# Patient Record
Sex: MALE | Race: WHITE | NOT HISPANIC OR LATINO | Employment: OTHER | URBAN - NONMETROPOLITAN AREA
[De-identification: names, ages, dates, MRNs, and addresses within clinical notes are randomized per-mention and may not be internally consistent; named-entity substitution may affect disease eponyms.]

---

## 2018-02-17 ENCOUNTER — TRANSCRIBE ORDERS (OUTPATIENT)
Dept: ADMINISTRATIVE | Facility: HOSPITAL | Age: 53
End: 2018-02-17

## 2018-02-17 ENCOUNTER — HOSPITAL ENCOUNTER (OUTPATIENT)
Dept: GENERAL RADIOLOGY | Facility: HOSPITAL | Age: 53
Discharge: HOME | End: 2018-02-17
Admitting: NURSE PRACTITIONER

## 2018-02-17 DIAGNOSIS — M54.5 LOW BACK PAIN, UNSPECIFIED BACK PAIN LATERALITY, UNSPECIFIED CHRONICITY, WITH SCIATICA PRESENCE UNSPECIFIED: Primary | ICD-10-CM

## 2018-02-17 PROCEDURE — 72110 X-RAY EXAM L-2 SPINE 4/>VWS: CPT

## 2018-03-01 ENCOUNTER — OFFICE VISIT (OUTPATIENT)
Dept: PRIMARY CARE CLINIC | Age: 53
End: 2018-03-01
Payer: COMMERCIAL

## 2018-03-01 VITALS
OXYGEN SATURATION: 97 % | BODY MASS INDEX: 24.64 KG/M2 | DIASTOLIC BLOOD PRESSURE: 70 MMHG | TEMPERATURE: 97.6 F | HEART RATE: 100 BPM | WEIGHT: 157 LBS | SYSTOLIC BLOOD PRESSURE: 100 MMHG | HEIGHT: 67 IN | RESPIRATION RATE: 16 BRPM

## 2018-03-01 DIAGNOSIS — G89.29 CHRONIC BILATERAL LOW BACK PAIN WITH BILATERAL SCIATICA: ICD-10-CM

## 2018-03-01 DIAGNOSIS — R79.89 LOW TESTOSTERONE LEVEL IN FEMALE: Primary | ICD-10-CM

## 2018-03-01 DIAGNOSIS — Z78.9 TRANSGENDER: ICD-10-CM

## 2018-03-01 DIAGNOSIS — M54.41 CHRONIC BILATERAL LOW BACK PAIN WITH BILATERAL SCIATICA: ICD-10-CM

## 2018-03-01 DIAGNOSIS — M54.42 CHRONIC BILATERAL LOW BACK PAIN WITH BILATERAL SCIATICA: ICD-10-CM

## 2018-03-01 PROCEDURE — 99204 OFFICE O/P NEW MOD 45 MIN: CPT | Performed by: FAMILY MEDICINE

## 2018-03-01 RX ORDER — KETOROLAC TROMETHAMINE 10 MG/1
10 TABLET, FILM COATED ORAL EVERY 6 HOURS PRN
COMMUNITY
Start: 2018-02-17 | End: 2018-10-25

## 2018-03-01 RX ORDER — KETOROLAC TROMETHAMINE 10 MG/1
10 TABLET, FILM COATED ORAL EVERY 6 HOURS PRN
Qty: 20 TABLET | Refills: 0 | Status: SHIPPED | OUTPATIENT
Start: 2018-03-01 | End: 2018-10-25

## 2018-03-01 RX ORDER — PREDNISONE 10 MG/1
TABLET ORAL
Qty: 48 EACH | Refills: 0 | Status: SHIPPED | OUTPATIENT
Start: 2018-03-01 | End: 2018-10-25

## 2018-03-01 RX ORDER — TIZANIDINE HYDROCHLORIDE 4 MG/1
4 CAPSULE, GELATIN COATED ORAL 3 TIMES DAILY
Qty: 60 CAPSULE | Refills: 0 | Status: SHIPPED | OUTPATIENT
Start: 2018-03-01 | End: 2018-10-25

## 2018-03-02 ASSESSMENT — ENCOUNTER SYMPTOMS
RHINORRHEA: 0
BACK PAIN: 1
NAUSEA: 0
COLOR CHANGE: 0
DIARRHEA: 0
ABDOMINAL PAIN: 0
COUGH: 0
CONSTIPATION: 0
VOMITING: 0

## 2018-03-02 NOTE — PROGRESS NOTES
Comments)     Sun posioning    Lyrica [Pregabalin] Other (See Comments)     Sleeping to much      Robaxin [Methocarbamol] Other (See Comments)     nightmares     Past Surgical History:   Procedure Laterality Date    KNEE ARTHROSCOPY      MASTECTOMY, BILATERAL      SHOULDER ARTHROSCOPY      THROAT SURGERY      TONSILLECTOMY AND ADENOIDECTOMY       History reviewed. No pertinent family history. Social History     Social History    Marital status:      Spouse name: N/A    Number of children: N/A    Years of education: N/A     Occupational History    Not on file. Social History Main Topics    Smoking status: Current Every Day Smoker     Packs/day: 1.00    Smokeless tobacco: Never Used    Alcohol use No    Drug use: No    Sexual activity: No      Comment: Pt is a transgender     Other Topics Concern    Not on file     Social History Narrative    No narrative on file       Review of Systems   Constitutional: Positive for fatigue. Negative for activity change and appetite change. HENT: Negative for congestion and rhinorrhea. Eyes: Negative for visual disturbance. Respiratory: Negative for cough. Cardiovascular: Negative for chest pain and palpitations. Gastrointestinal: Negative for abdominal pain, constipation, diarrhea, nausea and vomiting. Genitourinary: Negative for decreased urine volume and difficulty urinating. Musculoskeletal: Positive for arthralgias and back pain. Skin: Positive for rash (lesions on arms). Negative for color change. Allergic/Immunologic: Negative for immunocompromised state. Neurological: Negative for seizures and headaches. Hematological: Does not bruise/bleed easily. Psychiatric/Behavioral: Negative for agitation and sleep disturbance. OBJECTIVE:    Physical Exam   Constitutional: He is oriented to person, place, and time. He appears well-developed and well-nourished. No distress. HENT:   Head: Normocephalic and atraumatic.

## 2018-07-25 ENCOUNTER — TELEPHONE (OUTPATIENT)
Dept: PRIMARY CARE CLINIC | Age: 53
End: 2018-07-25

## 2018-07-25 NOTE — LETTER
[address]    Maria Guadalupe Arias, APRN    7/25/2018          [address]        Dear Mr. Hatfield:    In addition to helping you feel better when you are sick, we at [facility] ([facility]) are also interested in illness and injury prevention. To assist you in maintaining your good health, our records indicate that you are due for the following:      Health Maintenance Due   Topic    Lipid screen     Colon cancer screen colonoscopy           Please call to make an appointment with your primary care provider at your earliest convenience. If you have already received these services, please let us know so that your medical record can be updated. We look  forward to hearing from you soon.     Sincerely,         [address] Team

## 2018-10-25 ENCOUNTER — HOSPITAL ENCOUNTER (EMERGENCY)
Age: 53
Discharge: HOME | End: 2018-10-26
Payer: COMMERCIAL

## 2018-10-25 DIAGNOSIS — J40 BRONCHITIS: Primary | ICD-10-CM

## 2018-10-25 DIAGNOSIS — J44.1 COPD EXACERBATION (HCC): ICD-10-CM

## 2018-10-25 PROCEDURE — 93005 ELECTROCARDIOGRAM TRACING: CPT

## 2018-10-25 PROCEDURE — 99285 EMERGENCY DEPT VISIT HI MDM: CPT

## 2018-10-25 RX ORDER — IPRATROPIUM BROMIDE AND ALBUTEROL SULFATE 2.5; .5 MG/3ML; MG/3ML
1 SOLUTION RESPIRATORY (INHALATION) ONCE
Status: COMPLETED | OUTPATIENT
Start: 2018-10-25 | End: 2018-10-26

## 2018-10-25 ASSESSMENT — PAIN DESCRIPTION - LOCATION: LOCATION: CHEST

## 2018-10-25 ASSESSMENT — PAIN SCALES - GENERAL: PAINLEVEL_OUTOF10: 4

## 2018-10-25 ASSESSMENT — PAIN DESCRIPTION - DESCRIPTORS: DESCRIPTORS: PRESSURE

## 2018-10-26 ENCOUNTER — APPOINTMENT (OUTPATIENT)
Dept: GENERAL RADIOLOGY | Age: 53
End: 2018-10-26
Payer: COMMERCIAL

## 2018-10-26 VITALS
BODY MASS INDEX: 25.11 KG/M2 | OXYGEN SATURATION: 94 % | DIASTOLIC BLOOD PRESSURE: 69 MMHG | SYSTOLIC BLOOD PRESSURE: 106 MMHG | TEMPERATURE: 98 F | WEIGHT: 160 LBS | HEIGHT: 67 IN | HEART RATE: 94 BPM | RESPIRATION RATE: 18 BRPM

## 2018-10-26 LAB
ALBUMIN SERPL-MCNC: 4 G/DL (ref 3.5–5.2)
ALP BLD-CCNC: 91 U/L (ref 40–130)
ALT SERPL-CCNC: 15 U/L (ref 5–41)
ANION GAP SERPL CALCULATED.3IONS-SCNC: 11 MMOL/L (ref 7–19)
AST SERPL-CCNC: 14 U/L (ref 5–40)
BILIRUB SERPL-MCNC: <0.2 MG/DL (ref 0.2–1.2)
BUN BLDV-MCNC: 15 MG/DL (ref 6–20)
CALCIUM SERPL-MCNC: 9.1 MG/DL (ref 8.6–10)
CHLORIDE BLD-SCNC: 106 MMOL/L (ref 98–111)
CO2: 23 MMOL/L (ref 22–29)
CREAT SERPL-MCNC: 0.9 MG/DL (ref 0.5–1.2)
EKG P AXIS: 81 DEGREES
EKG P-R INTERVAL: 142 MS
EKG Q-T INTERVAL: 312 MS
EKG QRS DURATION: 84 MS
EKG QTC CALCULATION (BAZETT): 387 MS
EKG T AXIS: 69 DEGREES
GFR NON-AFRICAN AMERICAN: >60
GLUCOSE BLD-MCNC: 107 MG/DL (ref 74–109)
HCT VFR BLD CALC: 41.6 % (ref 42–52)
HEMOGLOBIN: 14 G/DL (ref 14–18)
MCH RBC QN AUTO: 30.9 PG (ref 27–31)
MCHC RBC AUTO-ENTMCNC: 33.7 G/DL (ref 33–37)
MCV RBC AUTO: 91.8 FL (ref 80–94)
PDW BLD-RTO: 11.9 % (ref 11.5–14.5)
PLATELET # BLD: 142 K/UL (ref 130–400)
PMV BLD AUTO: 10.6 FL (ref 9.4–12.4)
POTASSIUM SERPL-SCNC: 4.2 MMOL/L (ref 3.5–5)
RBC # BLD: 4.53 M/UL (ref 4.7–6.1)
SODIUM BLD-SCNC: 140 MMOL/L (ref 136–145)
TOTAL PROTEIN: 6.3 G/DL (ref 6.6–8.7)
WBC # BLD: 4.7 K/UL (ref 4.8–10.8)

## 2018-10-26 PROCEDURE — 71045 X-RAY EXAM CHEST 1 VIEW: CPT

## 2018-10-26 PROCEDURE — 96374 THER/PROPH/DIAG INJ IV PUSH: CPT

## 2018-10-26 PROCEDURE — 94664 DEMO&/EVAL PT USE INHALER: CPT

## 2018-10-26 PROCEDURE — 6360000002 HC RX W HCPCS: Performed by: NURSE PRACTITIONER

## 2018-10-26 PROCEDURE — 36415 COLL VENOUS BLD VENIPUNCTURE: CPT

## 2018-10-26 PROCEDURE — 85027 COMPLETE CBC AUTOMATED: CPT

## 2018-10-26 PROCEDURE — 80053 COMPREHEN METABOLIC PANEL: CPT

## 2018-10-26 PROCEDURE — 94640 AIRWAY INHALATION TREATMENT: CPT

## 2018-10-26 PROCEDURE — 99284 EMERGENCY DEPT VISIT MOD MDM: CPT | Performed by: NURSE PRACTITIONER

## 2018-10-26 PROCEDURE — 6370000000 HC RX 637 (ALT 250 FOR IP): Performed by: NURSE PRACTITIONER

## 2018-10-26 RX ORDER — METHYLPREDNISOLONE SODIUM SUCCINATE 125 MG/2ML
125 INJECTION, POWDER, LYOPHILIZED, FOR SOLUTION INTRAMUSCULAR; INTRAVENOUS ONCE
Status: COMPLETED | OUTPATIENT
Start: 2018-10-26 | End: 2018-10-26

## 2018-10-26 RX ORDER — PREDNISONE 20 MG/1
20 TABLET ORAL 2 TIMES DAILY
Qty: 10 TABLET | Refills: 0 | Status: SHIPPED | OUTPATIENT
Start: 2018-10-26 | End: 2018-10-31

## 2018-10-26 RX ORDER — ALBUTEROL SULFATE 90 UG/1
2 AEROSOL, METERED RESPIRATORY (INHALATION) EVERY 6 HOURS PRN
Qty: 1 INHALER | Refills: 3 | Status: SHIPPED | OUTPATIENT
Start: 2018-10-26

## 2018-10-26 RX ORDER — AZITHROMYCIN 250 MG/1
TABLET, FILM COATED ORAL
Qty: 1 PACKET | Refills: 0 | Status: SHIPPED | OUTPATIENT
Start: 2018-10-26 | End: 2018-10-30

## 2018-10-26 RX ADMIN — IPRATROPIUM BROMIDE AND ALBUTEROL SULFATE 1 AMPULE: .5; 3 SOLUTION RESPIRATORY (INHALATION) at 00:02

## 2018-10-26 RX ADMIN — METHYLPREDNISOLONE SODIUM SUCCINATE 125 MG: 125 INJECTION, POWDER, FOR SOLUTION INTRAMUSCULAR; INTRAVENOUS at 00:56

## 2018-10-26 ASSESSMENT — ENCOUNTER SYMPTOMS
COUGH: 1
SPUTUM PRODUCTION: 1
WHEEZING: 1
SHORTNESS OF BREATH: 1

## 2018-10-26 NOTE — ED PROVIDER NOTES
[carisoprodol]; Cortisporin [bacitra-neomycin-polymyxin-hc]; Flexeril [cyclobenzaprine]; Gabapentin; Tetracyclines & related; Lyrica [pregabalin]; and Robaxin [methocarbamol]    FAMILY HISTORY     History reviewed. No pertinent family history. SOCIAL HISTORY       Social History     Social History    Marital status:      Spouse name: N/A    Number of children: N/A    Years of education: N/A     Social History Main Topics    Smoking status: Current Every Day Smoker     Packs/day: 1.00    Smokeless tobacco: Never Used    Alcohol use No    Drug use: No    Sexual activity: No      Comment: Pt is a transgender     Other Topics Concern    None     Social History Narrative    None       SCREENINGS      @FLOW([id number])@      PHYSICAL EXAM    (up to 7 for level 4, 8 or more for level 5)     ED Triage Vitals [10/25/18 2349]   BP Temp Temp Source Pulse Resp SpO2 Height Weight   (!) 148/81 97.9 °F (36.6 °C) Oral 119 22 92 % 5' 7\" (1.702 m) 160 lb (72.6 kg)       Physical Exam   Constitutional: He appears well-developed and well-nourished. HENT:   Head: Normocephalic and atraumatic. Eyes: Right eye exhibits no discharge. Left eye exhibits no discharge. No scleral icterus. Neck: Normal range of motion. Neck supple. Cardiovascular: S1 normal, S2 normal and normal heart sounds. Tachycardia present. Pulmonary/Chest: Effort normal. No respiratory distress. He has wheezes. Neurological: He is alert. Psychiatric: He has a normal mood and affect. His behavior is normal.   Nursing note and vitals reviewed.       DIAGNOSTIC RESULTS     EKG: All EKG's are interpreted by the Emergency Department Physician who either signs or Co-signsthis chart in the absence of a cardiologist.        RADIOLOGY:   Non-plain filmimages such as CT, Ultrasound and MRI are read by the radiologist. Plain radiographic images are visualized and preliminarily interpreted by the emergency physician with the below

## 2020-07-20 ENCOUNTER — APPOINTMENT (OUTPATIENT)
Dept: CT IMAGING | Age: 55
End: 2020-07-20

## 2020-07-20 ENCOUNTER — APPOINTMENT (OUTPATIENT)
Dept: GENERAL RADIOLOGY | Age: 55
End: 2020-07-20

## 2020-07-20 ENCOUNTER — HOSPITAL ENCOUNTER (EMERGENCY)
Age: 55
Discharge: HOME | End: 2020-07-20
Attending: EMERGENCY MEDICINE

## 2020-07-20 VITALS
BODY MASS INDEX: 25.9 KG/M2 | OXYGEN SATURATION: 96 % | HEIGHT: 67 IN | WEIGHT: 165 LBS | TEMPERATURE: 97.8 F | DIASTOLIC BLOOD PRESSURE: 80 MMHG | SYSTOLIC BLOOD PRESSURE: 120 MMHG | HEART RATE: 79 BPM | RESPIRATION RATE: 18 BRPM

## 2020-07-20 PROCEDURE — 70450 CT HEAD/BRAIN W/O DYE: CPT

## 2020-07-20 PROCEDURE — 72125 CT NECK SPINE W/O DYE: CPT

## 2020-07-20 PROCEDURE — 72131 CT LUMBAR SPINE W/O DYE: CPT

## 2020-07-20 PROCEDURE — 73030 X-RAY EXAM OF SHOULDER: CPT

## 2020-07-20 PROCEDURE — 73080 X-RAY EXAM OF ELBOW: CPT

## 2020-07-20 PROCEDURE — 73110 X-RAY EXAM OF WRIST: CPT

## 2020-07-20 PROCEDURE — 73523 X-RAY EXAM HIPS BI 5/> VIEWS: CPT

## 2020-07-20 PROCEDURE — 99284 EMERGENCY DEPT VISIT MOD MDM: CPT

## 2020-07-20 PROCEDURE — 72128 CT CHEST SPINE W/O DYE: CPT

## 2020-07-20 ASSESSMENT — PAIN DESCRIPTION - LOCATION: LOCATION: BACK;WRIST;NECK

## 2020-07-20 ASSESSMENT — PAIN DESCRIPTION - PAIN TYPE: TYPE: ACUTE PAIN

## 2020-07-21 NOTE — ED PROVIDER NOTES
[facility] EMERGENCY DEPT  eMERGENCY dEPARTMENT eNCOUnter      Pt Name: Marilu Ellington  MRN: [id number]  [place] 1965  Date of evaluation: 7/20/2020  Provider: Fili Escoto MD    CHIEF COMPLAINT       Chief Complaint   Patient presents with    Fall     slip and fall @ work; c/o pain in neck, hips, lower back, bilat wrists; denies LOC         HISTORY OF PRESENT ILLNESS   (Location/Symptom, Timing/Onset,Context/Setting, Quality, Duration, Modifying Factors, Severity)  Note limiting factors. Marilu Ellington is a 63 y.o. male who presents to the emergency department for evaluation of injury sustained from a fall. Patient reports that he was coming out of work he slipped on the floor that was wet. States he fell backwards landing on his right shoulder and right wrist area. Also complains of pain in his low back area. He describes the pain is sharp in nature he is able to ambulate prior to ED arrival  He stated he did not strike his head or sustain loss of consciousness. The symptoms are described as moderate in severity. HPI    NursingNotes were reviewed. REVIEW OF SYSTEMS    (2-9 systems for level 4, 10 or more for level 5)     Review of Systems   Constitutional: Negative for chills and fever. Respiratory: Negative for shortness of breath. Cardiovascular: Negative for chest pain. Gastrointestinal: Negative for abdominal pain and vomiting. Musculoskeletal: Positive for back pain. Negative for gait problem. Neurological: Negative for dizziness and numbness. All other systems reviewed and are negative.            PAST MEDICALHISTORY     Past Medical History:   Diagnosis Date    Fibromyalgia     Hiatal hernia     Low testosterone     Tourette's          SURGICAL HISTORY       Past Surgical History:   Procedure Laterality Date    KNEE ARTHROSCOPY      MASTECTOMY, BILATERAL      SHOULDER ARTHROSCOPY      THROAT SURGERY      TONSILLECTOMY AND ADENOIDECTOMY           CURRENT MEDICATIONS Discharge Medication List as of 7/20/2020 10:46 PM      CONTINUE these medications which have NOT CHANGED    Details   albuterol sulfate HFA (VENTOLIN HFA) 108 (90 Base) MCG/ACT inhaler Inhale 2 puffs into the lungs every 6 hours as needed for Wheezing, Disp-1 Inhaler, R-3Print             ALLERGIES     Soma [carisoprodol]; Cortisporin [bacitra-neomycin-polymyxin-hc]; Flexeril [cyclobenzaprine]; Gabapentin; Tetracyclines & related; Lyrica [pregabalin]; and Robaxin [methocarbamol]    FAMILY HISTORY     No family history on file.        SOCIAL HISTORY       Social History     Socioeconomic History    Marital status:      Spouse name: Not on file    Number of children: Not on file    Years of education: Not on file    Highest education level: Not on file   Occupational History    Not on file   Social Needs    Financial resource strain: Not on file    Food insecurity     Worry: Not on file     Inability: Not on file    Transportation needs     Medical: Not on file     Non-medical: Not on file   Tobacco Use    Smoking status: Current Every Day Smoker     Packs/day: 1.00    Smokeless tobacco: Never Used   Substance and Sexual Activity    Alcohol use: No    Drug use: No    Sexual activity: Never     Comment: Pt is a transgender   Lifestyle    Physical activity     Days per week: Not on file     Minutes per session: Not on file    Stress: Not on file   Relationships    Social connections     Talks on phone: Not on file     Gets together: Not on file     Attends Jain service: Not on file     Active member of club or organization: Not on file     Attends meetings of clubs or organizations: Not on file     Relationship status: Not on file    Intimate partner violence     Fear of current or ex partner: Not on file     Emotionally abused: Not on file     Physically abused: Not on file     Forced sexual activity: Not on file   Other Topics Concern    Not on file   Social History Narrative    Not on file       SCREENINGS    Saurav Coma Scale  Eye Opening: Spontaneous  Best Verbal Response: Oriented  Best Motor Response: Obeys commands  Saurav Coma Scale Score: 15        PHYSICAL EXAM    (up to 7 for level 4, 8 or more for level 5)     ED Triage Vitals [07/20/20 1959]   BP Temp Temp Source Pulse Resp SpO2 Height Weight   120/80 97.8 °F (36.6 °C) Oral 79 18 96 % 5' 7\" (1.702 m) 165 lb (74.8 kg)       Physical Exam  Vitals signs and nursing note reviewed. HENT:      Head: Atraumatic. Mouth/Throat:      Mouth: Mucous membranes are not dry. Pharynx: No posterior oropharyngeal erythema. Eyes:      General: No scleral icterus. Pupils: Pupils are equal, round, and reactive to light. Neck:      Trachea: No tracheal deviation. Cardiovascular:      Rate and Rhythm: Normal rate and regular rhythm. Heart sounds: Normal heart sounds. No murmur. Pulmonary:      Effort: Pulmonary effort is normal. No respiratory distress. Breath sounds: Normal breath sounds. No stridor. Abdominal:      General: There is no distension. Palpations: Abdomen is soft. Tenderness: There is no abdominal tenderness. There is no guarding. Musculoskeletal:      Right shoulder: He exhibits tenderness. He exhibits normal range of motion. Right wrist: He exhibits tenderness. Right hip: He exhibits normal range of motion and no tenderness. Left hip: He exhibits normal range of motion and no tenderness. Lumbar back: He exhibits tenderness. Skin:     Coloration: Skin is not pale. Findings: No rash. Neurological:      Mental Status: He is alert and oriented to person, place, and time. Psychiatric:         Behavior: Behavior is cooperative.          DIAGNOSTIC RESULTS       RADIOLOGY:  Non-plain film images such as CT, Ultrasound and MRI are read by the radiologist. Plain radiographic images are visualized and preliminarily interpreted bythe emergency physician with the below (1.702 m)       MDM    Reassessment    Patient's radiographs are unremarkable. He is able to ambulate any difficulty. PROCEDURES:  Unless otherwise noted below, none     Procedures    FINAL IMPRESSION      1. Closed head injury, initial encounter    2. Fall, initial encounter    3. Contusion of multiple sites of right shoulder and upper arm, initial encounter          DISPOSITION/PLAN   DISPOSITION Decision To Discharge 07/20/2020 10:23:57 PM      PATIENT REFERRED TO:  [address]   [place] [id number]  [phone]  Schedule an appointment as soon as possible for a visit         DISCHARGE MEDICATIONS:  Discharge Medication List as of 7/20/2020 10:46 PM             (Please note that portions of this note were completed with a voice recognition program.  Efforts were made to edit thedictations but occasionally words are mis-transcribed.)    Tosha Trujillo MD (electronically signed)  Attending Emergency Physician         Tosha Trujillo MD  08/03/20 [id number]

## 2020-07-21 NOTE — ED NOTES
Bed: 22  Expected date:   Expected time:   Means of arrival:   Comments:  [facility] back pain     Wilmar Chao RN  07/20/20 [id number]

## 2020-08-03 ASSESSMENT — ENCOUNTER SYMPTOMS
SHORTNESS OF BREATH: 0
ABDOMINAL PAIN: 0
VOMITING: 0
BACK PAIN: 1

## 2022-02-08 ENCOUNTER — OFFICE VISIT (OUTPATIENT)
Dept: GASTROENTEROLOGY | Facility: CLINIC | Age: 57
End: 2022-02-08

## 2022-02-08 VITALS
BODY MASS INDEX: 32.65 KG/M2 | TEMPERATURE: 97.3 F | OXYGEN SATURATION: 97 % | DIASTOLIC BLOOD PRESSURE: 82 MMHG | WEIGHT: 208 LBS | HEART RATE: 114 BPM | SYSTOLIC BLOOD PRESSURE: 132 MMHG | HEIGHT: 67 IN

## 2022-02-08 DIAGNOSIS — R12 HEARTBURN: Primary | ICD-10-CM

## 2022-02-08 DIAGNOSIS — Z12.11 COLON CANCER SCREENING: ICD-10-CM

## 2022-02-08 DIAGNOSIS — K21.9 GASTROESOPHAGEAL REFLUX DISEASE, UNSPECIFIED WHETHER ESOPHAGITIS PRESENT: ICD-10-CM

## 2022-02-08 DIAGNOSIS — R13.19 OTHER DYSPHAGIA: ICD-10-CM

## 2022-02-08 DIAGNOSIS — Z01.818 PREOPERATIVE TESTING: Primary | ICD-10-CM

## 2022-02-08 PROCEDURE — 99204 OFFICE O/P NEW MOD 45 MIN: CPT | Performed by: NURSE PRACTITIONER

## 2022-02-08 RX ORDER — TESTOSTERONE CYPIONATE 200 MG/ML
1 INJECTION, SOLUTION INTRAMUSCULAR WEEKLY
COMMUNITY
Start: 2022-01-21

## 2022-02-08 RX ORDER — PEG-3350, SODIUM SULFATE, SODIUM CHLORIDE, POTASSIUM CHLORIDE, SODIUM ASCORBATE AND ASCORBIC ACID 7.5-2.691G
1000 KIT ORAL EVERY 12 HOURS
Qty: 1 EACH | Refills: 0 | Status: SHIPPED | OUTPATIENT
Start: 2022-02-08

## 2022-02-08 RX ORDER — PANTOPRAZOLE SODIUM 40 MG/1
1 TABLET, DELAYED RELEASE ORAL 2 TIMES DAILY
COMMUNITY
Start: 2022-01-14

## 2022-02-08 RX ORDER — MECLIZINE HYDROCHLORIDE 25 MG/1
1 TABLET ORAL AS NEEDED
COMMUNITY
Start: 2021-12-17

## 2022-02-08 RX ORDER — CETIRIZINE HYDROCHLORIDE 10 MG/1
10 TABLET ORAL AS NEEDED
COMMUNITY
Start: 2021-12-03

## 2022-02-08 RX ORDER — FAMOTIDINE 40 MG/1
1 TABLET, FILM COATED ORAL 2 TIMES DAILY
COMMUNITY
Start: 2022-02-03

## 2022-02-08 RX ORDER — ALBUTEROL SULFATE 2.5 MG/3ML
SOLUTION RESPIRATORY (INHALATION) AS NEEDED
COMMUNITY
Start: 2021-12-03

## 2022-02-08 RX ORDER — NAPROXEN 500 MG/1
1 TABLET ORAL AS NEEDED
COMMUNITY
Start: 2021-12-03

## 2022-02-08 NOTE — PROGRESS NOTES
"Chief Complaint:   Chief Complaint   Patient presents with   • Heartburn     Pt c/o severe reflux lately at night-states he will wake up in the middle of the night \"coughing up acid\"   • Difficulty Swallowing     Pt also states at times he has trouble swallowing-occasionally when he swallows it feels like \"there is a little air bubble\" that keeps anything from going down; Pt's last endo and colon in 2013 in [place]-was told he had a HH         Patient ID: Sean Garcia is a 56 y.o. male     History of Present Illness: This is a very pleasant 56-year-old male who is here today with complaints of heartburn and difficulty swallowing.    The patient states that she has been having \"severe reflux lately at night.\"  Patient states she will wake up in the morning \"coughing up acid.\"  She states she has also begun to have difficulty swallowing.  She states this is intermittent.  She states when it occurs \"it feels there is a bubble like air in my esophagus and I cannot get anything to go down.\"  The patient states that his symptoms have been going on for 3 to 4 months and was using over-the-counter Nexium until he followed up with PCP who started him on Protonix 40 mg daily.  He states he was also told to start Pepcid but has not picked any up yet from the pharmacy.  Patient states she had an EGD performed in [place] and was told she had a hiatal hernia.  In addition the patient is due for a colonoscopy.  The patient states a colonoscopy was performed approximately 2012 found to be normal.  There is no known family history of colon cancer or colon polyps.The patient denies any nausea, vomiting, epigastric pain,  or hematemesis.  The patient denies any fever or chills.  Denies any melena or hematochezia.  Denies any unintentional weight loss or loss of appetite.        Past Medical History:   Diagnosis Date   • ADD (attention deficit disorder) without hyperactivity    • Anxiety    • Depression    • Facet syndrome    • " GERD (gastroesophageal reflux disease)    • Tourette's syndrome        Past Surgical History:   Procedure Laterality Date   • KNEE ARTHROSCOPY Left    • MASTECTOMY RADICAL Bilateral    • NASAL SINUS SURGERY      Burned excess tissue from nasal passages   • SHOULDER SURGERY Right    • TONSILLECTOMY AND ADENOIDECTOMY           Current Outpatient Medications:   •  Advair Diskus 250-50 MCG/DOSE DISKUS, Inhale 1 puff 2 (Two) Times a Day., Disp: , Rfl:   •  albuterol (PROVENTIL) (2.5 MG/3ML) 0.083% nebulizer solution, Take  by nebulization As Needed., Disp: , Rfl:   •  cetirizine (zyrTEC) 10 MG tablet, Take 10 mg by mouth As Needed., Disp: , Rfl:   •  famotidine (PEPCID) 40 MG tablet, Take 1 tablet by mouth Every Night., Disp: , Rfl:   •  meclizine (ANTIVERT) 25 MG tablet, Take 1 tablet by mouth As Needed., Disp: , Rfl:   •  naproxen (NAPROSYN) 500 MG tablet, Take 1 tablet by mouth As Needed., Disp: , Rfl:   •  pantoprazole (PROTONIX) 40 MG EC tablet, Take 1 tablet by mouth Daily., Disp: , Rfl:   •  ProAir  (90 Base) MCG/ACT inhaler, Inhale 2 puffs As Needed., Disp: , Rfl:   •  Testosterone Cypionate (DEPOTESTOTERONE CYPIONATE) 200 MG/ML injection, Inject 1 mL into the appropriate muscle as directed by prescriber 1 (One) Time Per Week., Disp: , Rfl:   •  PEG-KCl-NaCl-NaSulf-Na Asc-C (MoviPrep) 100 g reconstituted solution powder, Take 1,000 mL by mouth Every 12 (Twelve) Hours., Disp: 1 each, Rfl: 0    Allergies   Allergen Reactions   • Cyclobenzaprine Hives   • Codeine Other (See Comments)     Unknown    • Cortisporin [Bacitra-Neomycin-Polymyxin-Hc] Other (See Comments)     Unknown    • Gabapentin Other (See Comments)     Unknown    • Lithium Other (See Comments)     Unknown    • Lyrica [Pregabalin] Other (See Comments)     Unknown    • Paxil [Paroxetine] Other (See Comments)     Unknown    • Soma [Carisoprodol] Other (See Comments)     Unknown    • Tetracyclines & Related Other (See Comments)     Unknown    •  "Methocarbamol Other (See Comments)     nightmares       Social History     Socioeconomic History   • Marital status: Single   Tobacco Use   • Smoking status: Former Smoker   • Smokeless tobacco: Never Used   Vaping Use   • Vaping Use: Former   Substance and Sexual Activity   • Alcohol use: Not Currently   • Drug use: Defer   • Sexual activity: Defer       Family History   Problem Relation Age of Onset   • Colon cancer Neg Hx    • Colon polyps Neg Hx    • Esophageal cancer Neg Hx    • Liver cancer Neg Hx    • Liver disease Neg Hx    • Rectal cancer Neg Hx    • Stomach cancer Neg Hx        Vitals:    02/08/22 1004   BP: 132/82   BP Location: Left arm   Patient Position: Sitting   Cuff Size: Adult   Pulse: 114   Temp: 97.3 °F (36.3 °C)   TempSrc: Infrared   SpO2: 97%   Weight: 94.3 kg (208 lb)   Height: 170.2 cm (67\")       Review of Systems:    General:    Present -feeling well   Skin:    Not Present-Rash   HEENT:     Not Present-Acute visual changes or Acute hearing changes   Neck :    Not Present- swollen glands   Genitourinary:      Not Present- burning, frequency, urgency hematuria, dysuria,   Cardiovascular:   Not Present-chest pain, palpitations, or pressure   Respiratory:   Not Present- shortness of breath or cough   Gastrointestinal:  Musculoskeletal:  Neurological:  Psychiatric:   Present as mentioned in the HP    Not Present. Recent gait disturbances.    Not Present-Seizures and weakness in extremities.    Not Present- Anxiety or Depression.       Physical Exam:    General Appearance:    Alert, cooperative, in no acute distress   Psych:    Mood appropriate    Eyes:          conjunctivae and sclerae normal, no   icterus, no pallor   ENMT:    Ears appear intact with no abnormalities noted oral mucosa moist   Neck:   No adenopathy, supple, trachea midline, no thyromegaly, no   carotid bruit, no JVD    Cardiovascular:    Regular rhythm and normal rate, normal S1 and S2, no            murmur, no gallop, no rub, " no click   Gastrointestinal:     Inspection normal.  Normal bowel sounds, no masses, no organomegaly, soft round non-tender, non-distended, no guarding, no rebound or tenderness. No hepatosplenomegaly.   Skin:   No bleeding, bruising or rash   Neurologic:   nonfocal       Assessment and Plan:  Assessment/Plan   Diagnoses and all orders for this visit:    1. Heartburn (Primary)  -     Case Request; Standing  -     Case Request    2. Gastroesophageal reflux disease, unspecified whether esophagitis present  -     Case Request; Standing  -     Case Request    3. Other dysphagia  -     Case Request; Standing  -     Case Request    4. Colon cancer screening  -     Case Request; Standing  -     Case Request    Other orders  -     Follow Anesthesia Guidelines / Protocol; Future  -     Obtain Informed Consent; Future  -     PEG-KCl-NaCl-NaSulf-Na Asc-C (MoviPrep) 100 g reconstituted solution powder; Take 1,000 mL by mouth Every 12 (Twelve) Hours.  Dispense: 1 each; Refill: 0      Schedule patient for both EGD and colonoscopy.  Continue on Protonix may take twice daily at this time until patient is able to have scope performed.  Patient states he does not have enough money to purchase over-the-counter Pepcid.  The patient also tells us he is unsure as to whether or not he can get someone to drive him to the procedure.     There are no Patient Instructions on file for this visit.    Next follow-up appointment      The risks, benefits, and alternatives of endoscopy were reviewed with the patient today.  Risks including perforation, with or without dilation, possibly requiring surgery.  Additional risks include risk of bleeding.  There is also the risk of a drug reaction or problems with anesthesia.  This will be discussed with the further by the anesthesia team on the day of the procedure. The benefits include the diagnosis and management of disease of the upper digestive tract.  Alternatives to endoscopy include upper GI  series, laboratory testing, radiographic evaluation, or no intervention.  The patient verbalizes understanding and agrees.      EMR Dragon/Transcription disclaimer:  Much of this encounter note is an electronic transcription/translation of spoken language to printed text. The electronic translation of spoken language may permit erroneous, or at times, nonsensical words or phrases to be inadvertently transcribed; although I have reviewed the note for such errors, some may still exist.

## 2022-02-10 ENCOUNTER — TRANSCRIBE ORDERS (OUTPATIENT)
Dept: ADMINISTRATIVE | Facility: HOSPITAL | Age: 57
End: 2022-02-10

## 2022-02-10 ENCOUNTER — HOSPITAL ENCOUNTER (OUTPATIENT)
Dept: GENERAL RADIOLOGY | Facility: HOSPITAL | Age: 57
Discharge: HOME | End: 2022-02-10
Admitting: PAIN MEDICINE

## 2022-02-10 DIAGNOSIS — M46.1 SACROILIITIS, NOT ELSEWHERE CLASSIFIED: Primary | ICD-10-CM

## 2022-02-10 DIAGNOSIS — M47.816 LUMBAR SPONDYLOSIS: ICD-10-CM

## 2022-02-10 PROCEDURE — 72170 X-RAY EXAM OF PELVIS: CPT

## 2022-03-04 ENCOUNTER — APPOINTMENT (OUTPATIENT)
Dept: LAB | Facility: HOSPITAL | Age: 57
End: 2022-03-04

## 2022-04-01 ENCOUNTER — TELEPHONE (OUTPATIENT)
Dept: GASTROENTEROLOGY | Facility: CLINIC | Age: 57
End: 2022-04-01

## 2022-04-01 NOTE — TELEPHONE ENCOUNTER
Pt called me this morning cancelling his endo/colon for Mon. He tells me his car is broken down and he can't afford to fix it at this time. He also can't find anyone else to drive him. He wants to just call back when he is ready to r/s as he has cancelled several appts for the same reason. I am going to place him in recall for 6 months as a backup but he will call Eva to r/s as soon as he knows he has a ride. I will also let scheduling know he cancelled.

## 2022-04-18 ENCOUNTER — TELEPHONE (OUTPATIENT)
Dept: NEUROSURGERY | Age: 57
End: 2022-04-18

## 2022-04-18 NOTE — TELEPHONE ENCOUNTER
This is in our pending closures wq. Noted that we could not leave a message for this patient when originally working the referral. We found an additional number and reached out to the patient to see if he still needed referral. He stated that he had called the [facility] Neurosurgery office many times and left Vmail but no one ever called him back. He would like someone to reach out to him to move forward with his consultation. Has occular migraines off and on. Have not been a problem recently.

## 2022-07-21 ENCOUNTER — TRANSCRIBE ORDERS (OUTPATIENT)
Dept: PHYSICAL THERAPY | Facility: CLINIC | Age: 57
End: 2022-07-21

## 2022-07-21 DIAGNOSIS — R26.81 UNSTEADINESS ON FEET: Primary | ICD-10-CM

## 2022-07-26 ENCOUNTER — TELEPHONE (OUTPATIENT)
Dept: NEUROSURGERY | Age: 57
End: 2022-07-26

## 2022-08-01 ENCOUNTER — OFFICE VISIT (OUTPATIENT)
Dept: NEUROLOGY | Age: 57
End: 2022-08-01
Payer: MEDICAID

## 2022-08-01 VITALS
HEART RATE: 128 BPM | WEIGHT: 190 LBS | HEIGHT: 67 IN | BODY MASS INDEX: 29.82 KG/M2 | DIASTOLIC BLOOD PRESSURE: 88 MMHG | SYSTOLIC BLOOD PRESSURE: 138 MMHG | RESPIRATION RATE: 20 BRPM

## 2022-08-01 DIAGNOSIS — H53.9 VISION CHANGES: Primary | ICD-10-CM

## 2022-08-01 DIAGNOSIS — Z82.49 FAMILY HISTORY OF ANEURYSM: ICD-10-CM

## 2022-08-01 DIAGNOSIS — H53.9 VISION CHANGES: ICD-10-CM

## 2022-08-01 DIAGNOSIS — R40.4 TRANSIENT ALTERATION OF AWARENESS: ICD-10-CM

## 2022-08-01 DIAGNOSIS — R42 DIZZINESS: ICD-10-CM

## 2022-08-01 LAB
C-REACTIVE PROTEIN: 0.41 MG/DL (ref 0–0.5)
SEDIMENTATION RATE, ERYTHROCYTE: 2 MM/HR (ref 0–15)

## 2022-08-01 PROCEDURE — 99204 OFFICE O/P NEW MOD 45 MIN: CPT | Performed by: NURSE PRACTITIONER

## 2022-08-01 RX ORDER — MECLIZINE HYDROCHLORIDE 25 MG/1
1 TABLET ORAL PRN
COMMUNITY
Start: 2021-12-17

## 2022-08-01 RX ORDER — FAMOTIDINE 40 MG/1
1 TABLET, FILM COATED ORAL NIGHTLY
COMMUNITY
Start: 2022-02-03

## 2022-08-01 RX ORDER — PANTOPRAZOLE SODIUM 40 MG/1
1 TABLET, DELAYED RELEASE ORAL DAILY
COMMUNITY
Start: 2022-01-14

## 2022-08-01 RX ORDER — CETIRIZINE HYDROCHLORIDE 10 MG/1
10 TABLET ORAL PRN
COMMUNITY
Start: 2021-12-03

## 2022-08-01 RX ORDER — TESTOSTERONE CYPIONATE 200 MG/ML
1 INJECTION INTRAMUSCULAR WEEKLY
COMMUNITY
Start: 2022-01-21

## 2022-08-01 RX ORDER — FLUTICASONE PROPIONATE AND SALMETEROL 250; 50 UG/1; UG/1
1 POWDER RESPIRATORY (INHALATION) 2 TIMES DAILY
COMMUNITY
Start: 2021-12-03

## 2022-08-01 RX ORDER — TAMOXIFEN CITRATE 10 MG/1
TABLET ORAL
COMMUNITY
Start: 2022-07-20

## 2022-08-01 NOTE — PROGRESS NOTES
[facility]:    Patient: Jose Syed   :  1965  Age:  61 y.o. MRN:  [id number]  Today:  22    Provider: ELIA Peres    Chief Complaint:  Chief Complaint   Patient presents with    Follow-up     Occular migraine         HISTORY OF PRESENT ILLNESS:   Jose Syed is a 61y.o. year old male with a history of anxiety, bronchitis, GERD, DDD, lumbar spondylosis, mood disorder, tourette's, ataxia, and transexualism who was referred for  possible optic migraines. Patient relates that when optic migraine events occur he has flashing lights in his peripheral fields and his visual fields become narrowed. States it feels like he is looking through a tube. Light starts white then into rainbow colors, only in periphery. There is no associated pain. He states when these occur they happen in clusters over the course of a month and then he can go a few months without having any. In March he had about 3-4 of these events. Events last anywhere from five minutes to five hours. He has vertigo associated with these. Associated nausea. There is maybe some photophobia. Denies phonophobia, states he is normally sound sensitive to certain frequencies. He has found no triggers for these. Nothing seems to abort them. He states he typically lays down and tries to sleep when they occur and this does seem to help. Also has intermittent unilateral tinnitus that rotates ears. Is seeing ENT for this in October. Not correlated with optic events. In 2020 patient had mild TBI from fall. Has had short term memory problems since. Has history of aneurysm in mother. He also states years ago he had event where he had severe occipital pain and fell into floor unconscious. He states a doctor told him he had a small aneurysm rupture. Never had imaging of head or further evaluation of this. He also relates he \"loses chunks of time\" and sometimes spaces out when people talk to him.  He does not ever recall doing this. No known history of seizures. PAST MEDICAL HISTORY:    Medical History:      Diagnosis Date    Fibromyalgia     Hiatal hernia     Low testosterone     Tourette's        Surgical History:      Procedure Laterality Date    KNEE ARTHROSCOPY      MASTECTOMY, BILATERAL      SHOULDER ARTHROSCOPY      THROAT SURGERY      TONSILLECTOMY AND ADENOIDECTOMY         Current Medications:  Current Outpatient Medications   Medication Sig Dispense Refill    fluticasone-salmeterol (ADVAIR) 250-50 MCG/ACT AEPB diskus inhaler Inhale 1 puff into the lungs 2 times daily      cetirizine (ZYRTEC) 10 MG tablet Take 10 mg by mouth as needed      tamoxifen (NOLVADEX) 10 MG tablet       testosterone cypionate (DEPOTESTOTERONE CYPIONATE) 200 MG/ML injection Inject 1 mL into the muscle once a week. pantoprazole (PROTONIX) 40 MG tablet Take 1 tablet by mouth in the morning. famotidine (PEPCID) 40 MG tablet Take 1 tablet by mouth nightly      meclizine (ANTIVERT) 25 MG tablet Take 1 tablet by mouth as needed      albuterol sulfate HFA (VENTOLIN HFA) 108 (90 Base) MCG/ACT inhaler Inhale 2 puffs into the lungs every 6 hours as needed for Wheezing 1 Inhaler 3     No current facility-administered medications for this visit.        Allergies:  Soma [carisoprodol], Cortisporin [bacitra-neomycin-polymyxin-hc], Flexeril [cyclobenzaprine], Gabapentin, Tetracyclines & related, Lyrica [pregabalin], and Robaxin [methocarbamol]    SOCIAL HISTORY:   Social History     Socioeconomic History    Marital status:      Spouse name: Not on file    Number of children: Not on file    Years of education: Not on file    Highest education level: Not on file   Occupational History    Not on file   Tobacco Use    Smoking status: Every Day     Packs/day: 1.00     Types: Cigarettes    Smokeless tobacco: Never   Vaping Use    Vaping Use: Never used   Substance and Sexual Activity    Alcohol use: No    Drug use: No    Sexual activity: Never muscles  Musculoskeletal - No significant wasting of muscles noted, no bony deformities  Extremities - No clubbing, cyanosis or edema  Skin - Warm, dry, and intact. No rash, erythema, or pallor  Psychiatric - Mood, affect, and behavior appear normal          NEUROLOGIC EXAMINATION:    Mental status   [x]Awake, alert, oriented   [x]Affect attention and concentration appear appropriate  [x]Recent and remote memory appears unremarkable  []Speech normal without dysarthria or aphasia, comprehension and repetition intact. COMMENTS: vocal tic's   Cranial Nerves [x]No VF deficit to confrontation  [x]PERRLA, EOMI, no nystagmus, conjugate eye movements, no ptosis  [x]Face symmetric  [x]Facial sensation intact  [x]Tongue midline no atrophy or fasciculations present  [x]Palate midline, hearing to finger rub normal bilaterally  [x]Shoulder shrug and SCM testing normal bilaterally  COMMENTS:   Motor   [x]5/5 strength x 4 extremities  [x]Normal bulk and tone  [x]No tremor present  [x]No rigidity or bradykinesia noted  COMMENTS:   Sensory  [x]Sensation intact to light touch, vibration, and proprioception BLE  [x]Sensation intact to light touch, vibration, and proprioception BUE  COMMENTS:   Coordination [x]FTN normal bilaterally   [x]KEVIN normal bilaterally.    COMMENTS:   Reflexes  [x]Symmetric and non-pathological  [x]Toes down going bilaterally  [x]No clonus present  COMMENTS:   Gait                  []Normal steady gait    [x]Ataxic    []Spastic     []Magnetic     []Shuffling  COMMENTS:       ADDITIONAL REVIEW:  No results found for: KNPUOCIC10  Lab Results   Component Value Date    WBC 4.7 (L) 10/26/2018    HGB 14.0 10/26/2018    HCT 41.6 (L) 10/26/2018    MCV 91.8 10/26/2018     10/26/2018     Lab Results   Component Value Date     10/26/2018    K 4.2 10/26/2018     10/26/2018    CO2 23 10/26/2018    BUN 15 10/26/2018    CREATININE 0.9 10/26/2018    GLUCOSE 107 10/26/2018    CALCIUM 9.1 10/26/2018    PROT 6.3 (L) 10/26/2018    LABALBU 4.0 10/26/2018    BILITOT <0.2 10/26/2018    ALKPHOS 91 10/26/2018    AST 14 10/26/2018    ALT 15 10/26/2018    LABGLOM >60 10/26/2018       IMPRESSION:  Matilda Blakely is a 61 y.o. male here today for evaluation of optic migraines. Patient relates that when optic migraine events occur he has flashing lights in his peripheral fields and his visual fields become narrowed as if looking through a tube. There is no associated pain. He states when these occur they happen in clusters over the course of a month and then he can go a few months without having any. No clear triggers. Exam today non-focal. While talking about pharmacological management patient confesses he does not take his medicines compliantly. He relates he forgets when he takes them, sometimes misses doses, sometimes takes an extra pill by accident. Will hold off on medications for now since patient has not had these optic migraine events since March. He also relates some episodes where he loses time in the day. States this has happened intermittently for years. Also he spaces out when people are talking to him. No GTC, incontinence, seizure history. Seizures felt less likely but will order EEG to further evaluate. Also history of aneurysm in mother and possible aneurysm in patient. MRI and MRA ordered to further evaluate symptoms. Inflammatory labs ordered today as well. ICD-10-CM    1. Vision changes  H53.9 MRI BRAIN W WO CONTRAST     MRA HEAD WO CONTRAST     C-Reactive Protein     Sedimentation Rate      2. Dizziness  R42 MRI BRAIN W WO CONTRAST     MRA HEAD WO CONTRAST      3. Family history of aneurysm  Z82.49 MRA HEAD WO CONTRAST      4. Transient alteration of awareness  R40.4 EEG awake and asleep            PLAN:  MRI brain W WO  MRV Brain  Labs today as listed  EEG  Will hold off on medicine today. Can consider if these re-occur in a cluster. Epilepsy precautions and seizure first aid discussed.   No heights, swimming, tub baths, open flames, or heavy machinery. Driving per [employer] 7. Patient advised of the pathophysiology, etiology, and diagnosis of migraines, along with treatment options, and treatment side effects. 8. Return in about 3 months (around 11/1/2022) for Follow up, sooner with worsening symptoms.      Tad Del Rio, APRN - CNP

## 2022-08-01 NOTE — PROGRESS NOTES
REVIEW OF SYSTEMS    Constitutional: []Fever []Sweats []Chills [] Recent Injury   [x] Denies all unless marked  HENT:[]Headache  [x] Head Injury  [] Sore Throat  [] Ear Pain  [] Dizziness [] Hearing Loss   [] Denies all unless marked  Musculoskeletal: [] Arthralgia  [] Myalgias [] Muscle cramps  [] Muscle twitches   [x] Denies all unless marked   Spine:  [] Neck pain  [] Back pain  [] Sciaticia  [x] Denies all unless marked  Neurological:[] Visual Disturbance [] Double Vision [] Slurred Speech [] Trouble swallowing  [] Vertigo [] Tingling [] Numbness [] Weakness [] Loss of Balance   [] Loss of Consciousness [] Memory Loss [] Seizures  [x] Denies all unless marked  Psychiatric/Behavioral:[] Depression [] Anxiety  [x] Denies all unless marked  Sleep: []  Insomnia [] Sleep Disturbance [] Snoring [] Restless Legs [] Daytime Sleepiness [] Sleep Apnea  [x] Denies all unless marked

## 2022-08-15 ENCOUNTER — TREATMENT (OUTPATIENT)
Dept: PHYSICAL THERAPY | Facility: CLINIC | Age: 57
End: 2022-08-15

## 2022-08-15 ENCOUNTER — TELEPHONE (OUTPATIENT)
Dept: GASTROENTEROLOGY | Facility: CLINIC | Age: 57
End: 2022-08-15

## 2022-08-15 DIAGNOSIS — R26.89 IMPAIRMENT OF BALANCE: Primary | ICD-10-CM

## 2022-08-15 PROCEDURE — 97163 PT EVAL HIGH COMPLEX 45 MIN: CPT | Performed by: PHYSICAL THERAPIST

## 2022-08-15 NOTE — TELEPHONE ENCOUNTER
Received referral from Dr. Leonard's office. Pt needs to r/s endo/colon. Pt is still having trouble finding someone to bring him. He said he was going to use [employer], but when I informed him someone would still need to be with him to sign off on his care, he said he wasn't sure if he could find someone. He said he would ask his neighbor to ride [employer] with him. He will call us back once he speaks with her.

## 2022-08-15 NOTE — PROGRESS NOTES
"                                                        Physical Therapy Initial Evaluation and Plan of Care and Discharge    Patient: Sean Garcia               : 1965  Visit Date: 8/15/2022  Referring practitioner: Severo Leonard DO  Date of Initial Visit: 8/15/2022  Patient seen for 1 sessions    Visit Diagnoses:    ICD-10-CM ICD-9-CM   1. Impairment of balance  R26.89 781.2     Past Medical History:   Diagnosis Date   • ADD (attention deficit disorder) without hyperactivity    • Anxiety    • Depression    • Facet syndrome    • GERD (gastroesophageal reflux disease)    • Tourette's syndrome      Past Surgical History:   Procedure Laterality Date   • KNEE ARTHROSCOPY Left    • MASTECTOMY RADICAL Bilateral    • NASAL SINUS SURGERY      Burned excess tissue from nasal passages   • SHOULDER SURGERY Right    • TONSILLECTOMY AND ADENOIDECTOMY           SUBJECTIVE     Subjective Evaluation    History of Present Illness    Subjective comment: Pt reports that he fell and got hurt in 2020 when he slipped and landed on a curb. Since that time he has had difficulty with standing, walking and his overall mobility. He reports that he has eight herniated disc. He falls \"randomly for no aparent reason.\" He estimates that he has had 4-5 significant falls within the past year and several stumbles where he catches himself against the wall.   Patient Occupation: Disability Pain  Current pain ratin  At best pain ratin  At worst pain rating: 10  Location: Back  Quality: sharp, pressure and knife-like (sledgehammer)  Relieving factors: change in position  Aggravating factors: ambulation, standing and squatting    Social Support  Lives in: one-story house ([address])  Lives with: alone    Hand dominance: right    Patient Goals  Patient goals for therapy: decreased pain, improved balance and increased motion             OBJECTIVE     Objective          Neurological Testing     Sensation     Lumbar   Left "   Intact: light touch    Right   Intact: light touch  Diminished: light touch    Reflexes   Left   Patellar (L4): normal (2+)  Achilles (S1): normal (2+)    Right   Patellar (L4): normal (2+)  Achilles (S1): normal (2+)    Strength/Myotome Testing     Left Hip   Planes of Motion   Flexion: 4-    Right Hip   Planes of Motion   Flexion: 5    Left Knee   Flexion: 5  Extension: 4-    Right Knee   Flexion: 5  Extension: 5    Left Ankle/Foot   Dorsiflexion: 5  Plantar flexion: 5    Right Ankle/Foot   Dorsiflexion: 5  Plantar flexion: 5      Balance  RS EO sway   RS EC increased sway unable to hold > 2-3 sec  Tandem stance unable to perform   Ambulation with head turns and nods unable to attempt    Therapy Education/Self Care [id number]   Details: Educated pt on anatomy, PT POC and HEP.   Date last updated:    Given Home Exercise Program  mobility training   Progress: New   Education provided to:  Patient   Level of understanding Verbalized and Demonstrated   Timed Minutes        Total Timed Treatment:     0   mins  Total Time of Visit:            45   mins    ASSESSMENT/PLAN       Assessment & Plan     Assessment  Impairments: abnormal gait, impaired balance, pain with function and safety issue  Functional Limitations: lifting, walking, uncomfortable because of pain, standing and stooping  Assessment details: Phoenix presents to the clinic today with variety of complaints as a result of a fall in 2020. He reports that since that time he has had increased pain in the neck and back along with overall impaired mobility. He was referred today for his balance. After discussion and evaluation it was deemed that he was not appropriate for therapy to address his balance at this time. He tends to fall backward and during ambulation presents with an ataxic pattern. He would not been safe to complete balance training with. He does have an upcoming appointment with a Neurologist and orthopedic MD which at this time should take priority. I  do believe that we could assist in his pain and mobility in the future after follow up with MD and additional diagnostic imaging. Patient agreed with this plan. Thank you for this referral.    Plan  Therapy options: will not be seen for skilled therapy services  Plan details: We will not see patient at this time to address his balance as there are other pertinent issues that need to be addressed first. We would be glad to work with him in the future if needed.        SIGNATURE: David Nelson PT [place], [place] License #: [id number]  Electronically Signed on 8/15/2022    Initial Certification  Certification Period: 8/15/2022 through 11/12/2022  I certify that the therapy services are furnished while this patient is under my care.  The services outlined above are required by this patient, and will be reviewed every 90 days.     PHYSICIAN: Severo Leonard DO (NPI: [id number])    Signature____________________________________________DATE: _________     Please sign and return via fax to [phone].   Thank you so much for letting us work with Sean. I appreciate your letting us work with your patients. If you have any questions or concerns, please don't hesitate to contact me.          [address] [id number]  [phone]

## 2022-09-07 DIAGNOSIS — R42 DIZZINESS: Primary | ICD-10-CM

## 2022-09-19 ENCOUNTER — APPOINTMENT (OUTPATIENT)
Dept: MRI IMAGING | Age: 57
End: 2022-09-19
Payer: MEDICAID

## 2022-09-19 ENCOUNTER — HOSPITAL ENCOUNTER (OUTPATIENT)
Dept: MRI IMAGING | Age: 57
Discharge: HOME | End: 2022-09-19
Payer: MEDICAID

## 2022-09-19 ENCOUNTER — HOSPITAL ENCOUNTER (OUTPATIENT)
Dept: NEUROLOGY | Age: 57
Discharge: HOME | End: 2022-09-19
Payer: MEDICAID

## 2022-09-19 DIAGNOSIS — R40.4 TRANSIENT ALTERATION OF AWARENESS: ICD-10-CM

## 2022-09-19 DIAGNOSIS — R42 DIZZINESS: ICD-10-CM

## 2022-09-19 PROCEDURE — 70551 MRI BRAIN STEM W/O DYE: CPT

## 2022-09-19 PROCEDURE — 95819 EEG AWAKE AND ASLEEP: CPT | Performed by: PSYCHIATRY & NEUROLOGY

## 2022-09-19 PROCEDURE — 95819 EEG AWAKE AND ASLEEP: CPT

## 2022-09-20 NOTE — PROCEDURES
ADULT OUTPATIENT ELECTROENCEPHALOGRAM REPORT    Patient:   Javier Ovalle  MR#:    [id number]  YOB: 1965  Date of Evaluation:  9/19/2022  Referring Physician:   ELIA Rust      CLINICAL INFORMATION:     This patient is a 61 y.o. male with a history of intermittent confusion. MEDICATIONS:     See MAR. RECORDING CONDITIONS:     This EEG was performed utilizing standard International 10-20 System of electrode placement, with additional channels monitored for eye movement. One channel electrocardiogram was monitored. Data was obtained, stored, and interpreted according to ACNS guidelines (J Clin Neurophysiol 2006;23(2):) utilizing referential montage recording, with reformatting to longitudinal, transverse bipolar, and referential montages as necessary for interpretation, along with the digital/automated EEG analysis. Patient tolerated entire procedure well. Photic stimulation and hyperventilation were utilized as activation procedures unless otherwise specified below. E.E.G. DESCRIPTION:     The resting predominant posterior background frequency is a 9-10 Hz 30-40 uV rhythm. No overt focal, lateralizing, or paroxysmal abnormalities were noted through the recording. Drowsiness was demonstrated by slow rolling eye movements followed by a loss of the background waking activities. Onset of stage I sleep was demonstrated by gradual disappearance of background waking rhythms with gradual symmetric mixed frequency 4-7 Hz slowing. Stage II sleep was characterized by vertex transients, sleep-spindles, and K-complexes, at times with shifting asymmetry demonstrated. Hyperventilation was not performed. Photic stimulation was performed and had little change on the recording. Muscle, motion, and eye movement artifacts were noted. EEG INTERPRETATION:    Normal EEG for age in the awake, drowsy, and sleep states.        CLINICAL CORRELATION:     The absence of epileptiform

## 2022-10-31 ENCOUNTER — TELEPHONE (OUTPATIENT)
Dept: NEUROLOGY | Age: 57
End: 2022-10-31

## 2022-10-31 NOTE — TELEPHONE ENCOUNTER
Ann Berg called to reschedule a  office visit with Michelle . Please be advised that the best time to call him to accommodate their needs is Anytime. Thank you.

## 2022-10-31 NOTE — TELEPHONE ENCOUNTER
Called and spoke with patient about him needing to reschedule his appointment due to lack of transportation. Patient is aware of when I have his appointment rescheduled too.

## 2022-11-04 ENCOUNTER — OFFICE VISIT (OUTPATIENT)
Dept: NEUROLOGY | Age: 57
End: 2022-11-04
Payer: MEDICAID

## 2022-11-04 VITALS
OXYGEN SATURATION: 95 % | BODY MASS INDEX: 29.82 KG/M2 | DIASTOLIC BLOOD PRESSURE: 88 MMHG | SYSTOLIC BLOOD PRESSURE: 130 MMHG | WEIGHT: 190 LBS | HEIGHT: 67 IN | HEART RATE: 135 BPM

## 2022-11-04 DIAGNOSIS — H53.9 VISION CHANGES: ICD-10-CM

## 2022-11-04 DIAGNOSIS — R40.4 TRANSIENT ALTERATION OF AWARENESS: ICD-10-CM

## 2022-11-04 DIAGNOSIS — G43.109 MIGRAINE WITH VISUAL AURA: Primary | ICD-10-CM

## 2022-11-04 PROCEDURE — 99213 OFFICE O/P EST LOW 20 MIN: CPT | Performed by: NURSE PRACTITIONER

## 2022-11-04 NOTE — PROGRESS NOTES
REVIEW OF SYSTEMS    Constitutional: []Fever []Sweats []Chills [] Recent Injury   [x] Denies all unless marked  HENT:[x]Headache  [] Head Injury  [] Sore Throat  [] Ear Pain  [] Dizziness [] Hearing Loss   [x] Denies all unless marked  Musculoskeletal: [] Arthralgia  [] Myalgias [] Muscle cramps  [] Muscle twitches   [x] Denies all unless marked   Spine:  [] Neck pain  [] Back pain  [] Sciatica  [x] Denies all unless marked  Neurological:[x] Visual Disturbance [] Double Vision [] Slurred Speech [] Trouble swallowing  [] Vertigo [] Tingling [] Numbness [] Weakness [] Loss of Balance   [] Loss of Consciousness [] Memory Loss [] Seizures  [x] Denies all unless marked  Psychiatric/Behavioral:[] Depression [] Anxiety  [x] Denies all unless marked  Sleep: []  Insomnia [] Sleep Disturbance [] Snoring [] Restless Legs [] Daytime Sleepiness [] Sleep Apnea  [x] Denies all unless marked

## 2022-11-04 NOTE — PROGRESS NOTES
[facility]:    Patient: Deon Nathan   :  1965  Age:  61 y.o. MRN:  [id number]  Today:  22    Provider: ELIA Cloud    Chief Complaint:  Chief Complaint   Patient presents with    Vision Change         HISTORY OF PRESENT ILLNESS:   Deon Nathan is a 61y.o. year old male follow up for migraines. He reports doing well overall. No clear changes to characteristics of headaches. Tenna Birks still occur in clusters. Has been about a week since he has had one. Patient relates that when optic migraine events occur he has flashing lights in his peripheral fields and his visual fields become narrowed. States it feels like he is looking through a tube. Light starts white then into rainbow colors, only in periphery. There is sometimes associated pain. He states when these occur they happen in clusters over the course of a month and then he can go a few months without having any. In March he had about 3-4 of these events. Events last anywhere from five minutes to hours. He has vertigo associated with these. Associated nausea. There is maybe some photophobia. Denies phonophobia, states he is normally sound sensitive to certain frequencies. He has found no triggers for these. Nothing seems to abort them. He states he typically lays down and tries to sleep when they occur and this does seem to help. Also has intermittent unilateral tinnitus that rotates ears. Is seeing ENT for this. Not correlated with optic events. In 2020 patient had mild TBI from fall. Has had short term memory problems since. Has history of aneurysm in mother. He also states years ago he had event where he had severe occipital pain and fell into floor unconscious. He states a doctor told him he had a small aneurysm rupture. He also relates he \"loses chunks of time\" and sometimes spaces out when people talk to him. He does not ever recall doing this. No known history of seizures. MRI completed was WNL. EEG WNL. Insurance would not approve MRA. PAST MEDICAL HISTORY:    Medical History:      Diagnosis Date    Fibromyalgia     Hiatal hernia     Low testosterone     Tourette's        Surgical History:      Procedure Laterality Date    KNEE ARTHROSCOPY      MASTECTOMY, BILATERAL      SHOULDER ARTHROSCOPY      THROAT SURGERY      TONSILLECTOMY AND ADENOIDECTOMY         Current Medications:  Current Outpatient Medications   Medication Sig Dispense Refill    fluticasone-salmeterol (ADVAIR) 250-50 MCG/ACT AEPB diskus inhaler Inhale 1 puff into the lungs 2 times daily      cetirizine (ZYRTEC) 10 MG tablet Take 10 mg by mouth as needed      tamoxifen (NOLVADEX) 10 MG tablet       testosterone cypionate (DEPOTESTOTERONE CYPIONATE) 200 MG/ML injection Inject 1 mL into the muscle once a week. pantoprazole (PROTONIX) 40 MG tablet Take 1 tablet by mouth in the morning. famotidine (PEPCID) 40 MG tablet Take 1 tablet by mouth nightly      meclizine (ANTIVERT) 25 MG tablet Take 1 tablet by mouth as needed      albuterol sulfate HFA (VENTOLIN HFA) 108 (90 Base) MCG/ACT inhaler Inhale 2 puffs into the lungs every 6 hours as needed for Wheezing 1 Inhaler 3     No current facility-administered medications for this visit.        Allergies:  Soma [carisoprodol], Cortisporin [bacitra-neomycin-polymyxin-hc], Flexeril [cyclobenzaprine], Gabapentin, Tetracyclines & related, Lyrica [pregabalin], and Robaxin [methocarbamol]    SOCIAL HISTORY:   Social History     Socioeconomic History    Marital status:      Spouse name: Not on file    Number of children: Not on file    Years of education: Not on file    Highest education level: Not on file   Occupational History    Not on file   Tobacco Use    Smoking status: Every Day     Packs/day: 1.00     Types: Cigarettes    Smokeless tobacco: Never   Vaping Use    Vaping Use: Never used   Substance and Sexual Activity    Alcohol use: No    Drug use: No    Sexual activity: Never     Comment: Pt is a transgender   Other Topics Concern    Not on file   Social History Narrative    Not on file     Social Determinants of Health     Financial Resource Strain: Not on file   Food Insecurity: Not on file   Transportation Needs: Not on file   Physical Activity: Not on file   Stress: Not on file   Social Connections: Not on file   Intimate Partner Violence: Not on file   Housing Stability: Not on file       FAMILY HISTORY:   History reviewed. No pertinent family history. REVIEW OF SYSTEMS:  Constitutional: []Fever []Sweats []Chills [] Recent Injury   [x] Denies all unless marked  HENT:[x]Headache  [] Head Injury  [] Sore Throat  [] Ear Pain  [] Dizziness [] Hearing Loss   [x] Denies all unless marked  Musculoskeletal: [] Arthralgia  [] Myalgias [] Muscle cramps  [] Muscle twitches   [x] Denies all unless marked   Spine:  [] Neck pain  [] Back pain  [] Sciatica  [x] Denies all unless marked  Neurological:[x] Visual Disturbance [] Double Vision [] Slurred Speech [] Trouble swallowing  [] Vertigo [] Tingling [] Numbness [] Weakness [] Loss of Balance   [] Loss of Consciousness [] Memory Loss [] Seizures  [x] Denies all unless marked  Psychiatric/Behavioral:[] Depression [] Anxiety  [x] Denies all unless marked  Sleep: []  Insomnia [] Sleep Disturbance [] Snoring [] Restless Legs [] Daytime Sleepiness [] Sleep Apnea  [x] Denies all unless marked    The MA has completed the ROS with the patient. I have reviewed it in its' entirety with the patient and agree with the documentation.       PHYSICAL EXAMINATION:  Vitals: /88   Pulse (!) 135   Ht 5' 7\" (1.702 m)   Wt 190 lb (86.2 kg)   SpO2 95%   BMI 29.76 kg/m²   Constitutional - No acute distress    HEENT- Conjunctiva normal.  No scars, masses, or lesions over external nose or ears, no neck masses noted, no jugular vein distension, no bruit  Cardiac- Tachycardic  Pulmonary- Clear to auscultation, good expansion, normal effort without use of accessory muscles  Musculoskeletal - No significant wasting of muscles noted, no bony deformities  Extremities - No clubbing, cyanosis or edema  Skin - Warm, dry, and intact. No rash, erythema, or pallor  Psychiatric - Mood, affect, and behavior appear normal          NEUROLOGIC EXAMINATION:    Mental status   [x]Awake, alert, oriented   [x]Affect attention and concentration appear appropriate  [x]Recent and remote memory appears unremarkable  []Speech normal without dysarthria or aphasia, comprehension and repetition intact. COMMENTS: vocal tic's   Cranial Nerves [x]No VF deficit to confrontation  [x]PERRLA, EOMI, no nystagmus, conjugate eye movements, no ptosis  [x]Face symmetric  [x]Facial sensation intact  [x]Tongue midline no atrophy or fasciculations present  [x]Palate midline, hearing to finger rub normal bilaterally  [x]Shoulder shrug and SCM testing normal bilaterally  COMMENTS:   Motor   [x]5/5 strength x 4 extremities  [x]Normal bulk and tone  [x]No tremor present  [x]No rigidity or bradykinesia noted  COMMENTS:   Sensory  [x]Sensation intact to light touch, vibration, and proprioception BLE  [x]Sensation intact to light touch, vibration, and proprioception BUE  COMMENTS:   Coordination [x]FTN normal bilaterally   [x]KEVIN normal bilaterally.    COMMENTS:   Reflexes  [x]Symmetric and non-pathological  [x]Toes down going bilaterally  [x]No clonus present  COMMENTS:   Gait                  []Normal steady gait    [x]Ataxic    []Spastic     []Magnetic     []Shuffling  COMMENTS:       ADDITIONAL REVIEW:  No results found for: [id number]  Lab Results   Component Value Date    WBC 4.7 (L) 10/26/2018    HGB 14.0 10/26/2018    HCT 41.6 (L) 10/26/2018    MCV 91.8 10/26/2018     10/26/2018     Lab Results   Component Value Date     10/26/2018    K 4.2 10/26/2018     10/26/2018    CO2 23 10/26/2018    BUN 15 10/26/2018    CREATININE 0.9 10/26/2018    GLUCOSE 107 10/26/2018    CALCIUM 9.1 10/26/2018    PROT 6.3 (L) 10/26/2018    LABALBU 4.0 10/26/2018    BILITOT <0.2 10/26/2018    ALKPHOS 91 10/26/2018    AST 14 10/26/2018    ALT 15 10/26/2018    LABGLOM >60 10/26/2018     MRI Brain 9/7/22  arrative   MRI of the brain without contrast:   INDICATION: 61year-old patient with dizziness   COMPARISON: CT of the head from 7/20/2020   TECHNIQUE: Sagittal T1; axial diffusion, ADC, T2, FLAIR images through the brain   were obtained without intravenous contrast administered. FINDINGS: No obvious restricted diffusion is seen. The cervicomedullary junction   is unremarkable. The visualized orbits also appear unremarkable. No extra-axial   fluid collection is seen. No midline shift or mass effect is seen. The   visualized vascular flow voids appear normal.The cerebellopontine angles appear   normal.No abnormal signal is seen to suggest acute hemorrhage. The gray-white   matter differentiation is normal.       Impression   No acute intracranial process is seen. EEG 9/20/22  WNL    IMPRESSION:  Cecilio Cummings is a 61 y.o. male here today for follow up of optic migraines. No changes to headaches, reports doing better overall. Headaches still happen in clusters where he may have several over a week then none for 3 weeks. He also had reported spacing out and losing chunks of time. No GTC, incontinence, seizure history. No worsening to this, no changes. States it has been going on since covid infection. MRI brain was WNL, EEG WNL. MRA was not approved through insurance. There is family history of aneurysm though this is felt less likely. Inflammatory labs were WNL. Exam today non-focal. While talking about pharmacological management patient confesses he does not take his medicines compliantly. He relates he forgets when he takes them, sometimes misses doses, sometimes takes an extra pill by accident. He does not want to start any new daily medicines. Triptans are contraindicated due to visual auras.  Will given him samples of Ubrelvy to try. ICD-10-CM    1. Migraine with visual aura  G43.109       2. Transient alteration of awareness  R40.4       3. Vision changes  H53.9           PLAN:  Ubrelvy samples given today. Epilepsy precautions and seizure first aid discussed. No heights, swimming, tub baths, open flames, or heavy machinery. Driving per [employer] 3. Patient advised of the pathophysiology, etiology, and diagnosis of migraines, along with treatment options, and treatment side effects. 4. Return in about 3 months (around 2/4/2023) for Follow up, sooner with worsening symptoms.      Margie Bauer, APRN - CNP
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
## 2022-11-14 ENCOUNTER — PROCEDURE VISIT (OUTPATIENT)
Dept: OTOLARYNGOLOGY | Facility: CLINIC | Age: 57
End: 2022-11-14

## 2022-11-14 ENCOUNTER — OFFICE VISIT (OUTPATIENT)
Dept: OTOLARYNGOLOGY | Facility: CLINIC | Age: 57
End: 2022-11-14

## 2022-11-14 VITALS
TEMPERATURE: 97.5 F | RESPIRATION RATE: 16 BRPM | WEIGHT: 180 LBS | SYSTOLIC BLOOD PRESSURE: 143 MMHG | DIASTOLIC BLOOD PRESSURE: 108 MMHG | HEIGHT: 67 IN | HEART RATE: 101 BPM | BODY MASS INDEX: 28.25 KG/M2

## 2022-11-14 DIAGNOSIS — H93.13 TINNITUS OF BOTH EARS: ICD-10-CM

## 2022-11-14 DIAGNOSIS — H90.3 SENSORINEURAL HEARING LOSS (SNHL) OF BOTH EARS: Primary | ICD-10-CM

## 2022-11-14 DIAGNOSIS — R42 VERTIGO: ICD-10-CM

## 2022-11-14 DIAGNOSIS — R42 DIZZINESS: ICD-10-CM

## 2022-11-14 PROCEDURE — 92557 COMPREHENSIVE HEARING TEST: CPT

## 2022-11-14 PROCEDURE — 99213 OFFICE O/P EST LOW 20 MIN: CPT | Performed by: NURSE PRACTITIONER

## 2022-11-14 PROCEDURE — 92567 TYMPANOMETRY: CPT

## 2022-11-14 RX ORDER — AZELASTINE 1 MG/ML
2 SPRAY, METERED NASAL 2 TIMES DAILY
Qty: 30 ML | Refills: 11 | Status: SHIPPED | OUTPATIENT
Start: 2022-11-14

## 2022-11-14 RX ORDER — ERGOCALCIFEROL 1.25 MG/1
CAPSULE ORAL
COMMUNITY
Start: 2022-08-22

## 2022-11-14 NOTE — PATIENT INSTRUCTIONS
Low sodium diet  Fall precautions discussed  Vestibular rehab discussed with patient, he wishes to hold off on this  Use astelin as directed  Tinnitus masking techniques    HEARING LOSS       Hearing loss is the third most common health condition in the [place] affecting more than 1 of every 10 people.  This means that over 28 million Americans suffer from hearing loss.  The condition varies with age: 1 out of every 25 children, 1 out of every 14 aged 14-44 years old, 1 out of every 7 adults aged 45-65 year old, and 1 out of every 3 adults over the age of 65 years old.    The ear works by receiving sound vibrations, amplifying the sound, and then converting the mechanical vibration into an electrical signal that is sent to the hearing center of the brain.  The ear has three basic parts, each with very specific functions:  The External Ear   This is made up of the parts of the ear you can see (the auricle), and the ear canal.  These structures function to funnel the sound vibrations down into the ear so that they can be processed.  The Middle Ear  The external ear and the middle ear are  by the eardrum (tympanic membrane).  The middle ear is an air-filled space that houses the middle ear bones (ossicles).  Sound waves hit the eardrum and cause it to vibrate.  The vibrations are then transferred to the ear bones that amplify the sound and transfer it to the inner ear (cochlea).  The Inner Ear  The inner ear is a snail-shaped bone that contains a fluid-filled membrane inside another fluid-filled membrane.  It acts like a battery to transform the mechanical vibrations into and electrical signal.  It does this with very delicate hair cells that rest on top of the inner ear membranes.  The electrical signal is then shipped out of the inner ear to the brain where it is processed and understood as sound.    Disease in any one of these parts of the ear can cause hearing loss, but can do so in two different  ways.  When there is a problem with the process of bringing the sound to the inner ear (i.e. problems in the external or middle ear) it is called a conductive hearing loss.  Problems in the inner ear or nerves of hearing care called sensorineural hearing losses.  Often times, however, there is a combination of the types of hearing losses or a mixed hearing loss.  Conductive Hearing Loss  Impairment of the transfer of sound into the inner ear because of problems with the external ear, the eardrum or the middle ear can cause conductive hearing loss.  Conductive losses can often be lessened or cured with medication or surgery, depending on the etiology.  Causes of Conductive Hearing Losses:  Wax occlusion of the ear canal  Eardrum perforations  Stiffened or scarred eardrums  Fluid in the middle earspace (otitis media with effusion)  Middle ear infections (acute otitis media)  Scarring or fixation of the ear bones (tympanosclerosis, otosclerosis)  Dislocation of the ear bones  Cholesteatoma: this is a “skin cyst” that develops due to severely retracted eardrums or from skin growth into the middle ear from a chronic eardrum perforation.  Over time the cyst can grow and erode the bones of hearing.  Middle ear tumors or masses  Sensorineural Hearing Loss  Deficits in hearing due to problems in the inner ear or nerves of hearing are termed sensorineural losses.  These are usually due to damage to the microscopic hair cells in the cochlea, or due to loss of the nerve cells in the hearing nerve.  For the most part, this type of hearing loss cannot be repaired with medication or surgery, except in rare instances.  Often times, however, it can be helped with hearing aids and rarely with cochlear implantation.  This type of loss can also be associated with tinnitus (ringing of the ears) and vertigo (dizziness).  Causes of Sensorineural Hearing Losses:  Damage from noise exposure  Aging:  this is often a slow, progressive loss of  the high frequencies  Infection (labrynthitis)  Secondary loss from the effects of meningitis  Genetic/familial related hearing loss  Autoimmune hearing loss (a rheumatoid-like process)  Temporal bond fracture (severe fracture of the bone around or through the inner ear)  Medication induced damage (chemotherapy, high dose IV antibiotics, diuretics)  Inner ear and skull base tumors (acoustic neuroma, memingioma)        Specific Causes    Ear Infections and Effusion in Children  Any time the middle ear is filled with fluid, as in an active infection or in middle ear effusion, there can be a conductive hearing.  Ear infections are the most common cause of infant hearing loss and are a very treatable type of hearing loss.  Usually the middle ear is filled with air, which allows the bones of hearing to freely move.  When there is fluid in this space it slows the vibration and is literally like trying to hear underwater.  Most of the time this infection or fluid can be treated with medication.  If this does not happen, often myringotomy tube placement can allow for drainage of fluid and allow the middle ear space to remain ventilated.  Congenital Hearing Loss  Hearing loss is the most common birth defect, affecting 3 in every 1000 children born in the [place].  If untreated, this can lead to significant problems in speech, language, and learning.  Recently, most hospitals have early infant screening that can identify newborns with hearing problems.  With accurate diagnosis, these children can often be treated with hearing aids and other forms of treatment that can allow them to develop normal speech and learning.  Noise Induced Hearing Loss  According to the National Institutes of Health, approximately 10 million Americans have hearing losses that are a least partially attributable to loud noise exposure.  Exposures that can cause permanent damage vary, but short exposures of very loud quality (gunfire) can be as  bad as longer exposures at lower levels.  Most conversations are at about 65-70 decibels.  Levels over 85dB, with exposures of up to 8 hours a day, will produce permanent hearing loss after many years of exposure.  A stereo headset turned up full blast (about 110 dB) can cause a significant hearing loss in less than a half an hour.  These losses are due to damage of the hair cells of the inner ear from the excessive vibrational acoustic trauma of the loud noise.  Since this loss is usually nonreversible, hearing protection is essential.  Foam earplugs can provide 15-30dB of noise protection.  Age Related Hearing Loss (Presbyacusis)  Age related hearing loss is another very common form of hearing loss in the elderly.  It is usually a slowly progressive, high frequency sensorineural loss that in nonreversible.  Not all elderly people will have hearing loss as this is very much related to the genetic makeup of the individual.  This can cause the patient to withdraw from social situations, or cause them to seem to have a mood change because of the frustration of not being able to hear a conversation or having to ask people to repeat things.  Most of the time, this can be well treated by amplifying sounds with a hearing aid.    Hearing loss can have a large impact in the way we function on a day to day basis with our environment.  We are fortunate that most hearing loss can be treated by hearing aids or medical and surgical treatments.  If you think you may be suffering from hearing loss, an evaluation by our doctor can help identify the cause and the specific treatment that can help improve your hearing.

## 2022-11-14 NOTE — PROGRESS NOTES
YOB: 1965  Location: [facility]  Location Address: [address] [place] [address], [facility], [address] [place] [address]  Location Phone: [phone]    Chief Complaint   Patient presents with   • Tinnitus   • Hearing Loss   • Dizziness   • imbalance     Falls a lot and says that it is not when he is dizzy        History of Present Illness  Sean Garcia is a 57 y.o. male.  Sean Garcia is here for evaluation of ENT complaints. The patient has had problems with tinnitus, hearing loss, and  dizziness.  The symptoms are not localized to a particular location. The patient has had mild to moderate symptoms. The symptoms have been present for the last several years. There have been no identified factors that aggravate the symptoms. There have been no factors that have improved the symptoms.    He reports room spinning. He takes meclizine as needed. He has been evaluated by neurology and was cleared.    Procedure visit with Bridget Silver AUD (2022)       Past Medical History:   Diagnosis Date   • ADD (attention deficit disorder) without hyperactivity    • Anxiety    • Depression    • Facet syndrome    • GERD (gastroesophageal reflux disease)    • Tourette's syndrome        Past Surgical History:   Procedure Laterality Date   • KNEE ARTHROSCOPY Left    • MASTECTOMY RADICAL Bilateral    • NASAL SINUS SURGERY      Burned excess tissue from nasal passages   • SHOULDER SURGERY Right    • TONSILLECTOMY AND ADENOIDECTOMY         Outpatient Medications Marked as Taking for the 22 encounter (Office Visit) with Leonidas Potter APRN   Medication Sig Dispense Refill   • Advair Diskus 250-50 MCG/DOSE DISKUS Inhale 1 puff 2 (Two) Times a Day.     • cetirizine (zyrTEC) 10 MG tablet Take 10 mg by mouth As Needed.     • famotidine (PEPCID) 40 MG tablet Take 1 tablet by mouth 2 (Two) Times a Day.     • pantoprazole (PROTONIX) 40 MG EC tablet Take 1 tablet by mouth 2 (Two) Times a Day.     • ProAir  (90 Base)  MCG/ACT inhaler Inhale 2 puffs As Needed.     • Testosterone Cypionate (DEPOTESTOTERONE CYPIONATE) 200 MG/ML injection Inject 1 mL into the appropriate muscle as directed by prescriber 1 (One) Time Per Week.     • vitamin D (ERGOCALCIFEROL) 1.25 MG (27422 UT) capsule capsule          Cyclobenzaprine, Codeine, Cortisporin [bacitra-neomycin-polymyxin-hc], Gabapentin, Lithium, Lyrica [pregabalin], Paxil [paroxetine], Soma [carisoprodol], Tetracyclines & related, and Methocarbamol    Family History   Problem Relation Age of Onset   • Colon cancer Neg Hx    • Colon polyps Neg Hx    • Esophageal cancer Neg Hx    • Liver cancer Neg Hx    • Liver disease Neg Hx    • Rectal cancer Neg Hx    • Stomach cancer Neg Hx        Social History     Socioeconomic History   • Marital status: Single   Tobacco Use   • Smoking status: Former   • Smokeless tobacco: Never   Vaping Use   • Vaping Use: Former   Substance and Sexual Activity   • Alcohol use: Not Currently   • Drug use: Never   • Sexual activity: Defer       Review of Systems   Constitutional: Negative.    HENT: Positive for hearing loss and tinnitus. Negative for ear discharge and ear pain.    Eyes: Negative.    Respiratory: Negative.    Cardiovascular: Negative.    Genitourinary: Negative.    Musculoskeletal: Negative.    Skin: Negative.    Neurological: Positive for dizziness.   Hematological: Negative.    Psychiatric/Behavioral: Negative.        Vitals:    11/14/22 0844   BP: (!) 143/108   Pulse: 101   Resp: 16   Temp: 97.5 °F (36.4 °C)       Body mass index is 28.19 kg/m².    Objective     Physical Exam  Vitals reviewed.   Constitutional:       Appearance: Normal appearance. He is normal weight.   HENT:      Head: Normocephalic and atraumatic.      Right Ear: Tympanic membrane, ear canal and external ear normal. Decreased hearing noted.      Left Ear: Tympanic membrane, ear canal and external ear normal. Decreased hearing noted.      Nose: Nose normal.      Mouth/Throat:       Lips: Pink.      Mouth: Mucous membranes are moist.      Pharynx: Oropharynx is clear. Uvula midline.   Musculoskeletal:      Cervical back: Full passive range of motion without pain.   Neurological:      Mental Status: He is alert.   Psychiatric:         Behavior: Behavior is cooperative.         Assessment & Plan   Diagnoses and all orders for this visit:    1. Sensorineural hearing loss (SNHL) of both ears (Primary)  -     Comprehensive Hearing Test; Future  -     Comprehensive Hearing Test    2. Tinnitus of both ears  -     Comprehensive Hearing Test; Future  -     Comprehensive Hearing Test    3. Vertigo  -     Comprehensive Hearing Test; Future  -     Comprehensive Hearing Test    Other orders  -     azelastine (ASTELIN) 0.1 % nasal spray; 2 sprays into the nostril(s) as directed by provider 2 (Two) Times a Day. Use in each nostril as directed  Dispense: 30 mL; Refill: 11      * Surgery not found *  Orders Placed This Encounter   Procedures   • Comprehensive Hearing Test     Standing Status:   Future     Standing Expiration Date:   11/14/2023     Order Specific Question:   Laterality     Answer:   Bilateral   • Comprehensive Hearing Test     Order Specific Question:   Laterality     Answer:   Bilateral     Low sodium diet  Fall precautions discussed  Vestibular rehab discussed with patient, he wishes to hold off on this  Use astelin as directed  Tinnitus masking techniques    Return in about 1 year (around 11/14/2023) for Recheck.       Patient Instructions   Low sodium diet  Fall precautions discussed  Vestibular rehab discussed with patient, he wishes to hold off on this  Use astelin as directed  Tinnitus masking techniques    HEARING LOSS       Hearing loss is the third most common health condition in the [place] affecting more than 1 of every 10 people.  This means that over 28 million Americans suffer from hearing loss.  The condition varies with age: 1 out of every 25 children, 1 out of every  14 aged 14-44 years old, 1 out of every 7 adults aged 45-65 year old, and 1 out of every 3 adults over the age of 65 years old.    The ear works by receiving sound vibrations, amplifying the sound, and then converting the mechanical vibration into an electrical signal that is sent to the hearing center of the brain.  The ear has three basic parts, each with very specific functions:  The External Ear   This is made up of the parts of the ear you can see (the auricle), and the ear canal.  These structures function to funnel the sound vibrations down into the ear so that they can be processed.  The Middle Ear  The external ear and the middle ear are  by the eardrum (tympanic membrane).  The middle ear is an air-filled space that houses the middle ear bones (ossicles).  Sound waves hit the eardrum and cause it to vibrate.  The vibrations are then transferred to the ear bones that amplify the sound and transfer it to the inner ear (cochlea).  The Inner Ear  The inner ear is a snail-shaped bone that contains a fluid-filled membrane inside another fluid-filled membrane.  It acts like a battery to transform the mechanical vibrations into and electrical signal.  It does this with very delicate hair cells that rest on top of the inner ear membranes.  The electrical signal is then shipped out of the inner ear to the brain where it is processed and understood as sound.    Disease in any one of these parts of the ear can cause hearing loss, but can do so in two different ways.  When there is a problem with the process of bringing the sound to the inner ear (i.e. problems in the external or middle ear) it is called a conductive hearing loss.  Problems in the inner ear or nerves of hearing care called sensorineural hearing losses.  Often times, however, there is a combination of the types of hearing losses or a mixed hearing loss.  Conductive Hearing Loss  Impairment of the transfer of sound into the inner ear because of  problems with the external ear, the eardrum or the middle ear can cause conductive hearing loss.  Conductive losses can often be lessened or cured with medication or surgery, depending on the etiology.  Causes of Conductive Hearing Losses:  • Wax occlusion of the ear canal  • Eardrum perforations  • Stiffened or scarred eardrums  • Fluid in the middle earspace (otitis media with effusion)  • Middle ear infections (acute otitis media)  • Scarring or fixation of the ear bones (tympanosclerosis, otosclerosis)  • Dislocation of the ear bones  • Cholesteatoma: this is a “skin cyst” that develops due to severely retracted eardrums or from skin growth into the middle ear from a chronic eardrum perforation.  Over time the cyst can grow and erode the bones of hearing.  • Middle ear tumors or masses  Sensorineural Hearing Loss  Deficits in hearing due to problems in the inner ear or nerves of hearing are termed sensorineural losses.  These are usually due to damage to the microscopic hair cells in the cochlea, or due to loss of the nerve cells in the hearing nerve.  For the most part, this type of hearing loss cannot be repaired with medication or surgery, except in rare instances.  Often times, however, it can be helped with hearing aids and rarely with cochlear implantation.  This type of loss can also be associated with tinnitus (ringing of the ears) and vertigo (dizziness).  Causes of Sensorineural Hearing Losses:  • Damage from noise exposure  • Aging:  this is often a slow, progressive loss of the high frequencies  • Infection (labrynthitis)  • Secondary loss from the effects of meningitis  • Genetic/familial related hearing loss  • Autoimmune hearing loss (a rheumatoid-like process)  • Temporal bond fracture (severe fracture of the bone around or through the inner ear)  • Medication induced damage (chemotherapy, high dose IV antibiotics, diuretics)  • Inner ear and skull base tumors (acoustic neuroma,  memingioma)        Specific Causes    Ear Infections and Effusion in Children  Any time the middle ear is filled with fluid, as in an active infection or in middle ear effusion, there can be a conductive hearing.  Ear infections are the most common cause of infant hearing loss and are a very treatable type of hearing loss.  Usually the middle ear is filled with air, which allows the bones of hearing to freely move.  When there is fluid in this space it slows the vibration and is literally like trying to hear underwater.  Most of the time this infection or fluid can be treated with medication.  If this does not happen, often myringotomy tube placement can allow for drainage of fluid and allow the middle ear space to remain ventilated.  Congenital Hearing Loss  Hearing loss is the most common birth defect, affecting 3 in every 1000 children born in the [place].  If untreated, this can lead to significant problems in speech, language, and learning.  Recently, most hospitals have early infant screening that can identify newborns with hearing problems.  With accurate diagnosis, these children can often be treated with hearing aids and other forms of treatment that can allow them to develop normal speech and learning.  Noise Induced Hearing Loss  According to the National Institutes of Health, approximately 10 million Americans have hearing losses that are a least partially attributable to loud noise exposure.  Exposures that can cause permanent damage vary, but short exposures of very loud quality (gunfire) can be as bad as longer exposures at lower levels.  Most conversations are at about 65-70 decibels.  Levels over 85dB, with exposures of up to 8 hours a day, will produce permanent hearing loss after many years of exposure.  A stereo headset turned up full blast (about 110 dB) can cause a significant hearing loss in less than a half an hour.  These losses are due to damage of the hair cells of the inner ear from  the excessive vibrational acoustic trauma of the loud noise.  Since this loss is usually nonreversible, hearing protection is essential.  Foam earplugs can provide 15-30dB of noise protection.  Age Related Hearing Loss (Presbyacusis)  Age related hearing loss is another very common form of hearing loss in the elderly.  It is usually a slowly progressive, high frequency sensorineural loss that in nonreversible.  Not all elderly people will have hearing loss as this is very much related to the genetic makeup of the individual.  This can cause the patient to withdraw from social situations, or cause them to seem to have a mood change because of the frustration of not being able to hear a conversation or having to ask people to repeat things.  Most of the time, this can be well treated by amplifying sounds with a hearing aid.    Hearing loss can have a large impact in the way we function on a day to day basis with our environment.  We are fortunate that most hearing loss can be treated by hearing aids or medical and surgical treatments.  If you think you may be suffering from hearing loss, an evaluation by our doctor can help identify the cause and the specific treatment that can help improve your hearing.

## 2023-06-29 ENCOUNTER — HOSPITAL ENCOUNTER (OUTPATIENT)
Dept: MRI IMAGING | Age: 58
Discharge: HOME | End: 2023-06-29
Payer: MEDICAID

## 2023-06-29 ENCOUNTER — HOSPITAL ENCOUNTER (OUTPATIENT)
Dept: NEUROLOGY | Age: 58
Discharge: HOME | End: 2023-06-29
Payer: MEDICAID

## 2023-06-29 ENCOUNTER — TELEPHONE (OUTPATIENT)
Dept: NEUROSURGERY | Age: 58
End: 2023-06-29

## 2023-06-29 DIAGNOSIS — M54.2 CERVICAL PAIN: ICD-10-CM

## 2023-06-29 DIAGNOSIS — H54.61 VISION LOSS OF RIGHT EYE: ICD-10-CM

## 2023-06-29 DIAGNOSIS — M79.601 PAIN IN BOTH UPPER EXTREMITIES: ICD-10-CM

## 2023-06-29 DIAGNOSIS — R29.898 WEAKNESS OF HAND: ICD-10-CM

## 2023-06-29 DIAGNOSIS — R93.7 ABNORMAL X-RAY OF CERVICAL SPINE: ICD-10-CM

## 2023-06-29 DIAGNOSIS — M79.602 PAIN IN BOTH UPPER EXTREMITIES: ICD-10-CM

## 2023-06-29 PROCEDURE — 95911 NRV CNDJ TEST 9-10 STUDIES: CPT

## 2023-06-29 PROCEDURE — 70551 MRI BRAIN STEM W/O DYE: CPT

## 2023-06-29 PROCEDURE — 95909 NRV CNDJ TST 5-6 STUDIES: CPT

## 2023-06-29 PROCEDURE — 95911 NRV CNDJ TEST 9-10 STUDIES: CPT | Performed by: PSYCHIATRY & NEUROLOGY

## 2023-06-29 PROCEDURE — 95886 MUSC TEST DONE W/N TEST COMP: CPT | Performed by: PSYCHIATRY & NEUROLOGY

## 2023-06-29 PROCEDURE — 95886 MUSC TEST DONE W/N TEST COMP: CPT

## 2023-07-06 ENCOUNTER — TELEPHONE (OUTPATIENT)
Dept: PSYCHIATRY | Age: 58
End: 2023-07-06

## 2023-07-06 NOTE — TELEPHONE ENCOUNTER
Called pt to schedule an appt from a referral    Scheduled with Dg Rivera for 07/12/23 @ 1.     Electronically signed by Ericka Crane MA on 7/6/2023 at 4:04 PM

## 2023-07-11 ENCOUNTER — TELEPHONE (OUTPATIENT)
Dept: PSYCHIATRY | Age: 58
End: 2023-07-11

## 2023-07-11 NOTE — TELEPHONE ENCOUNTER
Called and confirmed appt with pt for 7/12 @ 1 PM    Electronically signed by Aura Olea on 7/11/2023 at 3:33 PM

## 2023-07-12 ENCOUNTER — OFFICE VISIT (OUTPATIENT)
Dept: PSYCHIATRY | Age: 58
End: 2023-07-12

## 2023-07-12 VITALS
TEMPERATURE: 97.7 F | WEIGHT: 165 LBS | SYSTOLIC BLOOD PRESSURE: 152 MMHG | HEIGHT: 67 IN | DIASTOLIC BLOOD PRESSURE: 84 MMHG | BODY MASS INDEX: 25.9 KG/M2 | HEART RATE: 97 BPM | OXYGEN SATURATION: 93 %

## 2023-07-12 DIAGNOSIS — G47.00 INSOMNIA, UNSPECIFIED TYPE: ICD-10-CM

## 2023-07-12 DIAGNOSIS — G47.19 EXCESSIVE DAYTIME SLEEPINESS: ICD-10-CM

## 2023-07-12 DIAGNOSIS — F60.9 PERSONALITY DISORDER, UNSPECIFIED (HCC): ICD-10-CM

## 2023-07-12 DIAGNOSIS — F41.9 ANXIETY DISORDER, UNSPECIFIED TYPE: ICD-10-CM

## 2023-07-12 DIAGNOSIS — F39 UNSPECIFIED MOOD (AFFECTIVE) DISORDER (HCC): Primary | ICD-10-CM

## 2023-07-12 PROBLEM — Z78.9 FEMALE-TO-MALE TRANSGENDER PERSON: Status: ACTIVE | Noted: 2023-07-12

## 2023-07-12 RX ORDER — RISPERIDONE 0.5 MG/1
0.5 TABLET ORAL 2 TIMES DAILY
COMMUNITY

## 2023-07-12 RX ORDER — HYDROXYZINE HYDROCHLORIDE 25 MG/1
25 TABLET, FILM COATED ORAL 3 TIMES DAILY PRN
Qty: 90 TABLET | Refills: 0 | Status: SHIPPED | OUTPATIENT
Start: 2023-07-12

## 2023-07-12 RX ORDER — TRAZODONE HYDROCHLORIDE 50 MG/1
50 TABLET ORAL NIGHTLY
Qty: 30 TABLET | Refills: 0 | Status: SHIPPED | OUTPATIENT
Start: 2023-07-12 | End: 2023-07-14 | Stop reason: SINTOL

## 2023-07-12 ASSESSMENT — PATIENT HEALTH QUESTIONNAIRE - PHQ9
8. MOVING OR SPEAKING SO SLOWLY THAT OTHER PEOPLE COULD HAVE NOTICED. OR THE OPPOSITE, BEING SO FIGETY OR RESTLESS THAT YOU HAVE BEEN MOVING AROUND A LOT MORE THAN USUAL: 2
3. TROUBLE FALLING OR STAYING ASLEEP: 3
6. FEELING BAD ABOUT YOURSELF - OR THAT YOU ARE A FAILURE OR HAVE LET YOURSELF OR YOUR FAMILY DOWN: 2
2. FEELING DOWN, DEPRESSED OR HOPELESS: 3
SUM OF ALL RESPONSES TO PHQ9 QUESTIONS 1 & 2: 6
SUM OF ALL RESPONSES TO PHQ QUESTIONS 1-9: 20
4. FEELING TIRED OR HAVING LITTLE ENERGY: 3
10. IF YOU CHECKED OFF ANY PROBLEMS, HOW DIFFICULT HAVE THESE PROBLEMS MADE IT FOR YOU TO DO YOUR WORK, TAKE CARE OF THINGS AT HOME, OR GET ALONG WITH OTHER PEOPLE: 2
7. TROUBLE CONCENTRATING ON THINGS, SUCH AS READING THE NEWSPAPER OR WATCHING TELEVISION: 2
SUM OF ALL RESPONSES TO PHQ QUESTIONS 1-9: 20
SUM OF ALL RESPONSES TO PHQ QUESTIONS 1-9: 20
SUM OF ALL RESPONSES TO PHQ QUESTIONS 1-9: 19
9. THOUGHTS THAT YOU WOULD BE BETTER OFF DEAD, OR OF HURTING YOURSELF: 1
1. LITTLE INTEREST OR PLEASURE IN DOING THINGS: 3
5. POOR APPETITE OR OVEREATING: 1

## 2023-07-12 NOTE — PROGRESS NOTES
3 times daily as needed for Anxiety 7/12/23  Yes ELIA Bailey - CNP   rosuvastatin (CRESTOR) 10 MG tablet  3/9/23   Historical Provider, MD   naproxen (NAPROSYN) 500 MG tablet  4/6/23   Historical Provider, MD   ergocalciferol (ERGOCALCIFEROL) 1.25 MG (13135 UT) capsule  8/22/22   Historical Provider, MD   testosterone enanthate (DELATESTRYL) 200 MG/ML injection  3/29/23   Historical Provider, MD   Ubrogepant (UBRELVY) 100 MG TABS Take 1 tablet at the onset of migraine. May repeat once in 2 hours if no improvement. Do not exceed 2 tablets in 24 hours. 4/12/23   ELIA Stephens - KENZIE   fluticasone-salmeterol (ADVAIR) 250-50 MCG/ACT AEPB diskus inhaler Inhale 1 puff into the lungs 2 times daily 12/3/21   Historical Provider, MD   cetirizine (ZYRTEC) 10 MG tablet Take 1 tablet by mouth as needed 12/3/21   Historical Provider, MD   tamoxifen (NOLVADEX) 10 MG tablet  7/20/22   Historical Provider, MD   testosterone cypionate (DEPOTESTOTERONE CYPIONATE) 200 MG/ML injection Inject 1 mL into the muscle once a week. 1/21/22   Historical Provider, MD   pantoprazole (PROTONIX) 40 MG tablet Take 1 tablet by mouth daily 1/14/22   Historical Provider, MD   famotidine (PEPCID) 40 MG tablet Take 1 tablet by mouth nightly 2/3/22   Historical Provider, MD   meclizine (ANTIVERT) 25 MG tablet Take 1 tablet by mouth as needed 12/17/21   Historical Provider, MD   albuterol sulfate HFA (VENTOLIN HFA) 108 (90 Base) MCG/ACT inhaler Inhale 2 puffs into the lungs every 6 hours as needed for Wheezing 10/26/18   ELIA Russell       Past Medical History:   Diagnosis Date    Fibromyalgia     Hiatal hernia     Low testosterone     Tourette's        Past Surgical History:   Procedure Laterality Date    KNEE ARTHROSCOPY      MASTECTOMY, BILATERAL      SHOULDER ARTHROSCOPY      THROAT SURGERY      TONSILLECTOMY AND ADENOIDECTOMY         No family history on file.       Psychiatric Review of Systems    Mood Depression:  positive

## 2023-07-14 DIAGNOSIS — G47.00 INSOMNIA, UNSPECIFIED TYPE: ICD-10-CM

## 2023-07-14 DIAGNOSIS — F43.12 NIGHTMARES ASSOCIATED WITH CHRONIC POST-TRAUMATIC STRESS DISORDER: Primary | ICD-10-CM

## 2023-07-14 DIAGNOSIS — F51.5 NIGHTMARES ASSOCIATED WITH CHRONIC POST-TRAUMATIC STRESS DISORDER: Primary | ICD-10-CM

## 2023-07-14 DIAGNOSIS — F43.12 NIGHTMARES ASSOCIATED WITH CHRONIC POST-TRAUMATIC STRESS DISORDER: ICD-10-CM

## 2023-07-14 DIAGNOSIS — F51.5 NIGHTMARES ASSOCIATED WITH CHRONIC POST-TRAUMATIC STRESS DISORDER: ICD-10-CM

## 2023-07-14 RX ORDER — PRAZOSIN HYDROCHLORIDE 1 MG/1
CAPSULE ORAL
Qty: 90 CAPSULE | OUTPATIENT
Start: 2023-07-14

## 2023-07-14 RX ORDER — DOXEPIN HYDROCHLORIDE 10 MG/1
10 CAPSULE ORAL NIGHTLY
Qty: 30 CAPSULE | Refills: 3 | Status: SHIPPED | OUTPATIENT
Start: 2023-07-14

## 2023-07-14 RX ORDER — PRAZOSIN HYDROCHLORIDE 1 MG/1
1 CAPSULE ORAL NIGHTLY
Qty: 30 CAPSULE | Refills: 0 | Status: SHIPPED | OUTPATIENT
Start: 2023-07-14

## 2023-07-14 NOTE — PROGRESS NOTES
Took trazodone last night, side effects, \"felt drunk\", couldn't sleep. Discontinue trazodone due to side effects, will trail doxepin for sleep. Continue prazosin and encouraged patient to take hydroxyzine as needed for anxiety as patient reports his anxiety level is high and he has not yet started the medication.     Electronically signed by ELIA Ly CNP on 7/14/2023 at 2:36 PM

## 2023-07-19 ENCOUNTER — OFFICE VISIT (OUTPATIENT)
Dept: NEUROLOGY | Age: 58
End: 2023-07-19
Payer: MEDICAID

## 2023-07-19 VITALS
OXYGEN SATURATION: 98 % | BODY MASS INDEX: 25.9 KG/M2 | WEIGHT: 165 LBS | SYSTOLIC BLOOD PRESSURE: 145 MMHG | HEIGHT: 67 IN | DIASTOLIC BLOOD PRESSURE: 97 MMHG | HEART RATE: 111 BPM

## 2023-07-19 DIAGNOSIS — M54.2 CERVICAL PAIN: ICD-10-CM

## 2023-07-19 DIAGNOSIS — R26.89 ANTALGIC GAIT: ICD-10-CM

## 2023-07-19 DIAGNOSIS — R93.7 ABNORMAL X-RAY OF CERVICAL SPINE: ICD-10-CM

## 2023-07-19 DIAGNOSIS — M54.50 LUMBAR PAIN: ICD-10-CM

## 2023-07-19 DIAGNOSIS — M79.601 PAIN IN BOTH UPPER EXTREMITIES: ICD-10-CM

## 2023-07-19 DIAGNOSIS — M79.602 PAIN IN BOTH UPPER EXTREMITIES: ICD-10-CM

## 2023-07-19 DIAGNOSIS — G43.109 MIGRAINE WITH VISUAL AURA: Primary | ICD-10-CM

## 2023-07-19 DIAGNOSIS — R29.898 WEAKNESS OF HAND: ICD-10-CM

## 2023-07-19 DIAGNOSIS — R20.2 LEG PARESTHESIA: ICD-10-CM

## 2023-07-19 PROCEDURE — 99214 OFFICE O/P EST MOD 30 MIN: CPT | Performed by: NURSE PRACTITIONER

## 2023-07-19 RX ORDER — TIZANIDINE 4 MG/1
4 TABLET ORAL NIGHTLY
Qty: 30 TABLET | Refills: 5 | Status: SHIPPED | OUTPATIENT
Start: 2023-07-19

## 2023-07-19 RX ORDER — UBROGEPANT 100 MG/1
TABLET ORAL
Qty: 10 TABLET | Refills: 3 | Status: SHIPPED | OUTPATIENT
Start: 2023-07-19

## 2023-07-19 NOTE — PROGRESS NOTES
REVIEW OF SYSTEMS    Constitutional: []Fever []Sweat []Chills [] Recent Injury [x] Denies all unless marked  HEENT:[x]Headache  [] Head Injury/Hearing Loss  [] Sore Throat  [] Ear Ache/Dizziness  [x] Denies all unless marked  Spine:  [x] Neck pain  [x] Back pain  [] Sciaticia  [x] Denies all unless marked  Cardiovascular:[]Heart Disease []Chest Pain [] Palpitations  [x] Denies all unless marked  Pulmonary: []Shortness of Breath []Cough   [x] Denies all unless marke  Gastrointestinal: []Nausea  []Vomiting  []Abdominal Pain  []Constipation  []Diarrhea  []Dark Bloody Stools  [x] Denies all unless marked  Psychiatric/Behavioral:[x] Depression [x] Anxiety [x] Denies all unless marked  Genitourinary:   [] Frequency  [] Urgency  [] Incontinence [] Pain with Urination  [x] Denies all unless marked  Extremities: [x]Pain  []Swelling  [x] Denies all unless marked  Musculoskeletal: [] Muscle Pain  [] Joint Pain  [] Arthritis [] Muscle Cramps [] Muscle Twitches  [x] Denies all unless marked  Sleep: [x] Insomnia [] Snoring [] Restless Legs [] Sleep Apnea  [] Daytime Sleepiness  [x] Denies all unless marked  Skin:[] Rash [] Skin Discoloration [x] Denies all unless marked   Neurological: [x]Visual Disturbance/Memory Loss [] Loss of Balance [] Slurred Speech/Weakness [] Seizures  [x] Vertigo/Dizziness [x] Denies all unless marked

## 2023-07-19 NOTE — PROGRESS NOTES
[facility] NEUROLOGY:    Patient: Julio Trejo   :  1965  Age:  61 y.o. MRN:  [id number]  Today:  22    Provider: ELIA Watson    Chief Complaint:  Chief Complaint   Patient presents with    Follow-up    Migraine         HISTORY OF PRESENT ILLNESS:   Julio Trejo is a 61y.o. year old male follow up for migraines. He states they have been better. No changes to headache characteristics. He states while trying to complete his MRI cervical spine he had panic attack and has been having nightly panic attacks since then. He is now seeing a talk therapist and a panic specialist along with a psychiatric nurse practitioner. He has been started on Risperdal, Hydroxyzine, is doing better overall. He states he is having issues with ongoing lumbar pain, he has known herniated discs and bone spurs. States he is having more issues walking. Is worried he has injured her back from prior fall. He has prior history of bone spurs throughout spine. He has continual bilateral lower extremity discomfort, paresthesias/feeling of bugs crawling on skin. Worse when being still or at night. Prior history of CRAO per Dr. Myron Nova 22. Has grey spot to vision. Right inner nasal corner of right eye has visual disturbance and altered sight. Has seen Dr. Pamela Mccarthy at , felt he has cervical issues causing arm pains. He has bilateral constant arm pain, pain is to biceps/deltoid area. Pain feels like ice to skin. He feels like this got worse around December. Intermittent weakness to hands. Headaches largely unchanged, Ubrelvy working well for him. No clear changes to characteristics of headaches. Rosia Colleen still occur in clusters. Patient relates that when optic migraine events occur he has flashing lights in his peripheral fields and his visual fields become narrowed. States it feels like he is looking through a tube. Light starts white then into rainbow colors, only in periphery. There is sometimes associated pain.

## 2023-07-20 DIAGNOSIS — F40.240 CLAUSTROPHOBIA: Primary | ICD-10-CM

## 2023-07-20 RX ORDER — DIAZEPAM 5 MG/1
TABLET ORAL
Qty: 2 TABLET | Refills: 0 | Status: SHIPPED | OUTPATIENT
Start: 2023-07-20 | End: 2023-08-19

## 2023-07-20 NOTE — TELEPHONE ENCOUNTER
Requested Prescriptions     Pending Prescriptions Disp Refills    diazePAM (VALIUM) 5 MG tablet 2 tablet 0     Sig: Take one tablet one hour before MRI. May repeat dose immediately before test if needed.        Last Office Visit:  7/19/2023  Next Office Visit:  12/19/2023  Last Medication Refill:  N/A  Chris Amato up to date:  07/20/2023    *RX updated to reflect   07/20/2023  fill date*

## 2023-07-21 ENCOUNTER — CLINICAL DOCUMENTATION (OUTPATIENT)
Dept: NEUROLOGY | Age: 58
End: 2023-07-21

## 2023-07-21 NOTE — PROGRESS NOTES
Gennaro Carter - PA Case ID: [id number] - Rx #: [id number] help?  Call us at [phone]  Outcome  Approvedtoday  PA Case: [id number], Status: Approved, Coverage Starts on: 4/21/2023 12:00:00 AM, Coverage Ends on: 7/20/2024 12:00:00 AM.  Drug  Ubrelvy 100MG tablets  Form  [employer] Electronic PA Form (2031 NCPDP)  Mavis Ulloa  [phone]

## 2023-08-01 ENCOUNTER — TELEPHONE (OUTPATIENT)
Dept: PSYCHIATRY | Age: 58
End: 2023-08-01

## 2023-08-01 NOTE — TELEPHONE ENCOUNTER
Called and confirmed appt with pt for 8/2 @ 1 PM    Electronically signed by Rolf Lizama on 8/1/2023 at 12:01 PM

## 2023-08-02 ENCOUNTER — SCHEDULED TELEPHONE ENCOUNTER (OUTPATIENT)
Dept: PSYCHIATRY | Age: 58
End: 2023-08-02
Payer: MEDICAID

## 2023-08-02 DIAGNOSIS — F60.9 PERSONALITY DISORDER, UNSPECIFIED (HCC): ICD-10-CM

## 2023-08-02 DIAGNOSIS — G47.00 INSOMNIA, UNSPECIFIED TYPE: ICD-10-CM

## 2023-08-02 DIAGNOSIS — F51.5 NIGHTMARES ASSOCIATED WITH CHRONIC POST-TRAUMATIC STRESS DISORDER: ICD-10-CM

## 2023-08-02 DIAGNOSIS — F39 UNSPECIFIED MOOD (AFFECTIVE) DISORDER (HCC): Primary | ICD-10-CM

## 2023-08-02 DIAGNOSIS — F41.9 ANXIETY DISORDER, UNSPECIFIED TYPE: ICD-10-CM

## 2023-08-02 DIAGNOSIS — F43.12 NIGHTMARES ASSOCIATED WITH CHRONIC POST-TRAUMATIC STRESS DISORDER: ICD-10-CM

## 2023-08-02 DIAGNOSIS — F95.9 TIC DISORDER: ICD-10-CM

## 2023-08-02 PROCEDURE — 99443 PR PHYS/QHP TELEPHONE EVALUATION 21-30 MIN: CPT

## 2023-08-02 RX ORDER — PRAZOSIN HYDROCHLORIDE 1 MG/1
1 CAPSULE ORAL NIGHTLY
Qty: 30 CAPSULE | Refills: 0 | Status: SHIPPED | OUTPATIENT
Start: 2023-08-02

## 2023-08-02 RX ORDER — DOXEPIN HYDROCHLORIDE 25 MG/1
25 CAPSULE ORAL NIGHTLY
Qty: 30 CAPSULE | Refills: 0 | Status: SHIPPED | OUTPATIENT
Start: 2023-08-02

## 2023-08-02 NOTE — PROGRESS NOTES
Anny Olivia is a 60 y.o. male evaluated via telephone on 8/2/2023 for Medication Check  . Documentation:  I communicated with the patient and/or health care decision maker about see below. Details of this discussion including any medical advice provided: see below    Total Time: minutes: 21-30 minutes    Anny Olivia was evaluated through a synchronous (real-time) audio encounter. Patient identification was verified at the start of the visit. He (or guardian if applicable) is aware that this is a billable service, which includes applicable co-pays. This visit was conducted with the patient's (and/or legal guardian's) verbal consent. He has not had a related appointment within my department in the past 7 days or scheduled within the next 24 hours. The patient was located at Home: [address] [facility]. The provider was located at [facility] ([address] [place] [address]): [address] [facility]. [facility],  [address] [place] [address]. Note: not billable if this call serves to triage the patient into an appointment for the relevant concern    8/2/23        Progress Note    Elicia Hatfield 1965      Chief Complaint   Patient presents with    Medication Check         Subjective:    Patient is a 60 y.o. male diagnosed with mood disorder, anxiety, personality disorder, nightmares,  and presents today for follow-up. Last seen in clinic on 7/12/2023  and prior records were reviewed. Last visit: \" I was told I had to come here for medication management, I've already been setup for therapy at [facility]". Patient seen in office today, wearing casual clothing, appropriate for season. He is alert and oriented to person, place, time, and situation. Patient is female transgendered to male. Began hormone therapy in 2002, bilateral mastectomy in 2003.   Patient reports history of schizoaffective disorder, bipolar disorder, depression, anxiety, ADHD, Tourette's, borderline personality disorder, and

## 2023-08-04 ENCOUNTER — TELEPHONE (OUTPATIENT)
Dept: PSYCHIATRY | Age: 58
End: 2023-08-04

## 2023-08-04 RX ORDER — DESVENLAFAXINE 25 MG/1
25 TABLET, EXTENDED RELEASE ORAL DAILY
Qty: 30 TABLET | Refills: 0 | Status: SHIPPED | OUTPATIENT
Start: 2023-08-04

## 2023-08-04 NOTE — TELEPHONE ENCOUNTER
----- Message from Norma Canales NP sent at 3/12/2019  9:31 AM CDT -----  Results are normal; please call patient. Thank you.    Pt made aware and has no questions UC       Contacted pt as requested by Clinton Dickerson him that she received his labs from [facility] [place] [place] and that his CKD is stable but she does not feel comfortable prescribing the Cymbalta. He was informed that she sent in a RX for Pristiq (Desvenlafaxine) an anti-depressant to be taken daily that also helps with anxiety and panic disorder. Pt agreeable to take the Pristiq. He verbalized understanding of the above info and plans to follow. Pt encouraged to call back as needed with any questions or concerns.

## 2023-08-09 ENCOUNTER — HOSPITAL ENCOUNTER (OUTPATIENT)
Dept: SLEEP CENTER | Age: 58
Discharge: HOME | End: 2023-08-11
Payer: MEDICAID

## 2023-08-09 PROCEDURE — G0399 HOME SLEEP TEST/TYPE 3 PORTA: HCPCS

## 2023-08-09 NOTE — PROGRESS NOTES
presented today in the sleep center for a Home Sleep Test (HST). Ms. Inna He was instructed on the device and was requested to wear the unit for two nights.  was asked to have the HST monitor back by 3PM on 08/11/2023. The patient acknowledged they understood.

## 2023-08-10 PROCEDURE — G0399 HOME SLEEP TEST/TYPE 3 PORTA: HCPCS

## 2023-08-21 ENCOUNTER — OFFICE VISIT (OUTPATIENT)
Dept: GASTROENTEROLOGY | Facility: CLINIC | Age: 58
End: 2023-08-21
Payer: MEDICARE

## 2023-08-21 VITALS
HEIGHT: 67 IN | BODY MASS INDEX: 28.31 KG/M2 | WEIGHT: 180.4 LBS | TEMPERATURE: 95 F | HEART RATE: 96 BPM | SYSTOLIC BLOOD PRESSURE: 134 MMHG | DIASTOLIC BLOOD PRESSURE: 96 MMHG | OXYGEN SATURATION: 96 %

## 2023-08-21 DIAGNOSIS — Z12.11 COLON CANCER SCREENING: Primary | ICD-10-CM

## 2023-08-21 DIAGNOSIS — K21.9 GASTROESOPHAGEAL REFLUX DISEASE, UNSPECIFIED WHETHER ESOPHAGITIS PRESENT: ICD-10-CM

## 2023-08-21 PROCEDURE — 99214 OFFICE O/P EST MOD 30 MIN: CPT | Performed by: CLINICAL NURSE SPECIALIST

## 2023-08-21 PROCEDURE — 1160F RVW MEDS BY RX/DR IN RCRD: CPT | Performed by: CLINICAL NURSE SPECIALIST

## 2023-08-21 PROCEDURE — 1159F MED LIST DOCD IN RCRD: CPT | Performed by: CLINICAL NURSE SPECIALIST

## 2023-08-21 RX ORDER — UBROGEPANT 100 MG/1
100 TABLET ORAL AS NEEDED
COMMUNITY
Start: 2023-07-22

## 2023-08-21 RX ORDER — PRAZOSIN HYDROCHLORIDE 1 MG/1
1 CAPSULE ORAL NIGHTLY
COMMUNITY
Start: 2023-08-12

## 2023-08-21 RX ORDER — DESVENLAFAXINE 25 MG/1
25 TABLET, EXTENDED RELEASE ORAL DAILY
COMMUNITY
Start: 2023-08-04

## 2023-08-21 RX ORDER — ROSUVASTATIN CALCIUM 10 MG/1
10 TABLET, COATED ORAL DAILY
COMMUNITY
Start: 2023-07-23

## 2023-08-21 RX ORDER — DOXEPIN HYDROCHLORIDE 25 MG/1
25 CAPSULE ORAL NIGHTLY
COMMUNITY
Start: 2023-08-02

## 2023-08-21 RX ORDER — TIZANIDINE 4 MG/1
4 TABLET ORAL NIGHTLY
COMMUNITY
Start: 2023-08-18

## 2023-08-21 RX ORDER — RISPERIDONE 0.5 MG/1
1 TABLET ORAL 2 TIMES DAILY
COMMUNITY
Start: 2023-07-28

## 2023-08-21 NOTE — PROGRESS NOTES
"Sean Garcia  1965 8/21/2023  Chief Complaint   Patient presents with    Colon Cancer Screening     Pt presents today for evaluation for colonoscopy-was never able to schedule last year due to issues with a ride    Heartburn     Pt also presents today for evaluation for reflux-states he is still having issues and at times when he has had panic attacks he will wake up in the middle of the night \"throwing up acid\"      Subjective   HPI  Sean Garcia is a 58 y.o. male who presents with a complaint of acid reflux hot acid coming up in his mouth especially at night He says it is a burning moderate to severe at times awakening him at night. No nausea or vomiting. No dysphagia. No wt loss. No fever chills or sweats. He is on Protonix and this works fair for him with Zantac at night.   No melena.   No BRBPR.    He has never had a colonoscopy. No family hx for colon cancer. No change in bowels. No diarrhea or constipation.     Past Medical History:   Diagnosis Date    ADD (attention deficit disorder) without hyperactivity     Anxiety     Depression     Facet syndrome     GERD (gastroesophageal reflux disease)     Hyperlipidemia     Tourette's syndrome      Past Surgical History:   Procedure Laterality Date    KNEE ARTHROSCOPY Left     MASTECTOMY RADICAL Bilateral     NASAL SINUS SURGERY      Burned excess tissue from nasal passages    SHOULDER SURGERY Right     TONSILLECTOMY AND ADENOIDECTOMY         Outpatient Medications Marked as Taking for the 8/21/23 encounter (Office Visit) with Ira Wetzel APRN   Medication Sig Dispense Refill    Advair Diskus 250-50 MCG/DOSE DISKUS Inhale 1 puff 2 (Two) Times a Day.      albuterol (PROVENTIL) (2.5 MG/3ML) 0.083% nebulizer solution Take  by nebulization As Needed.      cetirizine (zyrTEC) 10 MG tablet Take 1 tablet by mouth As Needed.      Desvenlafaxine Succinate ER 25 MG tablet sustained-release 24 hour Take 1 tablet by mouth Daily.      doxepin (SINEquan) " 25 MG capsule Take 1 capsule by mouth Every Night.      famotidine (PEPCID) 40 MG tablet Take 1 tablet by mouth 2 (Two) Times a Day.      meclizine (ANTIVERT) 25 MG tablet Take 1 tablet by mouth As Needed.      naproxen (NAPROSYN) 500 MG tablet Take 1 tablet by mouth As Needed.      pantoprazole (PROTONIX) 40 MG EC tablet Take 1 tablet by mouth 2 (Two) Times a Day.      prazosin (MINIPRESS) 1 MG capsule 1 capsule Every Night.      ProAir  (90 Base) MCG/ACT inhaler Inhale 2 puffs As Needed.      risperiDONE (risperDAL) 0.5 MG tablet Take 2 tablets by mouth 2 (Two) Times a Day.      rosuvastatin (CRESTOR) 10 MG tablet Take 1 tablet by mouth Daily.      Testosterone Cypionate (DEPOTESTOTERONE CYPIONATE) 200 MG/ML injection Inject 1 mL into the appropriate muscle as directed by prescriber 1 (One) Time Per Week.      tiZANidine (ZANAFLEX) 4 MG tablet Take 1 tablet by mouth Every Night.      Ubrelvy 100 MG tablet Take 1 tablet by mouth As Needed.      vitamin D (ERGOCALCIFEROL) 1.25 MG (06316 UT) capsule capsule       [DISCONTINUED] PEG-KCl-NaCl-NaSulf-Na Asc-C (MoviPrep) 100 g reconstituted solution powder Take 1,000 mL by mouth Every 12 (Twelve) Hours. 1 each 0     Allergies   Allergen Reactions    Cyclobenzaprine Hives    Codeine Other (See Comments)     Unknown     Cortisporin [Bacitra-Neomycin-Polymyxin-Hc] Other (See Comments)     Unknown     Gabapentin Other (See Comments)     Unknown     Lithium Other (See Comments)     Unknown     Lyrica [Pregabalin] Other (See Comments)     Unknown     Paxil [Paroxetine] Other (See Comments)     Unknown     Soma [Carisoprodol] Other (See Comments)     Unknown     Tetracyclines & Related Other (See Comments)     Unknown     Methocarbamol Other (See Comments)     nightmares     Social History     Socioeconomic History    Marital status: Single   Tobacco Use    Smoking status: Former    Smokeless tobacco: Never   Vaping Use    Vaping Use: Former   Substance and Sexual  Activity    Alcohol use: Not Currently    Drug use: Never    Sexual activity: Defer     Family History   Problem Relation Age of Onset    Colon cancer Neg Hx     Colon polyps Neg Hx     Esophageal cancer Neg Hx     Liver cancer Neg Hx     Liver disease Neg Hx     Rectal cancer Neg Hx     Stomach cancer Neg Hx      Health Maintenance   Topic Date Due    COLORECTAL CANCER SCREENING  Never done    COVID-19 Vaccine (1) Never done    TDAP/TD VACCINES (1 - Tdap) Never done    ZOSTER VACCINE (1 of 2) Never done    HEPATITIS C SCREENING  Never done    ANNUAL WELLNESS VISIT  Never done    LIPID PANEL  Never done    INFLUENZA VACCINE  10/01/2023    Pneumococcal Vaccine 0-64  Aged Out     Review of Systems   Constitutional:  Negative for activity change, appetite change, chills, diaphoresis, fatigue, fever and unexpected weight change.   HENT:  Negative for ear pain, hearing loss, mouth sores, sore throat, trouble swallowing and voice change.    Eyes: Negative.    Respiratory:  Negative for cough, choking, shortness of breath and wheezing.    Cardiovascular:  Negative for chest pain and palpitations.   Gastrointestinal:  Negative for abdominal pain, blood in stool, constipation, diarrhea, nausea and vomiting.        Acid reflux   Endocrine: Negative for cold intolerance and heat intolerance.   Genitourinary:  Negative for decreased urine volume, dysuria, frequency, hematuria and urgency.   Musculoskeletal:  Negative for back pain, gait problem and myalgias.   Skin:  Negative for color change, pallor and rash.   Allergic/Immunologic: Negative for food allergies and immunocompromised state.   Neurological:  Negative for dizziness, tremors, seizures, syncope, weakness, light-headedness, numbness and headaches.   Hematological:  Negative for adenopathy. Does not bruise/bleed easily.   Psychiatric/Behavioral:  Negative for agitation and confusion. The patient is not nervous/anxious.    All other systems reviewed and are  "negative.  Objective   Vitals:    08/21/23 1016   BP: 134/96   BP Location: Left arm   Patient Position: Sitting   Cuff Size: Adult   Pulse: 96   Temp: 95 øF (35 øC)   TempSrc: Infrared   SpO2: 96%   Weight: 81.8 kg (180 lb 6.4 oz)   Height: 170.2 cm (67\")     Body mass index is 28.25 kg/mý.  Physical Exam  Constitutional:       Appearance: He is well-developed.   HENT:      Head: Normocephalic and atraumatic.   Eyes:      Pupils: Pupils are equal, round, and reactive to light.   Neck:      Trachea: No tracheal deviation.   Cardiovascular:      Rate and Rhythm: Normal rate and regular rhythm.      Heart sounds: Normal heart sounds. No murmur heard.    No friction rub. No gallop.   Pulmonary:      Effort: Pulmonary effort is normal. No respiratory distress.      Breath sounds: Normal breath sounds. No wheezing or rales.   Chest:      Chest wall: No tenderness.   Abdominal:      General: Bowel sounds are normal. There is no distension.      Palpations: Abdomen is soft. Abdomen is not rigid.      Tenderness: There is no abdominal tenderness. There is no guarding or rebound.   Musculoskeletal:         General: No tenderness or deformity. Normal range of motion.      Cervical back: Normal range of motion and neck supple.   Skin:     General: Skin is warm and dry.      Coloration: Skin is not pale.      Findings: No rash.   Neurological:      Mental Status: He is alert and oriented to person, place, and time.      Deep Tendon Reflexes: Reflexes are normal and symmetric.   Psychiatric:         Behavior: Behavior normal.         Thought Content: Thought content normal.         Judgment: Judgment normal.     Assessment & Plan   Diagnoses and all orders for this visit:    1. Colon cancer screening (Primary)  -     Case Request; Standing  -     Case Request  -     polyethylene glycol (GoLYTELY) 236 g solution; Take as directed by office instructions.  Dispense: 4000 mL; Refill: 0    2. Gastroesophageal reflux disease, " unspecified whether esophagitis present    Other orders  -     Implement Anesthesia Orders Day of Procedure; Standing  -     Obtain Informed Consent; Standing  -     Follow Anesthesia Guidelines / Protocol; Future  -     Obtain Informed Consent; Future  -     Verify Bowel Prep Was Successful; Standing    I discussed non pharmaceutical treatment of gerd.  This includes gradually losing weight to achieve ideal body wt., elevation of the head of bed by 4-6 inches, nothing to eat or drink 3 hours prior to lying down, avoiding tight clothing, stress reduction, tobacco cessation, reduction of alcohol intake, and dietary restrictions (avoiding caffeine, coffee, fatty foods, mints, chocolate, spicy foods and tomato based sauces as much as possible).    Continue Protonix and Zantac at night for breakthrough. Gaviscon suggested as well.     ESOPHAGOGASTRODUODENOSCOPY WITH ANESTHESIA (N/A), COLONOSCOPY WITH ANESTHESIA (N/A)  Part of this note may be an electronic transcription/translation of spoken language to printed text using the Dragon Dictation System.  Body mass index is 28.25 kg/mý.  No follow-ups on file.    BMI is >= 25 and <30. (Overweight) The following options were offered after discussion;: weight loss educational material (shared in after visit summary)      All risks, benefits, alternatives, and indications of colonoscopy and/or Endoscopy procedure have been discussed with the patient. Risks to include perforation of the colon requiring possible surgery or colostomy, risk of bleeding from biopsies or removal of colon tissue, possibility of missing a colon polyp or cancer, or adverse drug reaction.  Benefits to include the diagnosis and management of disease of the colon and rectum. Alternatives to include barium enema, radiographic evaluation, lab testing or no intervention. Pt verbalizes understanding and agrees.     Ira Wetzel, APRN  8/21/2023  10:55 CDT          If you smoke or use tobacco, 4 minutes  reading provided  Steps to Quit Smoking  Smoking tobacco can be harmful to your health and can affect almost every organ in your body. Smoking puts you, and those around you, at risk for developing many serious chronic diseases. Quitting smoking is difficult, but it is one of the best things that you can do for your health. It is never too late to quit.  What are the benefits of quitting smoking?  When you quit smoking, you lower your risk of developing serious diseases and conditions, such as:  Lung cancer or lung disease, such as COPD.  Heart disease.  Stroke.  Heart attack.  Infertility.  Osteoporosis and bone fractures.  Additionally, symptoms such as coughing, wheezing, and shortness of breath may get better when you quit. You may also find that you get sick less often because your body is stronger at fighting off colds and infections. If you are pregnant, quitting smoking can help to reduce your chances of having a baby of low birth weight.  How do I get ready to quit?  When you decide to quit smoking, create a plan to make sure that you are successful. Before you quit:  Pick a date to quit. Set a date within the next two weeks to give you time to prepare.  Write down the reasons why you are quitting. Keep this list in places where you will see it often, such as on your bathroom mirror or in your car or wallet.  Identify the people, places, things, and activities that make you want to smoke (triggers) and avoid them. Make sure to take these actions:  Throw away all cigarettes at home, at work, and in your car.  Throw away smoking accessories, such as ashtrays and lighters.  Clean your car and make sure to empty the ashtray.  Clean your home, including curtains and carpets.  Tell your family, friends, and coworkers that you are quitting. Support from your loved ones can make quitting easier.  Talk with your health care provider about your options for quitting smoking.  Find out what treatment options are  "covered by your health insurance.  What strategies can I use to quit smoking?  Talk with your healthcare provider about different strategies to quit smoking. Some strategies include:  Quitting smoking altogether instead of gradually lessening how much you smoke over a period of time. Research shows that quitting "cold turkey" is more successful than gradually quitting.  Attending in-person counseling to help you build problem-solving skills. You are more likely to have success in quitting if you attend several counseling sessions. Even short sessions of 10 minutes can be effective.  Finding resources and support systems that can help you to quit smoking and remain smoke-free after you quit. These resources are most helpful when you use them often. They can include:  Online chats with a counselor.  Telephone quitlines.  Printed self-help materials.  Support groups or group counseling.  Text messaging programs.  Mobile phone applications.  Taking medicines to help you quit smoking. (If you are pregnant or breastfeeding, talk with your health care provider first.) Some medicines contain nicotine and some do not. Both types of medicines help with cravings, but the medicines that include nicotine help to relieve withdrawal symptoms. Your health care provider may recommend:  Nicotine patches, gum, or lozenges.  Nicotine inhalers or sprays.  Non-nicotine medicine that is taken by mouth.  Talk with your health care provider about combining strategies, such as taking medicines while you are also receiving in-person counseling. Using these two strategies together makes you more likely to succeed in quitting than if you used either strategy on its own.  If you are pregnant or breastfeeding, talk with your health care provider about finding counseling or other support strategies to quit smoking. Do not take medicine to help you quit smoking unless told to do so by your health care provider.  What things can I do to make it " easier to quit?  Quitting smoking might feel overwhelming at first, but there is a lot that you can do to make it easier. Take these important actions:  Reach out to your family and friends and ask that they support and encourage you during this time. Call telephone quitlines, reach out to support groups, or work with a counselor for support.  Ask people who smoke to avoid smoking around you.  Avoid places that trigger you to smoke, such as bars, parties, or smoke-break areas at work.  Spend time around people who do not smoke.  Lessen stress in your life, because stress can be a smoking trigger for some people. To lessen stress, try:  Exercising regularly.  Deep-breathing exercises.  Yoga.  Meditating.  Performing a body scan. This involves closing your eyes, scanning your body from head to toe, and noticing which parts of your body are particularly tense. Purposefully relax the muscles in those areas.  Download or purchase mobile phone or tablet apps (applications) that can help you stick to your quit plan by providing reminders, tips, and encouragement. There are many free apps, such as QuitGuide from the CDC (Centers for Disease Control and Prevention). You can find other support for quitting smoking (smoking cessation) through smokefree.gov and other websites.  How will I feel when I quit smoking?  Within the first 24 hours of quitting smoking, you may start to feel some withdrawal symptoms. These symptoms are usually most noticeable 2-3 days after quitting, but they usually do not last beyond 2-3 weeks. Changes or symptoms that you might experience include:  Mood swings.  Restlessness, anxiety, or irritation.  Difficulty concentrating.  Dizziness.  Strong cravings for sugary foods in addition to nicotine.  Mild weight gain.  Constipation.  Nausea.  Coughing or a sore throat.  Changes in how your medicines work in your body.  A depressed mood.  Difficulty sleeping (insomnia).  After the first 2-3 weeks of  quitting, you may start to notice more positive results, such as:  Improved sense of smell and taste.  Decreased coughing and sore throat.  Slower heart rate.  Lower blood pressure.  Clearer skin.  The ability to breathe more easily.  Fewer sick days.  Quitting smoking is very challenging for most people. Do not get discouraged if you are not successful the first time. Some people need to make many attempts to quit before they achieve long-term success. Do your best to stick to your quit plan, and talk with your health care provider if you have any questions or concerns.  This information is not intended to replace advice given to you by your health care provider. Make sure you discuss any questions you have with your health care provider.  Document Released: 12/12/2002 Document Revised: 08/15/2017 Document Reviewed: 05/03/2016  "[employer] Interactive Patient Education c 2017 "[employer] Inc.

## 2023-08-22 ENCOUNTER — TELEPHONE (OUTPATIENT)
Dept: PSYCHIATRY | Age: 58
End: 2023-08-22

## 2023-08-22 NOTE — TELEPHONE ENCOUNTER
Called and confirmed appt with pt for 8/23 @ 1 PM    Electronically signed by Breana Acharya on 8/22/2023 at 11:52 AM

## 2023-08-23 ENCOUNTER — TELEPHONE (OUTPATIENT)
Dept: PSYCHIATRY | Age: 58
End: 2023-08-23

## 2023-08-23 ENCOUNTER — SCHEDULED TELEPHONE ENCOUNTER (OUTPATIENT)
Dept: PSYCHIATRY | Age: 58
End: 2023-08-23
Payer: MEDICAID

## 2023-08-23 DIAGNOSIS — G47.00 INSOMNIA, UNSPECIFIED TYPE: ICD-10-CM

## 2023-08-23 DIAGNOSIS — F43.12 NIGHTMARES ASSOCIATED WITH CHRONIC POST-TRAUMATIC STRESS DISORDER: ICD-10-CM

## 2023-08-23 DIAGNOSIS — F60.9 PERSONALITY DISORDER, UNSPECIFIED (HCC): ICD-10-CM

## 2023-08-23 DIAGNOSIS — F95.9 TIC DISORDER: ICD-10-CM

## 2023-08-23 DIAGNOSIS — F41.9 ANXIETY DISORDER, UNSPECIFIED TYPE: ICD-10-CM

## 2023-08-23 DIAGNOSIS — F51.5 NIGHTMARES ASSOCIATED WITH CHRONIC POST-TRAUMATIC STRESS DISORDER: ICD-10-CM

## 2023-08-23 DIAGNOSIS — F39 UNSPECIFIED MOOD (AFFECTIVE) DISORDER (HCC): Primary | ICD-10-CM

## 2023-08-23 PROCEDURE — 99443 PR PHYS/QHP TELEPHONE EVALUATION 21-30 MIN: CPT

## 2023-08-23 RX ORDER — DOXEPIN HYDROCHLORIDE 25 MG/1
25 CAPSULE ORAL NIGHTLY
Qty: 30 CAPSULE | Refills: 1 | Status: SHIPPED | OUTPATIENT
Start: 2023-08-23

## 2023-08-23 RX ORDER — DESVENLAFAXINE 25 MG/1
25 TABLET, EXTENDED RELEASE ORAL DAILY
Qty: 30 TABLET | Refills: 1 | Status: SHIPPED | OUTPATIENT
Start: 2023-08-23

## 2023-08-23 RX ORDER — PRAZOSIN HYDROCHLORIDE 1 MG/1
1 CAPSULE ORAL NIGHTLY
Qty: 30 CAPSULE | Refills: 1 | Status: SHIPPED | OUTPATIENT
Start: 2023-08-23

## 2023-08-23 ASSESSMENT — PATIENT HEALTH QUESTIONNAIRE - PHQ9
4. FEELING TIRED OR HAVING LITTLE ENERGY: 1
10. IF YOU CHECKED OFF ANY PROBLEMS, HOW DIFFICULT HAVE THESE PROBLEMS MADE IT FOR YOU TO DO YOUR WORK, TAKE CARE OF THINGS AT HOME, OR GET ALONG WITH OTHER PEOPLE: 1
6. FEELING BAD ABOUT YOURSELF - OR THAT YOU ARE A FAILURE OR HAVE LET YOURSELF OR YOUR FAMILY DOWN: 1
7. TROUBLE CONCENTRATING ON THINGS, SUCH AS READING THE NEWSPAPER OR WATCHING TELEVISION: 1
3. TROUBLE FALLING OR STAYING ASLEEP: 1
2. FEELING DOWN, DEPRESSED OR HOPELESS: 2
1. LITTLE INTEREST OR PLEASURE IN DOING THINGS: 2
SUM OF ALL RESPONSES TO PHQ QUESTIONS 1-9: 12
SUM OF ALL RESPONSES TO PHQ QUESTIONS 1-9: 12
9. THOUGHTS THAT YOU WOULD BE BETTER OFF DEAD, OR OF HURTING YOURSELF: 0
SUM OF ALL RESPONSES TO PHQ QUESTIONS 1-9: 12
SUM OF ALL RESPONSES TO PHQ9 QUESTIONS 1 & 2: 4
5. POOR APPETITE OR OVEREATING: 3
SUM OF ALL RESPONSES TO PHQ QUESTIONS 1-9: 12
8. MOVING OR SPEAKING SO SLOWLY THAT OTHER PEOPLE COULD HAVE NOTICED. OR THE OPPOSITE, BEING SO FIGETY OR RESTLESS THAT YOU HAVE BEEN MOVING AROUND A LOT MORE THAN USUAL: 1

## 2023-08-23 NOTE — PROGRESS NOTES
prazosin, but still having \"very vivid dreams, like about my past\". He reports he has been taking breathing treatments when he has these panic attacks, \"it seems they help me to calm down\". Discussed with patient breathing treatments of steroids, could possibly be hurting sleep, however patient reports \"no they have never messed with my sleep, I take a low-dose\". He reports that he does not feel the Risperdal has been helping, but when provider asked about his tics, he reports \"I yell those really have not bothered me, I guess they are getting better\". Patient feels hydroxyzine is ineffective, feels that he has \"extreme dry mouth\", also feels that sleep apnea could be a problem, knows that he snores, still having extreme daytime sleepiness. We discussed sleep hygiene, patient reports he just takes medication around 10 PM, but is staying up until 1:30-3:30 in the morning most nights before finally falling asleep. We will increase doxepin to help with sleep, encouraged use of melatonin, however patient reports \"that has never worked for me in the past\". Discussed a trial of Cymbalta, to help with depressive symptoms, anxiety, and chronic pain. However provider would like to review recent labs the patient had done with PCP before initiating, we will have staff get labs from [facility]. Informed patient staff will call him, after provider reviews labs to let them know which medication is going to the pharmacy. Patient denies any current or active suicidal or homicidal ideations, denies hallucinations, and no overt paranoia or delusions appreciated. He reports he has continued psychotherapy with therapist at [facility], reports he is also seeing a \"panic specialist via telehealth\". Today patient states, \"I have been feeling a bit better, I definitely feel like I am sleeping better and I am not having as many panic attacks, still having very strange dreams but no nightmares. \"    Patient interviewed via

## 2023-08-23 NOTE — TELEPHONE ENCOUNTER
Pt was called for virtual appointment check in.       Electronically signed by Roslyn Duval MA on 8/23/2023 at 12:46 PM

## 2023-09-05 DIAGNOSIS — F43.12 NIGHTMARES ASSOCIATED WITH CHRONIC POST-TRAUMATIC STRESS DISORDER: ICD-10-CM

## 2023-09-05 DIAGNOSIS — F51.5 NIGHTMARES ASSOCIATED WITH CHRONIC POST-TRAUMATIC STRESS DISORDER: ICD-10-CM

## 2023-09-05 RX ORDER — PRAZOSIN HYDROCHLORIDE 1 MG/1
CAPSULE ORAL
Qty: 90 CAPSULE | OUTPATIENT
Start: 2023-09-05

## 2023-09-06 ENCOUNTER — TELEPHONE (OUTPATIENT)
Dept: SLEEP CENTER | Age: 58
End: 2023-09-06

## 2023-09-06 DIAGNOSIS — G47.33 SLEEP APNEA, OBSTRUCTIVE: Primary | ICD-10-CM

## 2023-09-06 NOTE — PROGRESS NOTES
[facility]  [address]  Phone [phone] Fax [phone]     Patient Name: Bhavesh Wooten 2023  : 1965  Age: 60 y.o.   Patient Address: [address] [place] [address]       Patient Phone: [phone] (home)     REFERRAL  Referred to: DME provider of patient's choice  Bhavesh Wooten is referred for the following:    DME Equipment HPCPS Code Setting   Auto Adjusting CPAP device with flex or comparable pressure relief per comfort  8cm to 20cm   Heated Humidifier  Patient Choice       Replinishible PAP Supplies, 1 year supply  Item HPCPS Code Frequency   Mask of choice  or  1 per 3 months   Nasal Mask cushion/pillows  or  2 per 30 days   Full Face Mask Interface  1 per 30 days   Headgear  1 per 6 months   Tubing, length of choice  or  1 per 3 months   Water Chamber  1 per 6 months   Chinstrap  1 per 6 months   Disposable Filters  2 per 30 days   Reusable Filters  1 per 6 months     Diagnoses:  Obstructive sleep apnea (G47.33)  Length of Need: Lifetime, 99    Ordering Provider: Jeannene Son, M.D. Kathye Severs: [phone]         Signature:       Date: 2023   Electronically Signed by Janelle Rowley M.D.

## 2023-09-06 NOTE — TELEPHONE ENCOUNTER
Spoke to Mr. Hatfield. and went over the results of his HST performed 08/09/2023 and 08/10/2023. Questions answered.   Orders, documentation and insurance information were sent to [address] [place] [address] today

## 2023-09-22 ENCOUNTER — ANESTHESIA EVENT (OUTPATIENT)
Dept: GASTROENTEROLOGY | Facility: HOSPITAL | Age: 58
End: 2023-09-22
Payer: MEDICARE

## 2023-09-22 ENCOUNTER — HOSPITAL ENCOUNTER (OUTPATIENT)
Facility: HOSPITAL | Age: 58
Setting detail: HOSPITAL OUTPATIENT SURGERY
Discharge: HOME | End: 2023-09-22
Attending: INTERNAL MEDICINE | Admitting: INTERNAL MEDICINE
Payer: MEDICARE

## 2023-09-22 ENCOUNTER — TELEPHONE (OUTPATIENT)
Dept: GASTROENTEROLOGY | Facility: CLINIC | Age: 58
End: 2023-09-22
Payer: MEDICARE

## 2023-09-22 ENCOUNTER — ANESTHESIA (OUTPATIENT)
Dept: GASTROENTEROLOGY | Facility: HOSPITAL | Age: 58
End: 2023-09-22
Payer: MEDICARE

## 2023-09-22 VITALS
RESPIRATION RATE: 14 BRPM | TEMPERATURE: 88 F | OXYGEN SATURATION: 99 % | BODY MASS INDEX: 27.62 KG/M2 | DIASTOLIC BLOOD PRESSURE: 87 MMHG | HEART RATE: 83 BPM | WEIGHT: 176 LBS | HEIGHT: 67 IN | SYSTOLIC BLOOD PRESSURE: 129 MMHG

## 2023-09-22 PROCEDURE — C1726 CATH, BAL DIL, NON-VASCULAR: HCPCS | Performed by: INTERNAL MEDICINE

## 2023-09-22 PROCEDURE — 45378 DIAGNOSTIC COLONOSCOPY: CPT | Performed by: INTERNAL MEDICINE

## 2023-09-22 PROCEDURE — 25010000002 PROPOFOL 10 MG/ML EMULSION: Performed by: NURSE ANESTHETIST, CERTIFIED REGISTERED

## 2023-09-22 PROCEDURE — 43249 ESOPH EGD DILATION <30 MM: CPT | Performed by: INTERNAL MEDICINE

## 2023-09-22 RX ORDER — LIDOCAINE HYDROCHLORIDE 20 MG/ML
INJECTION, SOLUTION EPIDURAL; INFILTRATION; INTRACAUDAL; PERINEURAL AS NEEDED
Status: DISCONTINUED | OUTPATIENT
Start: 2023-09-22 | End: 2023-09-22 | Stop reason: SURG

## 2023-09-22 RX ORDER — SODIUM CHLORIDE 9 MG/ML
40 INJECTION, SOLUTION INTRAVENOUS AS NEEDED
Status: DISCONTINUED | OUTPATIENT
Start: 2023-09-22 | End: 2023-09-22 | Stop reason: HOSPADM

## 2023-09-22 RX ORDER — PROPOFOL 10 MG/ML
VIAL (ML) INTRAVENOUS AS NEEDED
Status: DISCONTINUED | OUTPATIENT
Start: 2023-09-22 | End: 2023-09-22 | Stop reason: SURG

## 2023-09-22 RX ORDER — SODIUM CHLORIDE 0.9 % (FLUSH) 0.9 %
10 SYRINGE (ML) INJECTION AS NEEDED
Status: DISCONTINUED | OUTPATIENT
Start: 2023-09-22 | End: 2023-09-22 | Stop reason: HOSPADM

## 2023-09-22 RX ORDER — SODIUM CHLORIDE 0.9 % (FLUSH) 0.9 %
10 SYRINGE (ML) INJECTION EVERY 12 HOURS SCHEDULED
Status: DISCONTINUED | OUTPATIENT
Start: 2023-09-22 | End: 2023-09-22 | Stop reason: HOSPADM

## 2023-09-22 RX ORDER — SODIUM CHLORIDE 9 MG/ML
100 INJECTION, SOLUTION INTRAVENOUS CONTINUOUS
Status: DISCONTINUED | OUTPATIENT
Start: 2023-09-22 | End: 2023-09-22 | Stop reason: HOSPADM

## 2023-09-22 RX ADMIN — LIDOCAINE HYDROCHLORIDE 160 MG: 20 INJECTION, SOLUTION EPIDURAL; INFILTRATION; INTRACAUDAL; PERINEURAL at 10:08

## 2023-09-22 RX ADMIN — SODIUM CHLORIDE 100 ML/HR: 9 INJECTION, SOLUTION INTRAVENOUS at 09:06

## 2023-09-22 RX ADMIN — PROPOFOL INJECTABLE EMULSION 390 MG: 10 INJECTION, EMULSION INTRAVENOUS at 10:08

## 2023-09-22 NOTE — ANESTHESIA PREPROCEDURE EVALUATION
Anesthesia Evaluation     Patient summary reviewed   no history of anesthetic complications:   NPO Solid Status: > 8 hours             Airway   Mallampati: II  Dental      Pulmonary    (+) a smoker Former, COPD, asthma,  Cardiovascular   Exercise tolerance: good (4-7 METS)    (+) hyperlipidemia  (-) cardiac stents, CABG      Neuro/Psych  (+) CVA residual symptoms, psychiatric history  GI/Hepatic/Renal/Endo    (+) GERD    Musculoskeletal     Abdominal    Substance History      OB/GYN          Other                        Anesthesia Plan    ASA 3     MAC     (Poor lung function; likely underlying vascular disease; poor health )  intravenous induction     Anesthetic plan, risks, benefits, and alternatives have been provided, discussed and informed consent has been obtained with: patient.      CODE STATUS:

## 2023-09-22 NOTE — ANESTHESIA POSTPROCEDURE EVALUATION
Patient: Sean CUELLO Oruaric    Procedure Summary       Date: 09/22/23 Room / Location: [facility] ENDOSCOPY 5 / [facility] ENDOSCOPY    Anesthesia Start: 1005 Anesthesia Stop: 1031    Procedures:       ESOPHAGOGASTRODUODENOSCOPY WITH ANESTHESIA      COLONOSCOPY WITH ANESTHESIA Diagnosis:       Colon cancer screening      (Colon cancer screening [Z12.11])    Surgeons: Isha Torre MD Provider: Walter Zaldivar CRNA    Anesthesia Type: MAC ASA Status: 3            Anesthesia Type: MAC    Vitals  No vitals data found for the desired time range.          Post Anesthesia Care and Evaluation    Patient location during evaluation: PACU  Patient participation: complete - patient participated  Level of consciousness: awake and alert  Pain score: 0  Pain management: adequate    Airway patency: patent  Anesthetic complications: No anesthetic complications    Cardiovascular status: acceptable  Respiratory status: acceptable  Hydration status: acceptable

## 2023-09-22 NOTE — H&P
Chief Complaint:   Heartburn, dysphagia and colon cancer screening    Subjective     HPI:   Complaint of acid reflux hot acid coming up in his mouth especially at night He says it is a burning moderate to severe at times awakening him at night. No nausea or vomiting. + dysphagia. No wt loss. No fever chills or sweats. He is on Protonix and this works fair for him with pepcid at night.      He has never had a colonoscopy. No family hx for colon cancer. No change in bowels. No diarrhea or constipation. No melena.  No BRBPR.     Past Medical History:   Past Medical History:   Diagnosis Date    ADD (attention deficit disorder) without hyperactivity     Anxiety     Asthma     COPD (chronic obstructive pulmonary disease)     Depression     Facet syndrome     GERD (gastroesophageal reflux disease)     Hiatal hernia     Hyperlipidemia     Hypertension     Sleep apnea     Stroke     stroke in right eye per pt.    Tourette's syndrome        Past Surgical History:  Past Surgical History:   Procedure Laterality Date    KNEE ARTHROSCOPY Left     MASTECTOMY RADICAL Bilateral     NASAL SINUS SURGERY      Burned excess tissue from nasal passages    SHOULDER SURGERY Right     TONSILLECTOMY AND ADENOIDECTOMY         Family History:  Family History   Problem Relation Age of Onset    Diabetes Mother     Heart disease Mother     Heart attack Mother     No Known Problems Father     Colon cancer Neg Hx     Colon polyps Neg Hx     Esophageal cancer Neg Hx     Liver cancer Neg Hx     Liver disease Neg Hx     Rectal cancer Neg Hx     Stomach cancer Neg Hx        Social History:   reports that he has quit smoking. His smoking use included cigarettes. He has never used smokeless tobacco. He reports that he does not currently use alcohol. He reports that he does not use drugs.    Medications:   Medications Prior to Admission   Medication Sig Dispense Refill Last Dose    Advair Diskus 250-50 MCG/DOSE DISKUS Inhale 1 puff 2 (Two) Times a Day.  "  9/21/2023    Desvenlafaxine Succinate ER 25 MG tablet sustained-release 24 hour Take 1 tablet by mouth Daily.   9/21/2023    doxepin (SINEquan) 25 MG capsule Take 1 capsule by mouth Every Night.   9/21/2023    famotidine (PEPCID) 40 MG tablet Take 1 tablet by mouth 2 (Two) Times a Day.   9/21/2023    naproxen (NAPROSYN) 500 MG tablet Take 1 tablet by mouth As Needed.   9/21/2023    pantoprazole (PROTONIX) 40 MG EC tablet Take 1 tablet by mouth 2 (Two) Times a Day.   9/21/2023    prazosin (MINIPRESS) 1 MG capsule 1 capsule Every Night.   9/21/2023    ProAir  (90 Base) MCG/ACT inhaler Inhale 2 puffs As Needed.   Past Week    risperiDONE (risperDAL) 0.5 MG tablet Take 2 tablets by mouth 2 (Two) Times a Day.   9/21/2023    rosuvastatin (CRESTOR) 10 MG tablet Take 1 tablet by mouth Daily.   9/21/2023    Testosterone Cypionate (DEPOTESTOTERONE CYPIONATE) 200 MG/ML injection Inject 1 mL into the appropriate muscle as directed by prescriber 1 (One) Time Per Week.   Past Week    tiZANidine (ZANAFLEX) 4 MG tablet Take 1 tablet by mouth Every Night.   9/21/2023    albuterol (PROVENTIL) (2.5 MG/3ML) 0.083% nebulizer solution Take  by nebulization As Needed.   Unknown    cetirizine (zyrTEC) 10 MG tablet Take 1 tablet by mouth As Needed.   Unknown    meclizine (ANTIVERT) 25 MG tablet Take 1 tablet by mouth As Needed.   Unknown    Ubrelvy 100 MG tablet Take 1 tablet by mouth As Needed.   Unknown    vitamin D (ERGOCALCIFEROL) 1.25 MG (93741 UT) capsule capsule    9/18/2023       Allergies:  Cyclobenzaprine, Codeine, Cortisporin [bacitra-neomycin-polymyxin-hc], Gabapentin, Lithium, Lyrica [pregabalin], Paxil [paroxetine], Soma [carisoprodol], Tetracyclines & related, and Methocarbamol    ROS:    Resp: No SOA  Cardiovascular: No CP      Objective     /90 (Patient Position: Sitting)   Pulse 88   Temp (!) 88 °F (31.1 °C) (Temporal)   Resp 22   Ht 170.2 cm (67\")   Wt 79.8 kg (176 lb)   SpO2 97%   BMI 27.57 kg/m² "     Physical Exam   Constitutional: Pt is oriented to person, place, and in no distress.  Pulmonary/Chest: No distress.  No audible wheezes   Psychiatric: Mood, memory, affect and judgment appear normal.     Assessment & Plan     Diagnosis:  Colon cancer screening  GERD  Dysphagia    Anticipated Surgical Procedure:  Endoscopy and colonoscopy    The risks, benefits, and alternatives of endoscopy were reviewed with the patient today.  Risks including perforation, with or without dilation, possibly requiring surgery.  Additional risks include risk of bleeding.  There is also the risk of a drug reaction or problems with anesthesia.  This will be discussed with the further by the anesthesia team on the day of the procedure. The benefits include the diagnosis and management of disease of the upper digestive tract.  Alternatives to endoscopy include upper GI series, laboratory testing, radiographic evaluation, or no intervention.  The patient verbalizes understanding and agrees.    The risks, benefits, and alternatives of colonoscopy were reviewed with the patient today.  Risks including perforation of the colon possibly requiring surgery or colostomy.  Additional risks include risk of bleeding from biopsies or removal of colon tissue.  There is also the risk of a drug reaction or problems with anesthesia.  This will be discussed with the further by the anesthesia team on the day of the procedure.  Lastly there is a possibility of missing a colon polyp or cancer.  The benefits include the diagnosis and management of disease of the colon and rectum.  Alternatives to colonoscopy include barium enema, laboratory testing, radiographic evaluation, or no intervention.  The patient verbalizes understanding and agrees.    Please note that portions of this note were completed with a voice recognition program.

## 2023-09-26 ENCOUNTER — TELEPHONE (OUTPATIENT)
Dept: NEUROLOGY | Age: 58
End: 2023-09-26

## 2023-09-26 ENCOUNTER — HOSPITAL ENCOUNTER (OUTPATIENT)
Dept: MRI IMAGING | Age: 58
Discharge: HOME | End: 2023-09-26
Payer: MEDICARE

## 2023-09-26 DIAGNOSIS — R20.2 LEG PARESTHESIA: ICD-10-CM

## 2023-09-26 DIAGNOSIS — R93.7 ABNORMAL MRI, LUMBAR SPINE: Primary | ICD-10-CM

## 2023-09-26 DIAGNOSIS — M54.2 CERVICAL PAIN: ICD-10-CM

## 2023-09-26 DIAGNOSIS — R93.7 ABNORMAL MRI, CERVICAL SPINE: ICD-10-CM

## 2023-09-26 DIAGNOSIS — M79.602 PAIN IN BOTH UPPER EXTREMITIES: ICD-10-CM

## 2023-09-26 DIAGNOSIS — R29.898 WEAKNESS OF HAND: ICD-10-CM

## 2023-09-26 DIAGNOSIS — M79.601 PAIN IN BOTH UPPER EXTREMITIES: ICD-10-CM

## 2023-09-26 DIAGNOSIS — R93.7 ABNORMAL X-RAY OF CERVICAL SPINE: ICD-10-CM

## 2023-09-26 DIAGNOSIS — R26.89 ANTALGIC GAIT: ICD-10-CM

## 2023-09-26 DIAGNOSIS — M54.50 LUMBAR PAIN: ICD-10-CM

## 2023-09-26 PROCEDURE — A9577 INJ MULTIHANCE: HCPCS | Performed by: NURSE PRACTITIONER

## 2023-09-26 PROCEDURE — 72158 MRI LUMBAR SPINE W/O & W/DYE: CPT

## 2023-09-26 PROCEDURE — 72156 MRI NECK SPINE W/O & W/DYE: CPT

## 2023-09-26 PROCEDURE — 6360000004 HC RX CONTRAST MEDICATION: Performed by: NURSE PRACTITIONER

## 2023-09-26 RX ADMIN — GADOBENATE DIMEGLUMINE 15 ML: 529 INJECTION, SOLUTION INTRAVENOUS at 09:43

## 2023-09-26 NOTE — TELEPHONE ENCOUNTER
Called and spoke with patient. I advised patient of BW note seen below. He voiced his understanding and had no other questions at this time. ----- Message from ELIA Lowery CNP sent at 9/26/2023 12:04 PM CDT -----  Please let him know there is abnormalities on both his cervical and his lumbar spine. There are multiple areas of nerve compression. Given findings along with his complaints I am going to send him to neurosurgery for an evaluation.

## 2023-09-28 ENCOUNTER — TELEPHONE (OUTPATIENT)
Dept: NEUROSURGERY | Age: 58
End: 2023-09-28

## 2023-09-28 NOTE — TELEPHONE ENCOUNTER
1st attempt to contact patient.  I WAS UNABLE TO LEAVE a voicemail instructing patient to call back at [phone] to schedule their new patient appointment     CALL FORWARDED, NO  SET UP    Abnormal MRI, lumbar spine  R93.7 (ICD-10-CM) - Abnormal MRI, cervical spine    [facility] C/L 9/26/23

## 2023-10-10 ENCOUNTER — OFFICE VISIT (OUTPATIENT)
Dept: NEUROSURGERY | Age: 58
End: 2023-10-10
Payer: MEDICARE

## 2023-10-10 VITALS
BODY MASS INDEX: 25.9 KG/M2 | HEART RATE: 92 BPM | SYSTOLIC BLOOD PRESSURE: 144 MMHG | WEIGHT: 165 LBS | HEIGHT: 67 IN | DIASTOLIC BLOOD PRESSURE: 84 MMHG

## 2023-10-10 DIAGNOSIS — M48.02 FORAMINAL STENOSIS OF CERVICAL REGION: Primary | ICD-10-CM

## 2023-10-10 DIAGNOSIS — G89.29 CHRONIC MIDLINE LOW BACK PAIN WITH BILATERAL SCIATICA: ICD-10-CM

## 2023-10-10 DIAGNOSIS — M54.42 CHRONIC MIDLINE LOW BACK PAIN WITH BILATERAL SCIATICA: ICD-10-CM

## 2023-10-10 DIAGNOSIS — M50.30 DDD (DEGENERATIVE DISC DISEASE), CERVICAL: ICD-10-CM

## 2023-10-10 DIAGNOSIS — M54.2 NECK PAIN: ICD-10-CM

## 2023-10-10 DIAGNOSIS — R20.2 BILATERAL ARM NUMBNESS AND TINGLING WHILE SLEEPING: ICD-10-CM

## 2023-10-10 DIAGNOSIS — M51.36 DDD (DEGENERATIVE DISC DISEASE), LUMBAR: ICD-10-CM

## 2023-10-10 DIAGNOSIS — R20.0 BILATERAL ARM NUMBNESS AND TINGLING WHILE SLEEPING: ICD-10-CM

## 2023-10-10 DIAGNOSIS — M54.41 CHRONIC MIDLINE LOW BACK PAIN WITH BILATERAL SCIATICA: ICD-10-CM

## 2023-10-10 PROCEDURE — 99204 OFFICE O/P NEW MOD 45 MIN: CPT | Performed by: NURSE PRACTITIONER

## 2023-10-10 RX ORDER — AMLODIPINE BESYLATE 5 MG/1
TABLET ORAL
COMMUNITY
Start: 2023-09-19

## 2023-10-10 ASSESSMENT — ENCOUNTER SYMPTOMS
GASTROINTESTINAL NEGATIVE: 1
BACK PAIN: 1
EYES NEGATIVE: 1
RESPIRATORY NEGATIVE: 1

## 2023-10-10 NOTE — PROGRESS NOTES
Review of Systems   Constitutional: Negative. HENT: Negative. Eyes: Negative. Respiratory: Negative. Cardiovascular: Negative. Gastrointestinal: Negative. Genitourinary: Negative. Musculoskeletal:  Positive for back pain, falls, joint pain, myalgias and neck pain. Skin: Negative. Neurological:  Positive for tingling and weakness. Endo/Heme/Allergies: Negative. Psychiatric/Behavioral: Negative.
deficits to light touch or pinprick sensation  Reflexes are 2+ and symmetric  Babinski sign was downgoing bilaterally  No clonus or Hoffmans sign  No myofacial tenderness to palpation  Very antalgic Gait pattern        DATA and IMAGING:    Nursing/pcp notes, imaging, labs, and vitals reviewed. PT,OT and/or speech notes reviewed    Lab Results   Component Value Date    WBC 4.7 (L) 10/26/2018    HGB 14.0 10/26/2018    HCT 41.6 (L) 10/26/2018    MCV 91.8 10/26/2018     10/26/2018     Lab Results   Component Value Date     10/26/2018    K 4.2 10/26/2018     10/26/2018    CO2 23 10/26/2018    BUN 15 10/26/2018    CREATININE 0.9 10/26/2018    GLUCOSE 107 10/26/2018    CALCIUM 9.1 10/26/2018    PROT 6.3 (L) 10/26/2018    LABALBU 4.0 10/26/2018    BILITOT <0.2 10/26/2018    ALKPHOS 91 10/26/2018    AST 14 10/26/2018    ALT 15 10/26/2018    LABGLOM >60 10/26/2018   No results found for: \"INR\", \"PROTIME\"    EXAM:  MRI OF THE BRAIN WITHOUT CONTRAST     HISTORY:  Loss of vision in the right eye     TECHNIQUE:  Multiplanar imaging of the brain was performed using T1, T2, inversion recovery, diffusion, susceptibility weighted sequences. Comparison none. FINDINGS:  There is no restricted diffusion. The lateral ventricles and cortical sulci are normal.  The basal cisterns are patent. Normal flow voids are identified within the basal cisterns. The seventh and eighth cranial nerve complexes are normal.    Normal flow signal is identified within the dural venous sinuses. No acute abnormalities are seen within the supratentorial white matter. No acute hemorrhages are seen. There is no mass effect. There are no extraaxial collections. Minimal scattered   T2 high signal foci are seen within the supratentorial white matter.      The craniocervical junction and midline structures are normal.  The soft tissues of the skull base and nasopharynx appear normal.  The paranasal sinuses and mastoid air cells

## 2023-10-20 DIAGNOSIS — G47.00 INSOMNIA, UNSPECIFIED TYPE: ICD-10-CM

## 2023-10-20 DIAGNOSIS — F41.9 ANXIETY DISORDER, UNSPECIFIED TYPE: ICD-10-CM

## 2023-10-20 RX ORDER — DESVENLAFAXINE 25 MG/1
25 TABLET, EXTENDED RELEASE ORAL DAILY
Qty: 30 TABLET | Refills: 1 | Status: SHIPPED | OUTPATIENT
Start: 2023-10-20

## 2023-10-20 RX ORDER — DOXEPIN HYDROCHLORIDE 25 MG/1
25 CAPSULE ORAL
Qty: 30 CAPSULE | Refills: 1 | Status: SHIPPED | OUTPATIENT
Start: 2023-10-20

## 2023-10-20 NOTE — TELEPHONE ENCOUNTER
[facility] called to request a refill on the following medications. Last office visit : 8/23/2023   Next office visit : 10/25/2023     Requested Prescriptions     Pending Prescriptions Disp Refills    doxepin (SINEQUAN) 25 MG capsule [Pharmacy Med Name: DOXEPIN 25MG CAPSULES] 30 capsule 1     Sig: TAKE 1 CAPSULE BY MOUTH EVERY NIGHT    desvenlafaxine succinate (PRISTIQ) 25 MG TB24 extended release tablet [Pharmacy Med Name: DESVENLAFAXINE ER SUCCINATE 25MG T] 30 tablet 1     Sig: TAKE 1 TABLET BY MOUTH DAILY            SOM Souza Neta Legacy, APRN - CNP  Nurse Practitioner Psychiatric/Mental Health Unspecified mood (affective) disorder ([address]) +5 more  Dx Medication Check  Reason for Visit     Progress Notes  ELIA Hendrix CNP (Nurse Practitioner)  Nurse Practitioner [facility] All Collapse All  Capri Portillo is a 60 y.o. male evaluated via telephone on 8/23/2023 for Medication Check  . Documentation:  I communicated with the patient and/or health care decision maker about see below. Details of this discussion including any medical advice provided: see below     Total Time: minutes: 21-30 minutes     Capri Portillo was evaluated through a synchronous (real-time) audio encounter. Patient identification was verified at the start of the visit. He (or guardian if applicable) is aware that this is a billable service, which includes applicable co-pays. This visit was conducted with the patient's (and/or legal guardian's) verbal consent. He has not had a related appointment within my department in the past 7 days or scheduled within the next 24 hours. The patient was located at Home: [address]  [address] [facility]. The provider was located at [facility] ([address]): [address] [facility]. [facility]  [address] [facility].      Note: not billable if this call serves to triage the patient into an appointment for the relevant

## 2023-10-23 ENCOUNTER — TELEPHONE (OUTPATIENT)
Dept: PSYCHIATRY | Age: 58
End: 2023-10-23

## 2023-10-23 NOTE — TELEPHONE ENCOUNTER
Called pt for appointment reminder.   -Pt confirmed    Electronically signed by Shruthi Lewis MA on 10/23/2023 at 1:38 PM

## 2023-10-25 ENCOUNTER — SCHEDULED TELEPHONE ENCOUNTER (OUTPATIENT)
Dept: PSYCHIATRY | Age: 58
End: 2023-10-25
Payer: MEDICAID

## 2023-10-25 DIAGNOSIS — F41.0 GENERALIZED ANXIETY DISORDER WITH PANIC ATTACKS: ICD-10-CM

## 2023-10-25 DIAGNOSIS — F39 UNSPECIFIED MOOD (AFFECTIVE) DISORDER (HCC): Primary | ICD-10-CM

## 2023-10-25 DIAGNOSIS — F60.9 PERSONALITY DISORDER, UNSPECIFIED (HCC): ICD-10-CM

## 2023-10-25 DIAGNOSIS — F95.9 TIC DISORDER: ICD-10-CM

## 2023-10-25 DIAGNOSIS — F41.1 GENERALIZED ANXIETY DISORDER WITH PANIC ATTACKS: ICD-10-CM

## 2023-10-25 DIAGNOSIS — F43.12 CHRONIC POST-TRAUMATIC STRESS DISORDER: ICD-10-CM

## 2023-10-25 DIAGNOSIS — G47.01 INSOMNIA DUE TO MEDICAL CONDITION: ICD-10-CM

## 2023-10-25 PROCEDURE — 99443 PR PHYS/QHP TELEPHONE EVALUATION 21-30 MIN: CPT

## 2023-10-25 NOTE — PROGRESS NOTES
10/26/2018    AST 14 10/26/2018    ALT 15 10/26/2018    LABGLOM >60 10/26/2018     Lab Results   Component Value Date/Time     10/26/2018 12:02 AM    K 4.2 10/26/2018 12:02 AM     10/26/2018 12:02 AM    CO2 23 10/26/2018 12:02 AM    BUN 15 10/26/2018 12:02 AM    CREATININE 0.9 10/26/2018 12:02 AM    GLUCOSE 107 10/26/2018 12:02 AM    CALCIUM 9.1 10/26/2018 12:02 AM      No results found for: \"CHOL\"  No results found for: \"TRIG\"  No results found for: \"HDL\"  No results found for: \"LDLCHOLESTEROL\", \"LDLCALC\"  No results found for: \"LABVLDL\", \"VLDL\"  No results found for: \"CHOLHDLRATIO\"  No results found for: \"LABA1C\"  No results found for: \"EAG\"  No results found for: \"TSHFT4\", \"TSH\"  No results found for: \"VITD25\"  No results found for: \"YFWPYUFT79\"   No results found for: \"FOLATE\"     Assessment:   1. Unspecified mood (affective) disorder ([address])    2. Generalized anxiety disorder with panic attacks    3. Tic disorder    4. Chronic post-traumatic stress disorder    5. Personality disorder, unspecified ([address])    6. Insomnia due to medical condition          No evidence of acute suicidality, homicidality or psychosis observed. Patient is psychiatrically stable    Plan:    1. Continue   Pristiq 25 mg daily for depression/anxiety/panic  Risperdal 0.5 mg BID for mood and tics   Prazosin 1 mg nightly for nightmares  Doxepin, increase to 25 mg nightly for sleep  Psychotherapy at [facility]         The risks, benefits, side effects, indications, contraindications, and adverse effects of the medications have been discussed. Yes.  2. The pt has verbalized understanding and has capacity to give informed consent. 3. The Abdulaziz Kulkarni report has been reviewed according to [facility] regulations. 4. Supportive therapy offered. 5. Follow up: Return in about 2 months (around 12/25/2023) for Medication Follow up (phone visit). 6. The patient has been advised to call with any problems.   7. Controlled substance Treatment Plan: n/a.  8.

## 2023-11-11 DIAGNOSIS — F51.5 NIGHTMARES ASSOCIATED WITH CHRONIC POST-TRAUMATIC STRESS DISORDER: ICD-10-CM

## 2023-11-11 DIAGNOSIS — F43.12 NIGHTMARES ASSOCIATED WITH CHRONIC POST-TRAUMATIC STRESS DISORDER: ICD-10-CM

## 2023-11-13 ENCOUNTER — TELEPHONE (OUTPATIENT)
Dept: PSYCHIATRY | Age: 58
End: 2023-11-13

## 2023-11-13 DIAGNOSIS — F43.12 NIGHTMARES ASSOCIATED WITH CHRONIC POST-TRAUMATIC STRESS DISORDER: ICD-10-CM

## 2023-11-13 DIAGNOSIS — F51.5 NIGHTMARES ASSOCIATED WITH CHRONIC POST-TRAUMATIC STRESS DISORDER: ICD-10-CM

## 2023-11-13 RX ORDER — PRAZOSIN HYDROCHLORIDE 1 MG/1
1 CAPSULE ORAL
Qty: 90 CAPSULE | OUTPATIENT
Start: 2023-11-13

## 2023-11-13 RX ORDER — PRAZOSIN HYDROCHLORIDE 1 MG/1
1 CAPSULE ORAL
Qty: 30 CAPSULE | Refills: 1 | Status: SHIPPED | OUTPATIENT
Start: 2023-11-13

## 2023-11-13 NOTE — TELEPHONE ENCOUNTER
Called and let pt know that his script for prazosin was sent to her pharmacy     Electronically signed by Hellen Pollack on 11/13/2023 at 11:16 AM

## 2023-11-13 NOTE — TELEPHONE ENCOUNTER
Pharmacy sent a request to refill pt's medication       Last office visit : 10/25/2023 Christo RODRIGEZ  Next office visit : 12/22/2023 Christo RODRIGEZ    Requested Prescriptions     Pending Prescriptions Disp Refills    prazosin (MINIPRESS) 1 MG capsule [Pharmacy Med Name: PRAZOSIN 1MG CAPSULES] 30 capsule 1     Sig: TAKE 1 CAPSULE BY MOUTH EVERY NIGHT            Jose R Daos       10/25/2023  [facility]    ELIA Brandt CNP  Nurse Practitioner Psychiatric/Mental Health Unspecified mood (affective) disorder ([address] [facility]) +5 more  Dx Medication Check  Reason for Visit     Progress Notes  ELIA Brandt CNP (Nurse Practitioner)   Nurse Practitioner [address] All Collapse All  Lawrence Posada is a 60 y.o. male evaluated via telephone on 10/25/2023 for Medication Check  . Documentation:  I communicated with the patient and/or health care decision maker about see below. Details of this discussion including any medical advice provided: see below     Total Time: minutes: 21-30 minutes     Lawrence Posada was evaluated through a synchronous (real-time) audio encounter. Patient identification was verified at the start of the visit. He (or guardian if applicable) is aware that this is a billable service, which includes applicable co-pays. This visit was conducted with the patient's (and/or legal guardian's) verbal consent. He has not had a related appointment within my department in the past 7 days or scheduled within the next 24 hours. The patient was located at Home: [address]  [address] [facility]. The provider was located at [facility] ([address] [place] [address]): [id number] [facility]. [address].      Note: not billable if this call serves to triage the patient into an appointment for the relevant concern     10/25/23                                         Progress Note     Sabra Hatfield 1965                       Chief

## 2023-11-19 DIAGNOSIS — G43.109 MIGRAINE WITH VISUAL AURA: ICD-10-CM

## 2023-11-20 RX ORDER — UBROGEPANT 100 MG/1
TABLET ORAL
Qty: 10 TABLET | Refills: 3 | Status: SHIPPED | OUTPATIENT
Start: 2023-11-20

## 2023-11-20 NOTE — TELEPHONE ENCOUNTER
Requested Prescriptions     Pending Prescriptions Disp Refills    Ubrogepant (UBRELVY) 100 MG TABS [Pharmacy Med Name: UBRELVY 100MG TABLETS] 10 tablet 3     Sig: TAKE 1 TABLET BY MOUTH AT ONSET OF MIGRAINE. MAY REPEAT 1 TIME IN 2 HOURS. IF NO IMPROVEMENT.  DO NOT EXCEED 2 TABLETS IN 24 HOURS       Last Office Visit: 7/19/2023  Next Office Visit: 12/19/2023  Last Medication Refill: 7/19/2023 with 3 RF

## 2023-12-04 ENCOUNTER — TELEPHONE (OUTPATIENT)
Dept: NEUROLOGY | Age: 58
End: 2023-12-04

## 2023-12-04 NOTE — TELEPHONE ENCOUNTER
Tried calling patient to let him know that I have him scheduled an appointment with MICHELLE Arredondo regarding his DME sleep machine/supplies. To where provider will continue for patient DME supplies. Unable to leave a VM regarding the appointment time/date.

## 2023-12-31 DIAGNOSIS — G47.00 INSOMNIA, UNSPECIFIED TYPE: ICD-10-CM

## 2024-01-02 RX ORDER — DOXEPIN HYDROCHLORIDE 25 MG/1
25 CAPSULE ORAL
Qty: 30 CAPSULE | Refills: 1 | OUTPATIENT
Start: 2024-01-02

## 2024-01-18 ENCOUNTER — TELEPHONE (OUTPATIENT)
Dept: PSYCHIATRY | Age: 59
End: 2024-01-18

## 2024-01-18 DIAGNOSIS — F41.0 GENERALIZED ANXIETY DISORDER WITH PANIC ATTACKS: ICD-10-CM

## 2024-01-18 DIAGNOSIS — F41.1 GENERALIZED ANXIETY DISORDER WITH PANIC ATTACKS: ICD-10-CM

## 2024-01-18 RX ORDER — DESVENLAFAXINE 25 MG/1
50 TABLET, EXTENDED RELEASE ORAL DAILY
Qty: 60 TABLET | Refills: 2 | Status: SHIPPED | OUTPATIENT
Start: 2024-01-18

## 2024-01-18 NOTE — TELEPHONE ENCOUNTER
Pt Called stated that he was at home having a panic attack. His pipes busted in his house so he had to call his landlord over to take a look. He stated he was upset because his house was a mess and he was afraid his landlord would put him out because of conditions of the house.  Pt asked MA if he could take another dose of Prestiq 25 Mg  that's prescribed to him by Ana Garcia to calm him down. He stated he only took one dose of his medication last night before bed. MA notified his provider in office Ana Garcia, she told him to take another dose and she would call in a new script with a higher dose to the pharmacy.  Pt asked MA about resources that could help him in his situation. MA told the pt that we would do some research and mail him resource list that could help him. MA provided pt with [facility]'s phone number which is [phone] and told him that they have many resources for people in the community. Pt Denied HI and SI. MA told pt that if he got worse to go to the ED or call an ambulance. Pt verbally stated his understanding. MA notified his provider Ana Garcia.

## 2024-01-29 ENCOUNTER — TELEPHONE (OUTPATIENT)
Dept: PSYCHIATRY | Age: 59
End: 2024-01-29

## 2024-01-29 NOTE — TELEPHONE ENCOUNTER
Mailed pt the paperwork with all the information that his insurance covers, that the pt did not know they covered.    Also, mailing resources around the community that might help the pt in his situation.    Electronically signed by Juliet Boyce MA on 1/29/2024 at 4:09 PM

## 2024-02-08 ENCOUNTER — OFFICE VISIT (OUTPATIENT)
Dept: NEUROLOGY | Age: 59
End: 2024-02-08
Payer: MEDICARE

## 2024-02-08 VITALS
BODY MASS INDEX: 27.78 KG/M2 | DIASTOLIC BLOOD PRESSURE: 79 MMHG | OXYGEN SATURATION: 95 % | HEART RATE: 88 BPM | WEIGHT: 177 LBS | SYSTOLIC BLOOD PRESSURE: 122 MMHG | HEIGHT: 67 IN

## 2024-02-08 DIAGNOSIS — G47.33 OBSTRUCTIVE SLEEP APNEA: Primary | ICD-10-CM

## 2024-02-08 DIAGNOSIS — Z99.89 CPAP (CONTINUOUS POSITIVE AIRWAY PRESSURE) DEPENDENCE: ICD-10-CM

## 2024-02-08 DIAGNOSIS — Z71.2 ENCOUNTER TO DISCUSS TEST RESULTS: ICD-10-CM

## 2024-02-08 PROCEDURE — 99214 OFFICE O/P EST MOD 30 MIN: CPT | Performed by: PHYSICIAN ASSISTANT

## 2024-02-08 RX ORDER — CEPHALEXIN 500 MG/1
CAPSULE ORAL
COMMUNITY
Start: 2024-02-05

## 2024-02-08 RX ORDER — METOPROLOL SUCCINATE 25 MG/1
25 TABLET, EXTENDED RELEASE ORAL NIGHTLY
COMMUNITY
Start: 2024-02-05

## 2024-02-08 RX ORDER — INDAPAMIDE 2.5 MG/1
TABLET ORAL
COMMUNITY
Start: 2023-12-27

## 2024-02-08 NOTE — PROGRESS NOTES
REVIEW OF SYSTEMS    Constitutional: []Fever []Sweats []Chills [] Recent Injury [x] Denies all unless marked  HEENT:[]Headache  [] Head Injury [] Hearing Loss  [] Sore Throat  [] Ear Ache [x] Denies all unless marked  Spine:  [] Neck pain  [] Back pain  [] Sciaticia  [x] Denies all unless marked  Cardiovascular:[]Heart Disease []Palpitations [] Chest Pain   [x] Denies all unless marked  Pulmonary: []Shortness of Breath []Cough   [x] Denies all unless marked  Psychiatric/Behavioral:[] Depression [] Anxiety [x] Denies all unless marked  Gastrointestinal: []Nausea  []Vomiting  []Abdominal Pain  []Constipation  []Diarrhea  [x] Denies all unless marked  Genitourinary:   [] Frequency  [] Urgency  [] Dysuria [] Incontinence  [x] Denies all unless marked  Extremities: []Pain  []Swelling  [x] Denies all unless marked  Musculoskeletal: [] Myalgias  [] Joint Pain  [] Arthritis [] Muscle Cramps [] Muscle Twitches  [x] Denies all unless marked  Sleep: [x]Insomnia[x]Snoring []Restless Legs  [x]Sleep Apnea  [x]Daytime Sleepiness  [x] Denies all unless marked  Skin:[] Rash [] Color Change [x] Denies all unless marked   Neurological:[]Visual Disturbance [] Memory Loss []Loss of Balance []Slurred Speech []Weakness []Seizures  [] Dizziness [x] Denies all unless marked

## 2024-02-08 NOTE — PROGRESS NOTES
[facility]  [address], [address]  [place] [address]  Phone [phone]  Fax [phone]       [facility] NEW PATIENT SLEEP CLINIC    Patient: Gennaro Hatfield  :  1965  Age:  58 y.o.  MRN:  [id number]  Today:  24    Provider:  Adriana Hameed PA-C    Chief Complaint:  Chief Complaint   Patient presents with    New Patient    Sleep Apnea     Patient completed sleep study on 23. He was set up on a CPAP by [facility] around 10/2023. Patient has c/o falling and staying asleep.        History Source: History obtained from chart review and the patient.  PCP: Yohan Aguirre DO     Referring Provider: Diaz Desai MD    HISTORY OF PRESENT ILLNESS:   Gennaro Hatfield is a 58 y.o. year old male who  has a past medical history of CPAP (continuous positive airway pressure) dependence, Fibromyalgia, Hiatal hernia, Low testosterone, FESTUS (obstructive sleep apnea), FESTUS (obstructive sleep apnea), and Tourette's. He was referred for  s/p PSG follow up, test results, and evaluation of current treatment plan.     The Home Sleep Test performed on 2023 showed obstructive sleep apnea with an apnea and hypopnea index of 28.6 events per hour.  He had oxygen desaturations as low as 87% and spent 7.8 minutes with an oxygen saturation less than 90%.  His pulse varied from 40 to 114 and averaged 71.0 beats per minute.     The Home Sleep Test performed on 8/10/2023 showed obstructive sleep apnea with an apnea and hypopnea index of 21.4 events per hour.  He had oxygen desaturations as low as 85% and spent 5.9 minutes with an oxygen saturation less than 90%.  His pulse varied from 57 to 119 and averaged 72.3 beats per minute.      Today he reports that he makes attempts at CPAP use each night.  He has a history of poor sleep duration. He sleeps in 3-5 hour increments. He indicates that, despite the minimal sleep duration, his sleep is restorative.  He denies snoring over therapy. He denies

## 2024-03-20 ENCOUNTER — TELEPHONE (OUTPATIENT)
Dept: PSYCHIATRY | Age: 59
End: 2024-03-20

## 2024-03-21 NOTE — TELEPHONE ENCOUNTER
Called pt on 03/20/24 for appointment reminder for 03/22/24.    -Pt confirmed    Electronically signed by Juliet Boyce MA on 3/20/2024 at 10:41 PM

## 2024-03-22 ENCOUNTER — SCHEDULED TELEPHONE ENCOUNTER (OUTPATIENT)
Dept: PSYCHIATRY | Age: 59
End: 2024-03-22
Payer: MEDICARE

## 2024-03-22 DIAGNOSIS — F43.12 CHRONIC POST-TRAUMATIC STRESS DISORDER: ICD-10-CM

## 2024-03-22 DIAGNOSIS — G47.01 INSOMNIA DUE TO MEDICAL CONDITION: ICD-10-CM

## 2024-03-22 DIAGNOSIS — F34.9 PERSISTENT MOOD (AFFECTIVE) DISORDER, UNSPECIFIED (HCC): Primary | ICD-10-CM

## 2024-03-22 DIAGNOSIS — F41.1 GENERALIZED ANXIETY DISORDER WITH PANIC ATTACKS: ICD-10-CM

## 2024-03-22 DIAGNOSIS — F41.0 GENERALIZED ANXIETY DISORDER WITH PANIC ATTACKS: ICD-10-CM

## 2024-03-22 DIAGNOSIS — F95.9 TIC DISORDER: ICD-10-CM

## 2024-03-22 PROCEDURE — 99443 PR PHYS/QHP TELEPHONE EVALUATION 21-30 MIN: CPT

## 2024-03-22 RX ORDER — DESVENLAFAXINE 25 MG/1
50 TABLET, EXTENDED RELEASE ORAL DAILY
Qty: 60 TABLET | Refills: 2 | Status: SHIPPED | OUTPATIENT
Start: 2024-03-22

## 2024-03-22 NOTE — PROGRESS NOTES
Gennaro Hatfield is a 58 y.o. male evaluated via telephone on 3/22/2024 for Medication Check  .        Documentation:  I communicated with the patient and/or health care decision maker about see below.   Details of this discussion including any medical advice provided: see below    Total Time: minutes: 21-30 minutes    Gennaro Hatfield was evaluated through a synchronous (real-time) audio encounter. Patient identification was verified at the start of the visit. He (or guardian if applicable) is aware that this is a billable service, which includes applicable co-pays. This visit was conducted with the patient's (and/or legal guardian's) verbal consent. He has not had a related appointment within my department in the past 7 days or scheduled within the next 24 hours.   The patient was located at Home: [address] [place] [address] [address]  [place] [id number].  The provider was located at Facility (Appt Dept): [facility] [facility].  [address]  [place] [address].    Note: not billable if this call serves to triage the patient into an appointment for the relevant concern    3/22/24        Progress Note    Gennaro Hatfield 1965      Chief Complaint   Patient presents with    Medication Check       Goes by [place]  Subjective:    Patient is a 58 y.o. male diagnosed with mood disorder, anxiety, tic disorder, insomnia and presents today for follow-up.  Last seen in clinic on 12/22/23 and prior records were reviewed.    Last visit: \"'ve been doing okay, been a little stressed with stuff around here.\"   Patient interviewed via telephone visit. He is alert and oriented x 4. Reports mood has been doing okay, has been stressed to living environment, however he did talk with his PCP office and  that is helping him look for new housing. He reports his trailer is infested with roaches, reports mold in his bathroom, \"it's falling apart around me\". Talks excitedly about his new dog that he got, still has his older dog. Re-homed his

## 2024-03-29 ENCOUNTER — TRANSCRIBE ORDERS (OUTPATIENT)
Dept: ADMINISTRATIVE | Facility: HOSPITAL | Age: 59
End: 2024-03-29
Payer: MEDICARE

## 2024-03-29 DIAGNOSIS — N20.2 CALCULUS OF KIDNEY AND URETER: Primary | ICD-10-CM

## 2024-04-01 ENCOUNTER — TELEPHONE (OUTPATIENT)
Dept: NEUROSURGERY | Age: 59
End: 2024-04-01

## 2024-04-01 NOTE — TELEPHONE ENCOUNTER
Xavi with  called voicing she had received a message needing MRI pushed to our facility. She states the voicemail stated marck was requesting dimargarin be sent to [facility] neurology. However xavi states the patient has not had any MRI at their facility

## 2024-04-02 ENCOUNTER — OFFICE VISIT (OUTPATIENT)
Dept: CARDIOLOGY | Facility: CLINIC | Age: 59
End: 2024-04-02
Payer: MEDICARE

## 2024-04-02 VITALS
BODY MASS INDEX: 29.66 KG/M2 | WEIGHT: 189 LBS | OXYGEN SATURATION: 98 % | SYSTOLIC BLOOD PRESSURE: 152 MMHG | DIASTOLIC BLOOD PRESSURE: 86 MMHG | HEART RATE: 68 BPM | HEIGHT: 67 IN | RESPIRATION RATE: 18 BRPM

## 2024-04-02 DIAGNOSIS — R07.9 CHEST PAIN, UNSPECIFIED TYPE: Primary | ICD-10-CM

## 2024-04-02 DIAGNOSIS — R00.2 PALPITATIONS: ICD-10-CM

## 2024-04-02 DIAGNOSIS — E78.5 HYPERLIPIDEMIA, UNSPECIFIED HYPERLIPIDEMIA TYPE: ICD-10-CM

## 2024-04-02 DIAGNOSIS — I10 ESSENTIAL HYPERTENSION: ICD-10-CM

## 2024-04-02 DIAGNOSIS — R06.02 SHORTNESS OF BREATH: ICD-10-CM

## 2024-04-02 PROCEDURE — 99204 OFFICE O/P NEW MOD 45 MIN: CPT | Performed by: HOSPITALIST

## 2024-04-02 PROCEDURE — 93000 ELECTROCARDIOGRAM COMPLETE: CPT | Performed by: HOSPITALIST

## 2024-04-02 RX ORDER — INDAPAMIDE 2.5 MG/1
2.5 TABLET ORAL
COMMUNITY
Start: 2023-12-27

## 2024-04-02 RX ORDER — METOPROLOL SUCCINATE 25 MG/1
25 TABLET, EXTENDED RELEASE ORAL
COMMUNITY
Start: 2024-02-05

## 2024-04-02 RX ORDER — TAMSULOSIN HYDROCHLORIDE 0.4 MG/1
CAPSULE ORAL
COMMUNITY
Start: 2024-03-20

## 2024-04-02 RX ORDER — AMLODIPINE BESYLATE 5 MG/1
1 TABLET ORAL DAILY
COMMUNITY
Start: 2024-03-13

## 2024-04-02 NOTE — PATIENT INSTRUCTIONS
Blood Pressure Monitoring  Please check your blood pressure at least once daily ~2-8 hours after you have taken your morning blood pressure medications so that we can assess how well your blood pressure medications are working.  Please write down your blood pressure readings in a log and bring that blood pressure log with you to your next cardiology clinic appointment.

## 2024-04-02 NOTE — PROGRESS NOTES
Reason For Visit:  Chest pain, palpitations    Subjective        Sean Garcia is a 58 y.o. male with the below pertinent PMH who is referred for chest pain and palpitations.    The patient was seen in primary care 3/5/2024 and for URI; he also endorsed heart palpitations and chest pain; was referred for cardiology to further evaluate.    The patient states that he has had palpitations on and off for years, but they have been worsening in the past year.  He describes episodes where it feels like he has fluttering in his chest for period of time followed by brief pause; he has not had syncope.  He has not noticed any clear triggers for these episodes but states that they sometimes occur every couple of days or sometimes only occur a couple of times per month.  He also states that in the past year he has had progressively worsening exertional dyspnea and chest discomfort.  He states that his chest discomfort will sometimes occur randomly, but it is most commonly brought on by activity.  He denies significant lightheadedness, orthopnea, or peripheral edema.    ROS: Pertinent findings are included above.     Pertinent PMH  Hypertension  Hyperlipidemia  Sleep apnea  COPD/Asthma  Former Tobacco Use  GERD  Possible stroke history-details unclear  Ménière's disease  Migraines  Schizoaffective disorder    Pertinent past medical, surgical, family, and social history were reviewed and updated in the EMR.      Current Outpatient Medications:     Advair Diskus 250-50 MCG/DOSE DISKUS, Inhale 1 puff 2 (Two) Times a Day., Disp: , Rfl:     albuterol (PROVENTIL) (2.5 MG/3ML) 0.083% nebulizer solution, Take  by nebulization As Needed., Disp: , Rfl:     amLODIPine (NORVASC) 5 MG tablet, Take 1 tablet by mouth Daily., Disp: , Rfl:     cetirizine (zyrTEC) 10 MG tablet, Take 1 tablet by mouth As Needed., Disp: , Rfl:     Desvenlafaxine Succinate ER 25 MG tablet sustained-release 24 hour, Take 1 tablet by mouth Daily., Disp: , Rfl:      "famotidine (PEPCID) 40 MG tablet, Take 1 tablet by mouth 2 (Two) Times a Day., Disp: , Rfl:     indapamide (LOZOL) 2.5 MG tablet, Take 1 tablet by mouth., Disp: , Rfl:     meclizine (ANTIVERT) 25 MG tablet, Take 1 tablet by mouth As Needed., Disp: , Rfl:     metoprolol succinate XL (TOPROL-XL) 25 MG 24 hr tablet, Take 1 tablet by mouth every night at bedtime., Disp: , Rfl:     naproxen (NAPROSYN) 500 MG tablet, Take 1 tablet by mouth As Needed., Disp: , Rfl:     pantoprazole (PROTONIX) 40 MG EC tablet, Take 1 tablet by mouth 2 (Two) Times a Day., Disp: , Rfl:     ProAir  (90 Base) MCG/ACT inhaler, Inhale 2 puffs As Needed., Disp: , Rfl:     rosuvastatin (CRESTOR) 10 MG tablet, Take 1 tablet by mouth Daily., Disp: , Rfl:     tamsulosin (FLOMAX) 0.4 MG capsule 24 hr capsule, Take 1 at night, Disp: , Rfl:     Testosterone Cypionate (DEPOTESTOTERONE CYPIONATE) 200 MG/ML injection, Inject 1 mL into the appropriate muscle as directed by prescriber 1 (One) Time Per Week., Disp: , Rfl:     tiZANidine (ZANAFLEX) 4 MG tablet, Take 1 tablet by mouth Every Night., Disp: , Rfl:     Ubrelvy 100 MG tablet, Take 1 tablet by mouth As Needed., Disp: , Rfl:     vitamin D (ERGOCALCIFEROL) 1.25 MG (28699 UT) capsule capsule, , Disp: , Rfl:     doxepin (SINEquan) 25 MG capsule, Take 1 capsule by mouth Every Night. (Patient not taking: Reported on 4/2/2024), Disp: , Rfl:     prazosin (MINIPRESS) 1 MG capsule, 1 capsule Every Night. (Patient not taking: Reported on 4/2/2024), Disp: , Rfl:     risperiDONE (risperDAL) 0.5 MG tablet, Take 2 tablets by mouth 2 (Two) Times a Day. (Patient not taking: Reported on 4/2/2024), Disp: , Rfl:      Objective   Vital Signs:  /86   Pulse 68   Resp 18   Ht 170.2 cm (67\")   Wt 85.7 kg (189 lb)   SpO2 98%   BMI 29.60 kg/m²   Estimated body mass index is 29.6 kg/m² as calculated from the following:    Height as of this encounter: 170.2 cm (67\").    Weight as of this encounter: 85.7 kg (189 " lb).      Constitutional:       Appearance: Healthy appearance. Not in distress.   Neck:      Vascular: JVD normal.   Pulmonary:      Effort: Pulmonary effort is normal.      Breath sounds: Normal breath sounds.   Cardiovascular:      Normal rate. Regular rhythm.      Murmurs: There is no murmur.      No gallop.  No click. No rub.   Edema:     Peripheral edema absent.   Abdominal:      General: There is no distension.      Palpations: Abdomen is soft.      Tenderness: There is no abdominal tenderness.   Skin:     General: Skin is warm and dry.   Neurological:      Mental Status: Alert and oriented to person, place and time.        Result Review :           ECG 12 Lead    Date/Time: 4/2/2024 2:50 PM  Performed by: Bryant Combs MD    Authorized by: Bryant Combs MD  Previous ECG: no previous ECG available  Rhythm: sinus rhythm  Rate: normal  BPM: 68  Conduction: conduction normal  QRS axis: normal  Other findings: left ventricular hypertrophy  Clinical impression comment: Borderline ECG            Assessment and Plan   Diagnoses and all orders for this visit:    1. Chest pain, unspecified type (Primary)  -     Adult Stress Echo W/ Cont or Stress Agent if Necessary Per Protocol; Future  -     ECG 12 Lead    2. Palpitations  -     Holter Monitor - 72 Hour Up To 15 Days; Future  -     TSH Rfx On Abnormal To Free T4; Future    3. Hyperlipidemia, unspecified hyperlipidemia type  - Continue Crestor 10 mg daily  - Obtain fasting lipid panel at follow-up  - Given questionable stroke history, could consider more aggressive LDL target of at least 70  - He is not currently on antiplatelet therapy and states that he has had difficulty tolerating aspirin in the past due to tinnitus with his Ménière's disease; given questionable stroke history I will not restart aspirin today, but we will try to help clarify this at follow-up and if stress testing is abnormal consider starting Plavix 75 mg daily.    4. Shortness of breath  -      Adult Stress Echo W/ Cont or Stress Agent if Necessary Per Protocol; Future  -     BNP; Future    5. Essential hypertension  -BP elevated in clinic today but reportedly better at home  - Continue amlodipine 5 mg daily, indapamide 2.5 mg daily, and Toprol-XL 25 mg daily  - Patient encouraged to keep home BP log and bring it to his follow-up for review to help guide medication titrations    Follow Up   Return in about 4 weeks (around 4/30/2024).  Patient was given instructions and counseling regarding his condition or for health maintenance advice. Please see specific information pulled into the AVS if appropriate.       EMR Dragon/Transcription disclaimer: Much of this encounter note is an electronic transcription/translation of spoken language to printed text. The electronic translation of spoken language may permit erroneous, or at times, nonsensical words or phrases to be inadvertently transcribed; although I have reviewed the note for such errors, some may still exist.

## 2024-04-04 ENCOUNTER — HOSPITAL ENCOUNTER (OUTPATIENT)
Dept: CT IMAGING | Facility: HOSPITAL | Age: 59
Discharge: HOME | End: 2024-04-04
Admitting: INTERNAL MEDICINE
Payer: MEDICARE

## 2024-04-04 PROCEDURE — 74176 CT ABD & PELVIS W/O CONTRAST: CPT

## 2024-04-11 ENCOUNTER — LAB (OUTPATIENT)
Dept: LAB | Facility: HOSPITAL | Age: 59
End: 2024-04-11
Payer: MEDICARE

## 2024-04-11 ENCOUNTER — HOSPITAL ENCOUNTER (OUTPATIENT)
Dept: CARDIOLOGY | Facility: HOSPITAL | Age: 59
Discharge: HOME | End: 2024-04-11
Payer: MEDICARE

## 2024-04-11 VITALS — HEART RATE: 76 BPM | DIASTOLIC BLOOD PRESSURE: 66 MMHG | SYSTOLIC BLOOD PRESSURE: 122 MMHG

## 2024-04-11 DIAGNOSIS — E78.5 HYPERLIPIDEMIA, UNSPECIFIED HYPERLIPIDEMIA TYPE: ICD-10-CM

## 2024-04-11 DIAGNOSIS — R06.02 SHORTNESS OF BREATH: ICD-10-CM

## 2024-04-11 DIAGNOSIS — R00.2 PALPITATIONS: ICD-10-CM

## 2024-04-11 DIAGNOSIS — I10 ESSENTIAL HYPERTENSION: ICD-10-CM

## 2024-04-11 DIAGNOSIS — R07.9 CHEST PAIN, UNSPECIFIED TYPE: ICD-10-CM

## 2024-04-11 LAB
ANION GAP SERPL CALCULATED.3IONS-SCNC: 7 MMOL/L (ref 5–15)
BH CV ECHO MEAS - EDV(MOD-SP4): 109.2 ML
BH CV ECHO MEAS - EF(MOD-SP4): 59.8 %
BH CV ECHO MEAS - ESV(MOD-SP4): 43.9 ML
BH CV ECHO MEAS - SV(MOD-SP4): 65.3 ML
BH CV STRESS BP STAGE 1: NORMAL
BH CV STRESS BP STAGE 2: NORMAL
BH CV STRESS BP STAGE 3: NORMAL
BH CV STRESS BP STAGE 4: NORMAL
BH CV STRESS DOSE DOBUTAMINE STAGE 1: 10
BH CV STRESS DOSE DOBUTAMINE STAGE 2: 20
BH CV STRESS DOSE DOBUTAMINE STAGE 3: 30
BH CV STRESS DOSE DOBUTAMINE STAGE 4: 40
BH CV STRESS DURATION MIN STAGE 1: 3
BH CV STRESS DURATION MIN STAGE 2: 3
BH CV STRESS DURATION MIN STAGE 3: 3
BH CV STRESS DURATION MIN STAGE 4: 3
BH CV STRESS DURATION SEC STAGE 1: 0
BH CV STRESS DURATION SEC STAGE 2: 0
BH CV STRESS DURATION SEC STAGE 3: 0
BH CV STRESS DURATION SEC STAGE 4: 0
BH CV STRESS HR STAGE 1: 87
BH CV STRESS HR STAGE 2: 94
BH CV STRESS HR STAGE 3: 120
BH CV STRESS HR STAGE 4: 136
BH CV STRESS PROTOCOL 1: NORMAL
BH CV STRESS STAGE 1: 1
BH CV STRESS STAGE 2: 2
BH CV STRESS STAGE 3: 3
BH CV STRESS STAGE 4: 4
BUN SERPL-MCNC: 14 MG/DL (ref 6–20)
BUN/CREAT SERPL: 8.9 (ref 7–25)
CALCIUM SPEC-SCNC: 9.1 MG/DL (ref 8.6–10.5)
CHLORIDE SERPL-SCNC: 101 MMOL/L (ref 98–107)
CHOLEST SERPL-MCNC: 82 MG/DL (ref 0–200)
CO2 SERPL-SCNC: 31 MMOL/L (ref 22–29)
CREAT SERPL-MCNC: 1.58 MG/DL (ref 0.76–1.27)
EGFRCR SERPLBLD CKD-EPI 2021: 50.1 ML/MIN/1.73
GLUCOSE SERPL-MCNC: 88 MG/DL (ref 65–99)
HDLC SERPL-MCNC: 35 MG/DL (ref 40–60)
LDLC SERPL CALC-MCNC: 28 MG/DL (ref 0–100)
LDLC/HDLC SERPL: 0.79 {RATIO}
MAXIMAL PREDICTED HEART RATE: 161 BPM
NT-PROBNP SERPL-MCNC: 137.2 PG/ML (ref 0–900)
PERCENT MAX PREDICTED HR: 84.47 %
POTASSIUM SERPL-SCNC: 3.7 MMOL/L (ref 3.5–5.2)
SODIUM SERPL-SCNC: 139 MMOL/L (ref 136–145)
STRESS BASELINE BP: NORMAL MMHG
STRESS BASELINE HR: 70 BPM
STRESS PERCENT HR: 99 %
STRESS POST PEAK BP: NORMAL MMHG
STRESS POST PEAK HR: 136 BPM
STRESS TARGET HR: 137 BPM
TRIGL SERPL-MCNC: 96 MG/DL (ref 0–150)
TSH SERPL DL<=0.05 MIU/L-ACNC: 0.66 UIU/ML (ref 0.27–4.2)
VLDLC SERPL-MCNC: 19 MG/DL (ref 5–40)

## 2024-04-11 PROCEDURE — 84443 ASSAY THYROID STIM HORMONE: CPT

## 2024-04-11 PROCEDURE — 36415 COLL VENOUS BLD VENIPUNCTURE: CPT

## 2024-04-11 PROCEDURE — 0 DOBUTAMINE PER 250 MG: Performed by: HOSPITALIST

## 2024-04-11 PROCEDURE — 80061 LIPID PANEL: CPT

## 2024-04-11 PROCEDURE — 93017 CV STRESS TEST TRACING ONLY: CPT

## 2024-04-11 PROCEDURE — 93350 STRESS TTE ONLY: CPT

## 2024-04-11 PROCEDURE — 25510000001 PERFLUTREN 6.52 MG/ML SUSPENSION: Performed by: HOSPITALIST

## 2024-04-11 PROCEDURE — 80048 BASIC METABOLIC PNL TOTAL CA: CPT

## 2024-04-11 PROCEDURE — 83880 ASSAY OF NATRIURETIC PEPTIDE: CPT

## 2024-04-11 RX ORDER — DOBUTAMINE HYDROCHLORIDE 100 MG/100ML
10-50 INJECTION INTRAVENOUS CONTINUOUS
Status: DISCONTINUED | OUTPATIENT
Start: 2024-04-11 | End: 2024-04-12 | Stop reason: HOSPADM

## 2024-04-11 RX ADMIN — DOBUTAMINE HYDROCHLORIDE 10 MCG/KG/MIN: 100 INJECTION INTRAVENOUS at 13:32

## 2024-04-11 RX ADMIN — PERFLUTREN 8.48 MG: 6.52 INJECTION, SUSPENSION INTRAVENOUS at 13:32

## 2024-04-12 NOTE — PROGRESS NOTES
Pt has been notified of test results. He has been told he is in stage 3 kidney dysfunction since 2012 and has recently had an ct done just waiting on the results and is already seeing a urologist. He stated he will have the results faxed to us from the ct.  Labs have been routed to you from PCP.

## 2024-04-15 ENCOUNTER — OFFICE VISIT (OUTPATIENT)
Dept: ENT CLINIC | Age: 59
End: 2024-04-15
Payer: MEDICARE

## 2024-04-15 VITALS
SYSTOLIC BLOOD PRESSURE: 120 MMHG | WEIGHT: 191 LBS | DIASTOLIC BLOOD PRESSURE: 80 MMHG | BODY MASS INDEX: 29.98 KG/M2 | HEIGHT: 67 IN

## 2024-04-15 DIAGNOSIS — M26.609 TMJ DYSFUNCTION: Primary | ICD-10-CM

## 2024-04-15 DIAGNOSIS — K21.9 LARYNGOPHARYNGEAL REFLUX: ICD-10-CM

## 2024-04-15 PROCEDURE — 99204 OFFICE O/P NEW MOD 45 MIN: CPT | Performed by: NURSE PRACTITIONER

## 2024-04-15 PROCEDURE — 31575 DIAGNOSTIC LARYNGOSCOPY: CPT | Performed by: NURSE PRACTITIONER

## 2024-04-15 ASSESSMENT — ENCOUNTER SYMPTOMS
EYES NEGATIVE: 1
ALLERGIC/IMMUNOLOGIC NEGATIVE: 1
VOICE CHANGE: 1
RESPIRATORY NEGATIVE: 1
TROUBLE SWALLOWING: 1
GASTROINTESTINAL NEGATIVE: 1

## 2024-04-15 NOTE — PROGRESS NOTES
4/15/2024    Gennaro Hatfield (:  1965) is a 58 y.o. male, Established patient, here for evaluation of the following chief complaint(s):  New Patient (TMJ)      Vitals:    04/15/24 1354   BP: 120/80   Weight: 86.6 kg (191 lb)   Height: 1.702 m (5' 7\")       Wt Readings from Last 3 Encounters:   04/15/24 86.6 kg (191 lb)   24 80.3 kg (177 lb)   10/10/23 74.8 kg (165 lb)       BP Readings from Last 3 Encounters:   04/15/24 120/80   24 122/79   10/10/23 (!) 144/84         SUBJECTIVE/OBJECTIVE:    Patient seen today for TMJ and his throat. He states that he has had TMJ issues most of his life. He states that most of his issues have been on the right side and now the left is starting to bother him.He takes naprosyn and tizanidine currently. He states that in the last few months he has developed hoarseness and a dry spot in his throat. He states he has had this before and it was a bone spur growing in to his esophagus. He states that he does have difficulty swallowing and has had issues with this for a long time. He saw his GI in March and they did an endoscopy and said it was normal. He takes Protonix and famotidine.         Review of Systems   Constitutional: Negative.    HENT:  Positive for trouble swallowing and voice change.    Eyes: Negative.    Respiratory: Negative.     Cardiovascular: Negative.    Gastrointestinal: Negative.    Endocrine: Negative.    Musculoskeletal:  Positive for arthralgias (TMJ).   Skin: Negative.    Allergic/Immunologic: Negative.    Neurological: Negative.    Hematological: Negative.    Psychiatric/Behavioral: Negative.          Physical Exam  Vitals reviewed.   Constitutional:       Appearance: Normal appearance. He is normal weight.   HENT:      Head: Normocephalic and atraumatic.      Right Ear: External ear normal.      Left Ear: External ear normal.      Nose: Nose normal.      Mouth/Throat:      Mouth: Mucous membranes are moist.      Dentition: Abnormal

## 2024-04-19 ENCOUNTER — TELEPHONE (OUTPATIENT)
Dept: PSYCHIATRY | Age: 59
End: 2024-04-19

## 2024-04-19 DIAGNOSIS — F41.1 GENERALIZED ANXIETY DISORDER WITH PANIC ATTACKS: Primary | ICD-10-CM

## 2024-04-19 DIAGNOSIS — F41.0 GENERALIZED ANXIETY DISORDER WITH PANIC ATTACKS: Primary | ICD-10-CM

## 2024-04-19 RX ORDER — BUSPIRONE HYDROCHLORIDE 10 MG/1
10 TABLET ORAL 3 TIMES DAILY
Qty: 90 TABLET | Refills: 0 | Status: SHIPPED | OUTPATIENT
Start: 2024-04-19 | End: 2024-05-19

## 2024-04-19 NOTE — TELEPHONE ENCOUNTER
Pt called stating that his panic attacks are bad again. Had a cat scan the other and that's when it started. Feels like he is trapped in a plexiglass box. Also that his medication is working. Pt hasn't hasn't been sleeping good.    Above information was giving to Ana RODRIGEZ     Per provider called pt to let him know that a script for buspar 3 times a day. Also he doesn't have to take it 3 times a day if doesn't need to. Also to started pt on a low dose of doxepin for sleep.    Electronically signed by Jose R Rondon on 4/19/2024 at 4:21 PM

## 2024-04-21 DIAGNOSIS — M54.50 LUMBAR PAIN: ICD-10-CM

## 2024-04-22 RX ORDER — TIZANIDINE 4 MG/1
4 TABLET ORAL NIGHTLY
Qty: 30 TABLET | Refills: 2 | Status: SHIPPED | OUTPATIENT
Start: 2024-04-22

## 2024-04-22 NOTE — TELEPHONE ENCOUNTER
Gennaro Hatfield has requested a refill on his medication.      Last office visit : 7/19/2023   Next office visit : Visit date not found       Requested Prescriptions     Pending Prescriptions Disp Refills    tiZANidine (ZANAFLEX) 4 MG tablet [Pharmacy Med Name: tizanidine 4 mg tablet] 30 tablet 2     Sig: TAKE ONE TABLET BY MOUTH at bedtime

## 2024-04-25 ENCOUNTER — OFFICE VISIT (OUTPATIENT)
Dept: GASTROENTEROLOGY | Age: 59
End: 2024-04-25
Payer: MEDICARE

## 2024-04-25 VITALS
BODY MASS INDEX: 29.51 KG/M2 | DIASTOLIC BLOOD PRESSURE: 80 MMHG | OXYGEN SATURATION: 96 % | HEART RATE: 86 BPM | WEIGHT: 188 LBS | SYSTOLIC BLOOD PRESSURE: 139 MMHG | HEIGHT: 67 IN

## 2024-04-25 DIAGNOSIS — K21.9 GASTROESOPHAGEAL REFLUX DISEASE, UNSPECIFIED WHETHER ESOPHAGITIS PRESENT: ICD-10-CM

## 2024-04-25 DIAGNOSIS — K44.9 HIATAL HERNIA: ICD-10-CM

## 2024-04-25 DIAGNOSIS — R13.10 DYSPHAGIA, UNSPECIFIED TYPE: ICD-10-CM

## 2024-04-25 DIAGNOSIS — K64.9 HEMORRHOIDS, UNSPECIFIED HEMORRHOID TYPE: Primary | ICD-10-CM

## 2024-04-25 PROCEDURE — 99204 OFFICE O/P NEW MOD 45 MIN: CPT | Performed by: NURSE PRACTITIONER

## 2024-04-25 RX ORDER — OMEPRAZOLE 40 MG/1
40 CAPSULE, DELAYED RELEASE ORAL 2 TIMES DAILY
Qty: 60 CAPSULE | Refills: 5 | Status: SHIPPED | OUTPATIENT
Start: 2024-04-25

## 2024-04-25 NOTE — PROGRESS NOTES
Medications   Medication Sig Dispense Refill    hydrocortisone 2.5 % cream Apply topically 2 times daily. 1 each 3    omeprazole (PRILOSEC) 40 MG delayed release capsule Take 1 capsule by mouth 2 times daily 60 capsule 5    tiZANidine (ZANAFLEX) 4 MG tablet TAKE ONE TABLET BY MOUTH at bedtime 30 tablet 2    busPIRone (BUSPAR) 10 MG tablet Take 1 tablet by mouth 3 times daily 90 tablet 0    desvenlafaxine succinate (PRISTIQ) 25 MG TB24 extended release tablet Take 2 tablets by mouth daily 60 tablet 2    metoprolol succinate (TOPROL XL) 25 MG extended release tablet Take 1 tablet by mouth nightly      indapamide (LOZOL) 2.5 MG tablet       Ubrogepant (UBRELVY) 100 MG TABS TAKE 1 TABLET BY MOUTH AT ONSET OF MIGRAINE. MAY REPEAT 1 TIME IN 2 HOURS. IF NO IMPROVEMENT. DO NOT EXCEED 2 TABLETS IN 24 HOURS 10 tablet 3    amLODIPine (NORVASC) 5 MG tablet       rosuvastatin (CRESTOR) 10 MG tablet       naproxen (NAPROSYN) 500 MG tablet       ergocalciferol (ERGOCALCIFEROL) 1.25 MG (55682 UT) capsule Take 1 capsule by mouth Every other Week      testosterone enanthate (DELATESTRYL) 200 MG/ML injection       fluticasone-salmeterol (ADVAIR) 250-50 MCG/ACT AEPB diskus inhaler Inhale 1 puff into the lungs 2 times daily      cetirizine (ZYRTEC) 10 MG tablet Take 1 tablet by mouth as needed      testosterone cypionate (DEPOTESTOTERONE CYPIONATE) 200 MG/ML injection Inject 1 mL into the muscle once a week.      famotidine (PEPCID) 40 MG tablet Take 1 tablet by mouth 2 times daily      meclizine (ANTIVERT) 25 MG tablet Take 1 tablet by mouth as needed      albuterol sulfate HFA (VENTOLIN HFA) 108 (90 Base) MCG/ACT inhaler Inhale 2 puffs into the lungs every 6 hours as needed for Wheezing 1 Inhaler 3     No current facility-administered medications for this visit.       Allergies   Allergen Reactions    Carisoprodol Anaphylaxis and Other (See Comments)     Night marcano  Unknown     Bacitra-Neomycin-Polymyxin-Hc Hives and Other (See

## 2024-04-30 ENCOUNTER — OFFICE VISIT (OUTPATIENT)
Dept: CARDIOLOGY | Facility: CLINIC | Age: 59
End: 2024-04-30
Payer: MEDICARE

## 2024-04-30 VITALS
HEART RATE: 112 BPM | BODY MASS INDEX: 28.69 KG/M2 | SYSTOLIC BLOOD PRESSURE: 144 MMHG | DIASTOLIC BLOOD PRESSURE: 96 MMHG | HEIGHT: 67 IN | WEIGHT: 182.8 LBS | OXYGEN SATURATION: 96 %

## 2024-04-30 DIAGNOSIS — R00.2 PALPITATIONS: ICD-10-CM

## 2024-04-30 DIAGNOSIS — I10 ESSENTIAL HYPERTENSION: ICD-10-CM

## 2024-04-30 DIAGNOSIS — R06.02 SHORTNESS OF BREATH: ICD-10-CM

## 2024-04-30 DIAGNOSIS — R07.9 CHEST PAIN, UNSPECIFIED TYPE: Primary | ICD-10-CM

## 2024-04-30 DIAGNOSIS — E78.5 HYPERLIPIDEMIA, UNSPECIFIED HYPERLIPIDEMIA TYPE: ICD-10-CM

## 2024-04-30 PROCEDURE — 99214 OFFICE O/P EST MOD 30 MIN: CPT

## 2024-04-30 RX ORDER — BUSPIRONE HYDROCHLORIDE 10 MG/1
1 TABLET ORAL 3 TIMES DAILY
COMMUNITY
Start: 2024-04-19

## 2024-04-30 RX ORDER — METOPROLOL SUCCINATE 50 MG/1
50 TABLET, EXTENDED RELEASE ORAL
Qty: 30 TABLET | Refills: 11 | Status: SHIPPED | OUTPATIENT
Start: 2024-04-30

## 2024-04-30 NOTE — PROGRESS NOTES
Reason For Visit:  Palpitations     Subjective        Sean Garcia is a 58 y.o. male with the below pertinent PMH who presents for follow-up of chest pain and palpitation.    Patient establish care with Dr. Combs on 4/2/2024.  This was a referral after the patient was complaining of heart palpitations and chest pain.  Dr. Combs ordered a stress echocardiogram as well as a variety of lab work.  Stress echo resulted showing a low risk for ischemia.  Holter monitor resulted showing no significant electrical abnormalities.    Since his previous point has been doing reasonly well.  Denies any significant concerns.  Still having some shortness of breath with minimal exertion, however this is stable.  Tolerating his regimen well without any significant dizziness or lightheadedness.  Did have 1 episode where he feels like his blood pressure drop below with lower heart rate, however this did not correlate to any significant events.  Denies any significant bleeding concerns.    ROS: Pertinent findings as noted above    Pertinent PMH  Hypertension  Hyperlipidemia  Sleep apnea  COPD/Asthma  Former Tobacco Use  GERD  Possible stroke history-details unclear  Ménière's disease  Migraines  Schizoaffective disorder    Pertinent past medical, surgical, family, and social history were reviewed.      Current Outpatient Medications:     Advair Diskus 250-50 MCG/DOSE DISKUS, Inhale 1 puff 2 (Two) Times a Day., Disp: , Rfl:     albuterol (PROVENTIL) (2.5 MG/3ML) 0.083% nebulizer solution, Take  by nebulization As Needed., Disp: , Rfl:     amLODIPine (NORVASC) 5 MG tablet, Take 1 tablet by mouth Daily., Disp: , Rfl:     busPIRone (BUSPAR) 10 MG tablet, Take 1 tablet by mouth 3 times a day., Disp: , Rfl:     cetirizine (zyrTEC) 10 MG tablet, Take 1 tablet by mouth As Needed., Disp: , Rfl:     Desvenlafaxine Succinate ER 25 MG tablet sustained-release 24 hour, Take 1 tablet by mouth Daily., Disp: , Rfl:     famotidine (PEPCID) 40 MG  "tablet, Take 1 tablet by mouth 2 (Two) Times a Day., Disp: , Rfl:     indapamide (LOZOL) 2.5 MG tablet, Take 1 tablet by mouth., Disp: , Rfl:     meclizine (ANTIVERT) 25 MG tablet, Take 1 tablet by mouth As Needed., Disp: , Rfl:     metoprolol succinate XL (TOPROL-XL) 25 MG 24 hr tablet, Take 1 tablet by mouth every night at bedtime., Disp: , Rfl:     naproxen (NAPROSYN) 500 MG tablet, Take 1 tablet by mouth As Needed., Disp: , Rfl:     pantoprazole (PROTONIX) 40 MG EC tablet, Take 1 tablet by mouth 2 (Two) Times a Day., Disp: , Rfl:     prazosin (MINIPRESS) 1 MG capsule, 1 capsule Every Night., Disp: , Rfl:     ProAir  (90 Base) MCG/ACT inhaler, Inhale 2 puffs As Needed., Disp: , Rfl:     risperiDONE (risperDAL) 0.5 MG tablet, Take 2 tablets by mouth 2 (Two) Times a Day., Disp: , Rfl:     rosuvastatin (CRESTOR) 10 MG tablet, Take 1 tablet by mouth Daily., Disp: , Rfl:     tamsulosin (FLOMAX) 0.4 MG capsule 24 hr capsule, Take 1 at night, Disp: , Rfl:     Testosterone Cypionate (DEPOTESTOTERONE CYPIONATE) 200 MG/ML injection, Inject 1 mL into the appropriate muscle as directed by prescriber 1 (One) Time Per Week., Disp: , Rfl:     tiZANidine (ZANAFLEX) 4 MG tablet, Take 1 tablet by mouth Every Night., Disp: , Rfl:     Ubrelvy 100 MG tablet, Take 1 tablet by mouth As Needed., Disp: , Rfl:     vitamin D (ERGOCALCIFEROL) 1.25 MG (64515 UT) capsule capsule, , Disp: , Rfl:     doxepin (SINEquan) 25 MG capsule, Take 1 capsule by mouth Every Night. (Patient not taking: Reported on 4/30/2024), Disp: , Rfl:      Objective   Vital Signs:  /96   Pulse 112   Ht 170.2 cm (67\")   Wt 82.9 kg (182 lb 12.8 oz)   SpO2 96%   BMI 28.63 kg/m²   Estimated body mass index is 28.63 kg/m² as calculated from the following:    Height as of this encounter: 170.2 cm (67\").    Weight as of this encounter: 82.9 kg (182 lb 12.8 oz).      Constitutional:       Appearance: Healthy appearance. Not in distress.   Neck:      Vascular: " JVD normal.   Pulmonary:      Effort: Pulmonary effort is normal.      Breath sounds: Normal breath sounds. No wheezing. No rhonchi. No rales.   Cardiovascular:      PMI at left midclavicular line. Normal rate. Regular rhythm. Normal S1. Normal S2.       Murmurs: There is no murmur.      No gallop.  No click. No rub.   Edema:     Peripheral edema absent.   Musculoskeletal: Normal range of motion.      Cervical back: Normal range of motion. Skin:     General: Skin is warm and dry.   Neurological:      Mental Status: Alert and oriented to person, place and time.        Result Review :  The following data was reviewed by: SILKE Wiggins on 04/30/2024:  CMP   CMP          4/11/2024    14:27   CMP   Glucose 88    BUN 14    Creatinine 1.58    EGFR 50.1    Sodium 139    Potassium 3.7    Chloride 101    Calcium 9.1    BUN/Creatinine Ratio 8.9    Anion Gap 7.0      Lipid Panel   Lipid Panel          4/11/2024    14:27   Lipid Panel   Total Cholesterol 82    Triglycerides 96    HDL Cholesterol 35    VLDL Cholesterol 19    LDL Cholesterol  28    LDL/HDL Ratio 0.79      BMP   BMP          4/11/2024    14:27   BMP   BUN 14    Creatinine 1.58    Sodium 139    Potassium 3.7    Chloride 101    CO2 31.0    Calcium 9.1      Data reviewed : Cardiology studies echo      Results for orders placed during the hospital encounter of 04/11/24    Adult Stress Echo W/ Cont or Stress Agent if Necessary Per Protocol    Interpretation Summary    Low risk study for stress-induced myocardial ischemia    There is no significant echocardiographic evidence of ischemia.    Left ventricular systolic function is normal.         Assessment and Plan   Diagnoses and all orders for this visit:    1. Chest pain, unspecified type (Primary)  2. Palpitations  3. Shortness of breath  4. Essential hypertension  5. Hyperlipidemia, unspecified hyperlipidemia type  -Patient hypertensive in the clinic and with his atypical chest discomfort and palpitations I  believe is reasonable to increase beta-blocker therapy to see if this will improve these symptoms  - Increase Toprol-XL to 50 mg daily  -Lipids well-controlled, continue Crestor 10 mg daily  - Patient does have questionable stroke history but has had intolerance to aspirin in the past.  Given his low risk tress test I will not introduce Plavix at this time but will have low threshold for doing this in the future  -Continue regimen of amlodipine 5 mg daily, indapamide 2.5 mg daily               Follow Up   No follow-ups on file.  Patient was given instructions and counseling regarding his condition or for health maintenance advice. Please see specific information pulled into the AVS if appropriate.       Part of this note may be an electronic transcription/translation of spoken language to printed text using the Dragon Dictation System.

## 2024-05-01 ASSESSMENT — ENCOUNTER SYMPTOMS
BLOOD IN STOOL: 0
ANAL BLEEDING: 0
ABDOMINAL PAIN: 0
TROUBLE SWALLOWING: 1
COUGH: 0
ABDOMINAL DISTENTION: 0
NAUSEA: 0
RECTAL PAIN: 0
SHORTNESS OF BREATH: 0
DIARRHEA: 0
VOMITING: 0
CONSTIPATION: 0
CHOKING: 0

## 2024-05-06 ENCOUNTER — APPOINTMENT (OUTPATIENT)
Dept: OPERATING ROOM | Age: 59
End: 2024-05-06
Attending: INTERNAL MEDICINE

## 2024-05-06 ENCOUNTER — HOSPITAL ENCOUNTER (OUTPATIENT)
Age: 59
Setting detail: SPECIMEN
Discharge: HOME | End: 2024-05-06
Payer: MEDICARE

## 2024-05-06 ENCOUNTER — ANESTHESIA EVENT (OUTPATIENT)
Dept: OPERATING ROOM | Age: 59
End: 2024-05-06

## 2024-05-06 ENCOUNTER — HOSPITAL ENCOUNTER (OUTPATIENT)
Age: 59
Setting detail: OUTPATIENT SURGERY
Discharge: HOME | End: 2024-05-06
Attending: INTERNAL MEDICINE | Admitting: INTERNAL MEDICINE

## 2024-05-06 ENCOUNTER — ANESTHESIA (OUTPATIENT)
Dept: OPERATING ROOM | Age: 59
End: 2024-05-06

## 2024-05-06 VITALS
WEIGHT: 180 LBS | DIASTOLIC BLOOD PRESSURE: 76 MMHG | HEIGHT: 67 IN | OXYGEN SATURATION: 96 % | RESPIRATION RATE: 18 BRPM | TEMPERATURE: 97.7 F | HEART RATE: 68 BPM | BODY MASS INDEX: 28.25 KG/M2 | SYSTOLIC BLOOD PRESSURE: 125 MMHG

## 2024-05-06 PROCEDURE — G8907 PT DOC NO EVENTS ON DISCHARG: HCPCS

## 2024-05-06 PROCEDURE — 88305 TISSUE EXAM BY PATHOLOGIST: CPT

## 2024-05-06 PROCEDURE — 43239 EGD BIOPSY SINGLE/MULTIPLE: CPT

## 2024-05-06 PROCEDURE — 88342 IMHCHEM/IMCYTCHM 1ST ANTB: CPT

## 2024-05-06 PROCEDURE — G8918 PT W/O PREOP ORDER IV AB PRO: HCPCS

## 2024-05-06 RX ORDER — SUCRALFATE ORAL 1 G/10ML
1 SUSPENSION ORAL 3 TIMES DAILY
Qty: 1200 ML | Refills: 3 | Status: SHIPPED | OUTPATIENT
Start: 2024-05-06

## 2024-05-06 RX ORDER — PROPOFOL 10 MG/ML
INJECTION, EMULSION INTRAVENOUS PRN
Status: DISCONTINUED | OUTPATIENT
Start: 2024-05-06 | End: 2024-05-06 | Stop reason: SDUPTHER

## 2024-05-06 RX ORDER — LIDOCAINE HYDROCHLORIDE 10 MG/ML
INJECTION, SOLUTION EPIDURAL; INFILTRATION; INTRACAUDAL; PERINEURAL PRN
Status: DISCONTINUED | OUTPATIENT
Start: 2024-05-06 | End: 2024-05-06 | Stop reason: SDUPTHER

## 2024-05-06 RX ORDER — SODIUM CHLORIDE, SODIUM LACTATE, POTASSIUM CHLORIDE, CALCIUM CHLORIDE 600; 310; 30; 20 MG/100ML; MG/100ML; MG/100ML; MG/100ML
INJECTION, SOLUTION INTRAVENOUS CONTINUOUS
Status: DISCONTINUED | OUTPATIENT
Start: 2024-05-06 | End: 2024-05-06 | Stop reason: HOSPADM

## 2024-05-06 RX ADMIN — PROPOFOL 200 MG: 10 INJECTION, EMULSION INTRAVENOUS at 13:49

## 2024-05-06 RX ADMIN — SODIUM CHLORIDE, SODIUM LACTATE, POTASSIUM CHLORIDE, CALCIUM CHLORIDE: 600; 310; 30; 20 INJECTION, SOLUTION INTRAVENOUS at 13:21

## 2024-05-06 RX ADMIN — LIDOCAINE HYDROCHLORIDE 30 MG: 10 INJECTION, SOLUTION EPIDURAL; INFILTRATION; INTRACAUDAL; PERINEURAL at 13:49

## 2024-05-06 ASSESSMENT — PAIN - FUNCTIONAL ASSESSMENT: PAIN_FUNCTIONAL_ASSESSMENT: NONE - DENIES PAIN

## 2024-05-06 NOTE — ANESTHESIA PRE PROCEDURE
Department of Anesthesiology  Preprocedure Note       Name:  Gennaro Hatfield   Age:  58 y.o.  :  1965                                          MRN:  [id number]         Date:  2024      Surgeon: Surgeon(s):  García Caceres MD    Procedure: Procedure(s):  ESOPHAGOGASTRODUODENOSCOPY BIOPSY    Medications prior to admission:   Prior to Admission medications    Medication Sig Start Date End Date Taking? Authorizing Provider   hydrocortisone 2.5 % cream Apply topically 2 times daily. 24   Vani Richard APRN   omeprazole (PRILOSEC) 40 MG delayed release capsule Take 1 capsule by mouth 2 times daily 24   Vani Richard APRN   tiZANidine (ZANAFLEX) 4 MG tablet TAKE ONE TABLET BY MOUTH at bedtime 24   Sadaf Wood APRN - CNP   busPIRone (BUSPAR) 10 MG tablet Take 1 tablet by mouth 3 times daily 24  Ana Garcia APRN - CNP   desvenlafaxine succinate (PRISTIQ) 25 MG TB24 extended release tablet Take 2 tablets by mouth daily 3/22/24   Ana Garcia APRN - CNP   metoprolol succinate (TOPROL XL) 25 MG extended release tablet Take 1 tablet by mouth nightly 24   Eddi Tejeda MD   indapamide (LOZOL) 2.5 MG tablet  23   Eddi Tejeda MD   Ubrogepant (UBRELVY) 100 MG TABS TAKE 1 TABLET BY MOUTH AT ONSET OF MIGRAINE. MAY REPEAT 1 TIME IN 2 HOURS. IF NO IMPROVEMENT. DO NOT EXCEED 2 TABLETS IN 24 HOURS 23   Sadaf Wood APRN - CNP   amLODIPine (NORVASC) 5 MG tablet  23   Eddi Tejeda MD   rosuvastatin (CRESTOR) 10 MG tablet  3/9/23   Eddi Tejeda MD   naproxen (NAPROSYN) 500 MG tablet  23   Eddi Tejeda MD   ergocalciferol (ERGOCALCIFEROL) 1.25 MG (55038 UT) capsule Take 1 capsule by mouth Every other Week 22   Eddi Tejeda MD   testosterone enanthate (DELATESTRYL) 200 MG/ML injection  3/29/23   Provider, MD Eddi   fluticasone-salmeterol (ADVAIR) 250-50 MCG/ACT AEPB diskus

## 2024-05-06 NOTE — DISCHARGE INSTRUCTIONS
RECOMMENDATIONS:    1.  Await path results  2.  Continue PPI  3.  Complete NSAID avoidance  4.  Trial of Carafate three times a day.  5.  Refer to general surgery for hiatal hernia repair.    POST-OP ORDERS: ENDOSCOPY & COLONOSCOPY:    1. Rest today.    2. DO NOT eat or drink until wide awake; eat your usual diet today in moderate amount only.    3. DO NOT drive today.    4. Call physician if you have severe pain, vomiting, fever, rectal bleeding or black bowel movements.    5.  If a biopsy was taken or a polyp removed, you should expect to hear results in about 7-10 days.  If you have heard nothing from your physician by then, call the office for results.    6.  Discharge home when patient awake, vitals signs stable and tolerating liquids.    7. Call with questions or concerns [phone].    NSAIDS (Non-steroidal Anti-Inflammatory)    You have been directed by your physician to avoid any NSAID's; the following medications are a list of those to avoid. If you think that you are taking any NSAID's notify your physician.     Over The Counter    Advil                      Motrin  Nuprin                   Ibuprofen  Midol                     Aleve  Naproxen              Orudis  Aspirin                   [place]    Prescriptions and Generics    Cataflam              Relafen  Voltaren               Clinoril  Indocin                 Naproxen  Arthrotec              Lodine  Daypro                 Nalfon  Toradol                Ansaid  Feldene               Meclofenamate  Fenoprofen          Ponstel  Mobic                   Celebrex  Vioxx

## 2024-05-06 NOTE — ANESTHESIA POSTPROCEDURE EVALUATION
Department of Anesthesiology  Postprocedure Note    Patient: Gennaro Hatfield  MRN: [id number]  YOB: 1965  Date of evaluation: 5/6/2024    Procedure Summary       Date: 05/06/24 Room / Location: [place] [address] / [facility]    Anesthesia Start: 1342 Anesthesia Stop:     Procedure: ESOPHAGOGASTRODUODENOSCOPY BIOPSY (Esophagus) Diagnosis:       Dysphagia, unspecified type      Gastroesophageal reflux disease, unspecified whether esophagitis present      Hiatal hernia      (Dysphagia, unspecified type [R13.10])      (Gastroesophageal reflux disease, unspecified whether esophagitis present [K21.9])      (Hiatal hernia [K44.9])    Surgeons: García Caceres MD Responsible Provider: Sanaz Bob APRN - CRNA    Anesthesia Type: general, TIVA ASA Status: 2            Anesthesia Type: No value filed.    [place] Phase I:      [place] Phase II:      Anesthesia Post Evaluation    Patient location during evaluation: bedside  Patient participation: complete - patient participated  Level of consciousness: sleepy but conscious  Pain score: 0  Airway patency: patent  Nausea & Vomiting: no nausea and no vomiting  Cardiovascular status: blood pressure returned to baseline  Respiratory status: acceptable, room air and spontaneous ventilation  Hydration status: euvolemic  Pain management: adequate    No notable events documented.

## 2024-05-06 NOTE — OP NOTE
Endoscopic Procedure Note    Patient: Gennaro Hatfield : 1965  [facility] Rec#: [id number] Acc#: [id number]     Primary Care Provider Yohan Aguirre DO  Referring Provider: ELIA Leslie    Endoscopist: García Caceres MD    Date of Procedure:  2024    Procedure:   1. EGD with biopsy    Indications:   1. Dysphagia   2. Reflux     Anesthesia:  Sedation was administered by anesthesia who monitored the patient during the procedure.    Estimated Blood Loss: minimal    Procedure:   After reviewing the patient's chart and obtaining informed consent, the patient was placed in the left lateral decubitus position.  A forward-viewing Olympus endoscope was lubricated and inserted through the mouth into the oropharynx. Under direct visualization, the upper esophagus was intubated. The scope was advanced to the level of the third portion of duodenum. Scope was slowly withdrawn with careful inspection of the mucosal surfaces. The scope was retroflexed for inspection of the gastric fundus and incisura. Findings and maneuvers are listed in impression below. The patient tolerated the procedure well. The scope was removed. There were no immediate complications.    Findings:   Esophagus: abnormal: There is a 5 cm hiatal hernia present.  Along the hiatal hernia there is evidence of non-bleeding Luciano's erosions.     Stomach:  Abnormal: there are mucosal changes suggestive of linear gastritis noted -  Gastric biopsies were taken from the antrum and body to rule out Helicobacter pylori infection.      Duodenum: normal      IMPRESSION:  1. Gastritis- biopsied  2. Hiatal hernia      RECOMMENDATIONS:    1.  Await path results  2.  Continue PPI  3.  Complete NSAID avoidance  4.  Trial of Carafate three times a day.  5.  Refer to general surgery for hiatal hernia repair.    The results were discussed with the patient and family.  A copy of the images obtained were given to the patient.     Gloria ROSAS am scribing

## 2024-05-06 NOTE — H&P
Patient Name: Gennaro Hatfield  : 1965  MRN: [id number]  DATE: 24    Allergies:   Allergies   Allergen Reactions    Carisoprodol Anaphylaxis and Other (See Comments)     Night marcano  Unknown     Bacitra-Neomycin-Polymyxin-Hc Hives and Other (See Comments)     Unknown     Flexeril [Cyclobenzaprine] Hives    Gabapentin Other (See Comments)     Nightmares, sleep walking and insomnia  Unknown     Tetracyclines & Related Other (See Comments)     Sun posioning  Unknown     Codeine Other (See Comments)     Unknown     Lithium Other (See Comments)     Unknown     Paroxetine Other (See Comments)     Unknown     Pregabalin Other (See Comments)     Sleeping to much    Unknown     Robaxin [Methocarbamol] Other (See Comments)     nightmares        ENDOSCOPY  History and Physical    Procedure:    [] Diagnostic Colonoscopy       [] Screening Colonoscopy  [x] EGD      [] ERCP      [] EUS       [] Other    [x] Previous office notes/History and Physical reviewed from the patients chart. Please see EMR for further details of HPI. I have examined the patient's status immediately prior to the procedure and:      Indications/HPI:    []Abdominal Pain   []Barretts  []Screening/Surveillance   []History of Polyps  [x]Dysphagia            [] +Cologard/DNA testing  []Abnormal Imaging              []EOE Hx              [] Family Hx of CRC/Polyps  []Anemia                            []Food Impaction       []Recent Poor Prep  []GI Bleed             []Lymphadenopathy  []History of Polyps  []Change in bowel habits [x]Heartburn/Reflux  []Cancer- GI/Lung  []Chest Pain - Non Cardiac []Heme (+) Stool []Ulcers  []Constipation  []Hemoptysis  []Incontinence    []Diarrhea  []Hypoxemia  []Rectal Bleed (BRBPR)  []Nausea/Vomiting   [] Varices  []Crohns/Colitis  []Pancreatic Cyst   [] Cirrhosis   []Pancreatitis    []Abnormal MRCP  []Elevated LFT [] Stent Removal, Previous ERCP  []Other:     Anesthesia:   [x] MAC [] Moderate Sedation   []

## 2024-05-09 ENCOUNTER — TELEPHONE (OUTPATIENT)
Dept: GASTROENTEROLOGY | Age: 59
End: 2024-05-09

## 2024-05-09 NOTE — TELEPHONE ENCOUNTER
Spoke to pt today and he was agreeable for referral. With his insurance I have faxed the referral to [facility]. They will notify pt with an apt.     IMPRESSION:  1. Gastritis- biopsied  2. Hiatal hernia      RECOMMENDATIONS:    1.  Await path results  2.  Continue PPI  3.  Complete NSAID avoidance  4.  Trial of Carafate three times a day.  5.  Refer to general surgery for hiatal hernia repair.       García Caceres MD  5/6/2024

## 2024-05-10 NOTE — TELEPHONE ENCOUNTER
Patient called before I left for the day yesterday stating he can't get to [facility]. Today I talked to him after I got info on [facility]. He stated he doesn't qualify with his medicaid. There is someone at his PCP's office that can get him to the [facility], but can't bring him home under anesthesia. I encouraged patient to do the consult ov and see how long the recovery is. If he is admitted, he wouldn't be under anesthesia when he goes home. He will let me know what he finds out about it.

## 2024-05-10 NOTE — RESULT ENCOUNTER NOTE
Reviewed and agree, will you please ask the patient if they want the referral for Dr. Ratliff at [facility] or if they would like the referral made for somewhere else

## 2024-05-17 ENCOUNTER — TELEPHONE (OUTPATIENT)
Dept: PSYCHIATRY | Age: 59
End: 2024-05-17

## 2024-05-17 NOTE — TELEPHONE ENCOUNTER
Pt called needing to get a sooner appt with Ana Garcia    Scheduled for a phone visit for 05/22/24 @ 1:00.    Electronically signed by Juliet Boyce MA on 5/17/2024 at 10:42 AM

## 2024-05-21 ENCOUNTER — TELEPHONE (OUTPATIENT)
Dept: PSYCHIATRY | Age: 59
End: 2024-05-21

## 2024-05-21 NOTE — TELEPHONE ENCOUNTER
Called pt on 05/21/24 for appointment reminder for 05/22/24. Pt confirmed   ?   Reminded patient to complete their visit pre-check/digital registration in [employer].

## 2024-05-22 ENCOUNTER — TELEPHONE (OUTPATIENT)
Dept: PSYCHIATRY | Age: 59
End: 2024-05-22

## 2024-05-22 ENCOUNTER — SCHEDULED TELEPHONE ENCOUNTER (OUTPATIENT)
Dept: PSYCHIATRY | Age: 59
End: 2024-05-22
Payer: MEDICARE

## 2024-05-22 DIAGNOSIS — G47.01 INSOMNIA DUE TO MEDICAL CONDITION: ICD-10-CM

## 2024-05-22 DIAGNOSIS — F41.0 GENERALIZED ANXIETY DISORDER WITH PANIC ATTACKS: ICD-10-CM

## 2024-05-22 DIAGNOSIS — F43.12 CHRONIC POST-TRAUMATIC STRESS DISORDER: ICD-10-CM

## 2024-05-22 DIAGNOSIS — F34.9 PERSISTENT MOOD (AFFECTIVE) DISORDER, UNSPECIFIED (HCC): Primary | ICD-10-CM

## 2024-05-22 DIAGNOSIS — F41.1 GENERALIZED ANXIETY DISORDER WITH PANIC ATTACKS: ICD-10-CM

## 2024-05-22 PROCEDURE — 99443 PR PHYS/QHP TELEPHONE EVALUATION 21-30 MIN: CPT

## 2024-05-22 RX ORDER — DESVENLAFAXINE 25 MG/1
50 TABLET, EXTENDED RELEASE ORAL DAILY
Qty: 60 TABLET | Refills: 2 | Status: SHIPPED | OUTPATIENT
Start: 2024-05-22

## 2024-05-22 NOTE — TELEPHONE ENCOUNTER
Pt was called for virtual appointment check in.    Electronically signed by Juliet Boyce MA on 5/22/2024 at 1:06 PM

## 2024-05-22 NOTE — PROGRESS NOTES
Type:   Eval and Treat     Referral Reason:   Specialty Services Required     Requested Specialty:   Clinical Counselor     Number of Visits Requested:   1       9. Additional comments: Continue therapy, discussed sleep hygiene, discussed the use of coping skills and relaxation strategies to manage symptoms.         10.Over 50% of the total visit time of   30  minutes was spent on counseling and/or coordination of care of:                        1. Persistent mood (affective) disorder, unspecified (HCC)    2. Generalized anxiety disorder with panic attacks    3. Chronic post-traumatic stress disorder    4. Insomnia due to medical condition                            Psychotherapy Topics: mood/medication effectiveness financial and health    Ana Garcia, APRN - CNP    This dictation was generated by voice recognition computer software.  Although all attempts are made to edit the dictation for accuracy, there may be errors in the transcription that are not intended.

## 2024-05-27 ENCOUNTER — TELEPHONE (OUTPATIENT)
Dept: PSYCHIATRY | Age: 59
End: 2024-05-27

## 2024-05-27 NOTE — TELEPHONE ENCOUNTER
Sent Referral to [facility] for therapy.  Pt prefers a female therapist.    Electronically signed by Juliet Boyce MA on 5/27/2024 at 11:37 AM

## 2024-05-27 NOTE — TELEPHONE ENCOUNTER
MA called [facility] on a referral that our office sent to them.    Pt is scheduled for 05/28/24 @ 11:30.    Provider Notified    Electronically signed by Juliet Boyce MA on 5/27/2024 at 11:38 AM

## 2024-05-29 DIAGNOSIS — F41.1 GENERALIZED ANXIETY DISORDER WITH PANIC ATTACKS: ICD-10-CM

## 2024-05-29 DIAGNOSIS — F41.0 GENERALIZED ANXIETY DISORDER WITH PANIC ATTACKS: ICD-10-CM

## 2024-05-30 ENCOUNTER — TELEPHONE (OUTPATIENT)
Dept: PSYCHIATRY | Age: 59
End: 2024-05-30

## 2024-05-30 DIAGNOSIS — F41.9 ANXIETY DISORDER, UNSPECIFIED TYPE: Primary | ICD-10-CM

## 2024-05-30 RX ORDER — BUSPIRONE HYDROCHLORIDE 10 MG/1
10 TABLET ORAL 3 TIMES DAILY
Qty: 90 TABLET | Refills: 2 | Status: SHIPPED | OUTPATIENT
Start: 2024-05-30 | End: 2024-08-28

## 2024-05-30 RX ORDER — ASENAPINE MALEATE 2.5 MG/1
2.5 TABLET SUBLINGUAL 2 TIMES DAILY PRN
Qty: 60 TABLET | Refills: 0 | Status: SHIPPED | OUTPATIENT
Start: 2024-05-30

## 2024-05-30 NOTE — TELEPHONE ENCOUNTER
Called and let pt know that his script for buspar was sent to his pharmacy     Electronically signed by Jose R Rondon on 5/30/2024 at 3:05 PM

## 2024-05-30 NOTE — TELEPHONE ENCOUNTER
Per direction of SIN RODRIGEZ, pt contacted and informed that a RX for Asenapine (Saphris) 2.5 mg place one tablet under the tongue up to 2 times a day as needed for anxiety/agitation had been sent to his pharmacy.  Pt informed that the APRN said she received the EKG and ok to start the above med.  He was informed that the med may cause drowsiness so do not take before driving until he knows how it will affect him.  Pt verbalized understanding of the above info and plans to follow.

## 2024-05-30 NOTE — TELEPHONE ENCOUNTER
Per direction of SIN RODRIGEZ, contacted the pt-he reported that he is taking the Buspirone. He was informed that the APRN said she could send in a RX for Saphris-some med education provided-pt agreeable.  Informed pt that she would need to get a copy of his EKG from [facility] to check QTC before starting the med-pt agreeable-spoke with Maida at Dr Monique's office who said she would fax the EKG to this office.  Pt informed that he would be called when the new RX is sent in.  He stated that he has started seeing a therapist at [facility].  Pt verbalized understanding of the above info and plans to follow.

## 2024-05-30 NOTE — TELEPHONE ENCOUNTER
Pt called stating that [facility] and was having problems about his service dog.  \"They wanted papers.  I don't have papers and now I am having to wait outside under a tree until my ride can get here\".  Pt stated that he was having a panic attack.  He was scattered at times during the phone call.  He wanted to know if she could take an extra Pristiq.  Informed pt that is not a med that works immediately.  She stated that he is also on Buspirone.  Pt became calmer with the distraction of the phone call.  Encouraged pt to focus on deep breathing and try calming self talk.  \"I am trying to\". Pt was informed that SIN Garcia ELIA was not in the office, but will be here soon and will give her the above info.  Pt would like to see if he can get a RX for a med that he can take if he has a panic attack again in the future.  Pt informed that he would be called back today with the APRN response.

## 2024-06-07 ENCOUNTER — TELEPHONE (OUTPATIENT)
Dept: PSYCHIATRY | Age: 59
End: 2024-06-07

## 2024-06-07 NOTE — TELEPHONE ENCOUNTER
Per direction of SIN RODRIGEZ, pt called and informed that he can take the Doxepin 25 mg one every bedtime.  Pt stated that he is using the CPAP when he is able to sleep.  He was encouraged to use the CPAP.  Pt informed that the APRN wants him to go to ER if has suicidal thoughts, homicidal thoughts or A/V hallucinations or continues unable to sleep-pt stated that he would go to ER if needed.  \"I don't see that being needed\". Pt stated that he is having the same hallucinations that he always has of someone in the kitchen, hearing stuff-sometimes in a foreign language-\"saying just nonsense\".  Pt denies any voices telling him to harm him self or others.  Pt stated that he was also having \"bad tics\".  He stated that in the past he had an episode of sleep issues like this for about 3 weeks and was placed on Risperdal-he described the Risperdal making him worse.  Pt again encouraged to go to ER if gets worse and he is agreeable.  He was encouraged to call back here as needed.

## 2024-06-07 NOTE — TELEPHONE ENCOUNTER
Pt called requesting to speak with SIN Garcia ELIA. He was informed that she was with a pt.  He stated that he has been up for 72 hrs.   \"I don't know why I am wide awake\".  He stated he was going from \"giddy to grumpy\".  He stated he did not know if he needed to take the Doxepin 25 mg or Prazosin 1 mg he had left from taking in the past.  He stated he was taken off of the meds because he was falling down the stairs taking his senior dog out at night.  He was informed that SIN Garcia ELIA would be given the above info and he would be called back today.

## 2024-06-17 ENCOUNTER — TELEPHONE (OUTPATIENT)
Dept: NEUROLOGY | Age: 59
End: 2024-06-17

## 2024-06-17 NOTE — TELEPHONE ENCOUNTER
Called and spoke with patient. I advised him of BW note seen below he voiced his understanding. I scheduled patient with soonest appointment available and patient was agreeable to time and date.

## 2024-06-21 ENCOUNTER — TELEPHONE (OUTPATIENT)
Dept: PSYCHIATRY | Age: 59
End: 2024-06-21

## 2024-06-21 NOTE — TELEPHONE ENCOUNTER
Called pt on 06/21/24 for appointment reminder for 06/24/24. Pt confirmed   ?   Reminded patient to complete their visit pre-check/digital registration in [employer].

## 2024-06-23 ENCOUNTER — TELEPHONE (OUTPATIENT)
Dept: NEUROLOGY | Age: 59
End: 2024-06-23

## 2024-06-24 ENCOUNTER — TELEPHONE (OUTPATIENT)
Dept: PSYCHIATRY | Age: 59
End: 2024-06-24

## 2024-06-24 ENCOUNTER — SCHEDULED TELEPHONE ENCOUNTER (OUTPATIENT)
Dept: PSYCHIATRY | Age: 59
End: 2024-06-24
Payer: MEDICARE

## 2024-06-24 DIAGNOSIS — F34.9 PERSISTENT MOOD (AFFECTIVE) DISORDER, UNSPECIFIED (HCC): Primary | ICD-10-CM

## 2024-06-24 DIAGNOSIS — F41.0 GENERALIZED ANXIETY DISORDER WITH PANIC ATTACKS: ICD-10-CM

## 2024-06-24 DIAGNOSIS — F41.1 GENERALIZED ANXIETY DISORDER WITH PANIC ATTACKS: ICD-10-CM

## 2024-06-24 DIAGNOSIS — F43.12 CHRONIC POST-TRAUMATIC STRESS DISORDER: ICD-10-CM

## 2024-06-24 DIAGNOSIS — G47.01 INSOMNIA DUE TO MEDICAL CONDITION: ICD-10-CM

## 2024-06-24 PROCEDURE — 99443 PR PHYS/QHP TELEPHONE EVALUATION 21-30 MIN: CPT

## 2024-06-24 ASSESSMENT — PATIENT HEALTH QUESTIONNAIRE - PHQ9
SUM OF ALL RESPONSES TO PHQ QUESTIONS 1-9: 12
6. FEELING BAD ABOUT YOURSELF - OR THAT YOU ARE A FAILURE OR HAVE LET YOURSELF OR YOUR FAMILY DOWN: SEVERAL DAYS
SUM OF ALL RESPONSES TO PHQ QUESTIONS 1-9: 12
8. MOVING OR SPEAKING SO SLOWLY THAT OTHER PEOPLE COULD HAVE NOTICED. OR THE OPPOSITE, BEING SO FIGETY OR RESTLESS THAT YOU HAVE BEEN MOVING AROUND A LOT MORE THAN USUAL: SEVERAL DAYS
SUM OF ALL RESPONSES TO PHQ9 QUESTIONS 1 & 2: 3
3. TROUBLE FALLING OR STAYING ASLEEP: SEVERAL DAYS
SUM OF ALL RESPONSES TO PHQ QUESTIONS 1-9: 12
9. THOUGHTS THAT YOU WOULD BE BETTER OFF DEAD, OR OF HURTING YOURSELF: NOT AT ALL
10. IF YOU CHECKED OFF ANY PROBLEMS, HOW DIFFICULT HAVE THESE PROBLEMS MADE IT FOR YOU TO DO YOUR WORK, TAKE CARE OF THINGS AT HOME, OR GET ALONG WITH OTHER PEOPLE: SOMEWHAT DIFFICULT
1. LITTLE INTEREST OR PLEASURE IN DOING THINGS: MORE THAN HALF THE DAYS
5. POOR APPETITE OR OVEREATING: NEARLY EVERY DAY
2. FEELING DOWN, DEPRESSED OR HOPELESS: SEVERAL DAYS
4. FEELING TIRED OR HAVING LITTLE ENERGY: MORE THAN HALF THE DAYS
SUM OF ALL RESPONSES TO PHQ QUESTIONS 1-9: 12

## 2024-06-24 ASSESSMENT — COLUMBIA-SUICIDE SEVERITY RATING SCALE - C-SSRS
6. HAVE YOU EVER DONE ANYTHING, STARTED TO DO ANYTHING, OR PREPARED TO DO ANYTHING TO END YOUR LIFE?: NO
2. HAVE YOU ACTUALLY HAD ANY THOUGHTS OF KILLING YOURSELF?: NO
1. WITHIN THE PAST MONTH, HAVE YOU WISHED YOU WERE DEAD OR WISHED YOU COULD GO TO SLEEP AND NOT WAKE UP?: NO

## 2024-06-24 NOTE — PROGRESS NOTES
Gennaro Hatfield is a 58 y.o. male evaluated via telephone on 6/24/2024 for Medication Check  .        Documentation:  I communicated with the patient and/or health care decision maker about see below.   Details of this discussion including any medical advice provided: see below    Total Time: minutes: 21-30 minutes    Gennaro Hatfield was evaluated through a synchronous (real-time) audio encounter. Patient identification was verified at the start of the visit. He (or guardian if applicable) is aware that this is a billable service, which includes applicable co-pays. This visit was conducted with the patient's (and/or legal guardian's) verbal consent. He has not had a related appointment within my department in the past 7 days or scheduled within the next 24 hours.   The patient was located at Home: [address] [place] [address]  [address]  [facility] [address].  The provider was located at Facility (Appt Dept): [facility].  [address]  [place] [address].    Note: not billable if this call serves to triage the patient into an appointment for the relevant concern    6/24/24        Progress Note    Gennaro Hatfield 1965      Chief Complaint   Patient presents with    Medication Check       Goes by [place]  Subjective:    Patient is a 58 y.o. male diagnosed with persistent mood disorder, anxiety, PTSD, tic disorder, insomnia and presents today for follow-up.  Last seen in clinic on 5/22/24 and prior records were reviewed.    Telehealth visit. Has been doing okay, sleeping a little better. Added doxepin back a couple weeks ago when patient wasn't sleeping. Prescribed saphris to help with anxiety, has not initiated, however was started on Reglan by TA, informed to hold Saphris for now, patient agreeable. Started with new therapist at [facility], reports it has gone well so far. Reports compliance with medications.  Reports he met with pain management, does suffer from chronic pain, they are going to start lidocaine

## 2024-06-24 NOTE — TELEPHONE ENCOUNTER
Pt was called for virtual appointment check in.    Electronically signed by Juliet Boyce MA on 6/24/2024 at 1:58 PM

## 2024-07-11 ENCOUNTER — TELEPHONE (OUTPATIENT)
Dept: PSYCHIATRY | Age: 59
End: 2024-07-11

## 2024-07-11 ENCOUNTER — SCHEDULED TELEPHONE ENCOUNTER (OUTPATIENT)
Dept: PSYCHIATRY | Age: 59
End: 2024-07-11
Payer: MEDICARE

## 2024-07-11 DIAGNOSIS — F41.1 GENERALIZED ANXIETY DISORDER WITH PANIC ATTACKS: ICD-10-CM

## 2024-07-11 DIAGNOSIS — G47.01 INSOMNIA DUE TO MEDICAL CONDITION: ICD-10-CM

## 2024-07-11 DIAGNOSIS — F34.9 PERSISTENT MOOD (AFFECTIVE) DISORDER, UNSPECIFIED (HCC): Primary | ICD-10-CM

## 2024-07-11 DIAGNOSIS — F41.0 GENERALIZED ANXIETY DISORDER WITH PANIC ATTACKS: ICD-10-CM

## 2024-07-11 DIAGNOSIS — F43.12 CHRONIC POST-TRAUMATIC STRESS DISORDER: ICD-10-CM

## 2024-07-11 PROCEDURE — 99443 PR PHYS/QHP TELEPHONE EVALUATION 21-30 MIN: CPT

## 2024-07-11 NOTE — TELEPHONE ENCOUNTER
Pt was called for virtual appointment check in.    Electronically signed by Juliet Boyce MA on 7/11/2024 at 10:53 AM

## 2024-07-11 NOTE — PROGRESS NOTES
for: \"TRIG\"  No results found for: \"HDL\"  No components found for: \"LDLCHOLESTEROL\", \"LDLCALC\"  No results found for: \"VLDL\"  No results found for: \"CHOLHDLRATIO\"  No results found for: \"LABA1C\"  No results found for: \"EAG\"  No results found for: \"TSHFT4\", \"TSH\"  No results found for: \"VITD25\"  No results found for: \"BTBXKKDU39\"   No results found for: \"FOLATE\"     Assessment:   1. Persistent mood (affective) disorder, unspecified (HCC)    2. Generalized anxiety disorder with panic attacks    3. Chronic post-traumatic stress disorder    4. Insomnia due to medical condition                  No evidence of acute suicidality, homicidality or psychosis observed.  Patient is psychiatrically stable    Plan:    1.   Continue   Pristiq 50 mg daily for depression/anxiety/panic  Buspirone 10 mg 3 times daily for anxiety  Saphris 2.5 mg BID PRN for anxiety, agitation  Doxepin 25 mg nightly for sleep  Therapy with Jossy russo [facility]         The risks, benefits, side effects, indications, contraindications, and adverse effects of the medications have been discussed. Yes.  2. The pt has verbalized understanding and has capacity to give informed consent.  3. The [place] report has been reviewed according to HB1 regulations.  4. Supportive therapy offered.  5. Follow up: Return in about 6 weeks (around 8/22/2024) for Medication Follow up.  6. The patient has been advised to call with any problems.  7. Controlled substance Treatment Plan: n/a.  8. The above listed medications have been continued, modifications in meds and other orders/labs as follows:      No orders of the defined types were placed in this encounter.       No orders of the defined types were placed in this encounter.      9. Additional comments: Continue therapy, discussed sleep hygiene, discussed the use of coping skills and relaxation strategies to manage symptoms.         10.Over 50% of the total visit time of   30  minutes was spent on counseling and/or

## 2024-07-15 DIAGNOSIS — M54.50 LUMBAR PAIN: ICD-10-CM

## 2024-07-15 RX ORDER — TIZANIDINE 4 MG/1
4 TABLET ORAL NIGHTLY
Qty: 30 TABLET | Refills: 2 | Status: SHIPPED | OUTPATIENT
Start: 2024-07-15 | End: 2024-07-18 | Stop reason: SDUPTHER

## 2024-07-15 NOTE — TELEPHONE ENCOUNTER
Requested Prescriptions     Pending Prescriptions Disp Refills    tiZANidine (ZANAFLEX) 4 MG tablet [Pharmacy Med Name: tizanidine 4 mg tablet] 30 tablet 2     Sig: TAKE ONE TABLET BY MOUTH at bedtime       Last Office Visit: 7/19/2023  Next Office Visit: 7/18/2024  Last Medication Refill: 4/22/2024 with 2 RF

## 2024-07-18 ENCOUNTER — OFFICE VISIT (OUTPATIENT)
Dept: NEUROLOGY | Age: 59
End: 2024-07-18
Payer: MEDICARE

## 2024-07-18 VITALS
OXYGEN SATURATION: 98 % | HEIGHT: 67 IN | WEIGHT: 180 LBS | DIASTOLIC BLOOD PRESSURE: 78 MMHG | SYSTOLIC BLOOD PRESSURE: 124 MMHG | BODY MASS INDEX: 28.25 KG/M2 | HEART RATE: 73 BPM

## 2024-07-18 DIAGNOSIS — G43.109 MIGRAINE WITH VISUAL AURA: ICD-10-CM

## 2024-07-18 DIAGNOSIS — M54.50 LUMBAR PAIN: ICD-10-CM

## 2024-07-18 DIAGNOSIS — G25.81 RESTLESS LEGS: Primary | ICD-10-CM

## 2024-07-18 DIAGNOSIS — R40.4 TRANSIENT ALTERATION OF AWARENESS: ICD-10-CM

## 2024-07-18 PROCEDURE — 99214 OFFICE O/P EST MOD 30 MIN: CPT | Performed by: NURSE PRACTITIONER

## 2024-07-18 RX ORDER — UBROGEPANT 100 MG/1
TABLET ORAL
Qty: 10 TABLET | Refills: 3 | Status: SHIPPED | OUTPATIENT
Start: 2024-07-18

## 2024-07-18 RX ORDER — METOPROLOL SUCCINATE 50 MG/1
50 TABLET, EXTENDED RELEASE ORAL NIGHTLY
COMMUNITY
Start: 2024-06-10

## 2024-07-18 RX ORDER — TIZANIDINE 4 MG/1
4 TABLET ORAL NIGHTLY
Qty: 30 TABLET | Refills: 2 | Status: SHIPPED | OUTPATIENT
Start: 2024-07-18

## 2024-07-18 RX ORDER — ROPINIROLE 0.5 MG/1
0.5 TABLET, FILM COATED ORAL 3 TIMES DAILY
Qty: 90 TABLET | Refills: 3 | Status: SHIPPED | OUTPATIENT
Start: 2024-07-18

## 2024-07-18 RX ORDER — LAMOTRIGINE 25 MG/1
TABLET ORAL
Qty: 180 TABLET | Refills: 2 | Status: SHIPPED | OUTPATIENT
Start: 2024-07-18

## 2024-07-18 RX ORDER — DICYCLOMINE HYDROCHLORIDE 10 MG/1
CAPSULE ORAL
COMMUNITY
Start: 2024-05-29

## 2024-07-18 NOTE — PATIENT INSTRUCTIONS
Start Requip (Ropinirole) 0.5 mg nightly for 7 days    Once you have been on Requip 7 days if no issues with side effects then start the Lamictal (Lamotrigine) per titration sheet provided

## 2024-07-18 NOTE — PROGRESS NOTES
REVIEW OF SYSTEMS    Constitutional: []Fever []Sweat []Chills [] Recent Injury [x] Denies all unless marked  HEENT:[]Headache  [] Head Injury/Hearing Loss  [] Sore Throat  [] Ear Ache/Dizziness  [x] Denies all unless marked  Spine:  [] Neck pain  [] Back pain  [] Sciaticia  [x] Denies all unless marked  Cardiovascular:[]Heart Disease []Chest Pain [] Palpitations  [x] Denies all unless marked  Pulmonary: []Shortness of Breath []Cough   [x] Denies all unless marke  Gastrointestinal: []Nausea  []Vomiting  []Abdominal Pain  []Constipation  []Diarrhea  []Dark Bloody Stools  [x] Denies all unless marked  Psychiatric/Behavioral:[] Depression [] Anxiety [x] Denies all unless marked  Genitourinary:   [] Frequency  [] Urgency  [] Incontinence [] Pain with Urination  [x] Denies all unless marked  Extremities: []Pain  []Swelling  [x] Denies all unless marked  Musculoskeletal: [] Muscle Pain  [] Joint Pain  [] Arthritis [] Muscle Cramps [] Muscle Twitches  [x] Denies all unless marked  Sleep: [] Insomnia [] Snoring [] Restless Legs [] Sleep Apnea  [] Daytime Sleepiness  [x] Denies all unless marked  Skin:[] Rash [] Skin Discoloration [x] Denies all unless marked   Neurological: []Visual Disturbance/Memory Loss [x] Loss of Balance [] Slurred Speech/Weakness [] Seizures  [] Vertigo/Dizziness [x] Denies all unless marked

## 2024-07-18 NOTE — PROGRESS NOTES
[facility] NEUROLOGY:    Patient: Gennaro Hatfield   :  1965  Age:  58 y.o.  MRN:  [id number]  Today:  22    Provider: ELIA Rolon    Chief Complaint:  Chief Complaint   Patient presents with    Follow-up     Dicuss medication    Migraine         HISTORY OF PRESENT ILLNESS:   Gennaro Hatfield is a 58 y.o. year old male follow up for migraines, episodes of transient alterations of awareness, and new complaint of olfactory hallucinations.  Migraines are stable, using Ubrelvy as needed.  He is still having random intermittent episodes of altered awareness and losing chunks of time.  Notes new symptom of olfactory hallucinations.  States he does not have a sense of smell following COVID but is randomly smelling things such as barbecue, fire, mangoes. Psychiatrist would like him to discuss this with neurology. He has had previous normal epilepsy work up as below. Does also have auditory and visual hallucinations. Following closely with psych. Is on Buspar and Pristiq.  Mood currently stable.    At past visit discussed continual bilateral lower extremity discomfort, paresthesias/feeling of bugs crawling on skin.  Worse when being still or at night.  Has seen Dr. Aguirre at [facility], felt he has cervical issues causing arm pains. He has bilateral constant arm pain, pain is to biceps/deltoid area. Pain feels like ice to skin. He feels like this got worse around December. Intermittent weakness to hands. He has had MRI lumbar and cervical spine as below, has been seen and cleared through neurosurgery.    Migraines stable, Ubrelvy working well for him. No clear changes to characteristics of headaches. They still occur in clusters. Patient relates that when optic migraine events occur he has flashing lights in his peripheral fields and his visual fields become narrowed. States it feels like he is looking through a tube. Light starts white then into rainbow colors, only in periphery. There is sometimes

## 2024-07-26 ENCOUNTER — TELEPHONE (OUTPATIENT)
Dept: PSYCHIATRY | Age: 59
End: 2024-07-26

## 2024-07-26 DIAGNOSIS — F41.0 GENERALIZED ANXIETY DISORDER WITH PANIC ATTACKS: ICD-10-CM

## 2024-07-26 DIAGNOSIS — F60.9 PERSONALITY DISORDER, UNSPECIFIED (HCC): ICD-10-CM

## 2024-07-26 DIAGNOSIS — G47.01 INSOMNIA DUE TO MEDICAL CONDITION: ICD-10-CM

## 2024-07-26 DIAGNOSIS — F41.1 GENERALIZED ANXIETY DISORDER WITH PANIC ATTACKS: ICD-10-CM

## 2024-07-26 DIAGNOSIS — F43.12 CHRONIC POST-TRAUMATIC STRESS DISORDER: ICD-10-CM

## 2024-07-26 DIAGNOSIS — F95.9 TIC DISORDER: ICD-10-CM

## 2024-07-26 DIAGNOSIS — F34.9 PERSISTENT MOOD (AFFECTIVE) DISORDER, UNSPECIFIED (HCC): Primary | ICD-10-CM

## 2024-07-26 NOTE — TELEPHONE ENCOUNTER
Pt called stating that ever since starting saphris that his mouth and throat has been on fire. His brain is hyper but his body wants to lay down but he can't . Pt also stated that he would like to be referred to another therapist due to the ones that he had at [facility] is not working out.    Above info was giving to Ana RODRIGEZ       Per provider let pt know to stop the medication for a couple of days to see if that would help and to utilize his bupar in  the meantime. MA told pt to call the office with an update. Also let pt know that the office will fax a referral for therapy to [facility]. Pt understood the directions that was giving to him.    Electronically signed by Jose R Rondon on 7/26/2024 at 11:52 AM

## 2024-07-26 NOTE — TELEPHONE ENCOUNTER
Sent Referral to [facility] for therapy.    Electronically signed by Juliet Boyce MA on 7/26/2024 at 2:41 PM

## 2024-07-30 ENCOUNTER — OFFICE VISIT (OUTPATIENT)
Dept: CARDIOLOGY | Facility: CLINIC | Age: 59
End: 2024-07-30
Payer: MEDICARE

## 2024-07-30 VITALS
BODY MASS INDEX: 28.25 KG/M2 | RESPIRATION RATE: 18 BRPM | HEART RATE: 65 BPM | SYSTOLIC BLOOD PRESSURE: 124 MMHG | WEIGHT: 180 LBS | OXYGEN SATURATION: 95 % | DIASTOLIC BLOOD PRESSURE: 82 MMHG | HEIGHT: 67 IN

## 2024-07-30 DIAGNOSIS — E78.5 HYPERLIPIDEMIA, UNSPECIFIED HYPERLIPIDEMIA TYPE: ICD-10-CM

## 2024-07-30 DIAGNOSIS — R00.2 PALPITATIONS: ICD-10-CM

## 2024-07-30 DIAGNOSIS — I10 ESSENTIAL HYPERTENSION: Primary | ICD-10-CM

## 2024-07-30 PROCEDURE — 93000 ELECTROCARDIOGRAM COMPLETE: CPT | Performed by: HOSPITALIST

## 2024-07-30 PROCEDURE — 99214 OFFICE O/P EST MOD 30 MIN: CPT | Performed by: HOSPITALIST

## 2024-07-30 RX ORDER — ROPINIROLE 0.5 MG/1
1 TABLET, FILM COATED ORAL 3 TIMES DAILY
COMMUNITY
Start: 2024-07-18

## 2024-07-30 RX ORDER — SUCRALFATE ORAL 1 G/10ML
SUSPENSION ORAL
COMMUNITY
Start: 2024-05-06

## 2024-07-30 RX ORDER — LAMOTRIGINE 25 MG/1
TABLET ORAL
COMMUNITY
Start: 2024-07-19

## 2024-07-30 RX ORDER — OMEPRAZOLE 40 MG/1
1 CAPSULE, DELAYED RELEASE ORAL EVERY 12 HOURS SCHEDULED
COMMUNITY
Start: 2024-07-18

## 2024-07-30 NOTE — PROGRESS NOTES
Reason For Visit:  Palpitations, hypertension    Subjective        Sean Garcia is a 58 y.o. male with the below pertinent PMH who presents for follow-up of the above issues.    Sean Garcia was most recently seen in cardiology clinic 4/30/2024 at which time he reported doing reasonably well other than continued shortness of breath with exertion that was stable.  He was noted to be hypertensive in clinic; metoprolol was increased to 50 mg daily.    Today, the patient states that he has been feeling much better since his last visit.  He reports that his palpitations resolved.  His BP has been better controlled, and he has noticed improvement in his shortness of breath.  He reports that his activity is more related to back issues now, and he feels that his breathing is back to baseline related to his COPD.  He has not had any significant chest pain, lightheadedness, or peripheral edema.    ROS: Pertinent findings are included above.    Cardiac Studies  Stress echo 4/11/2024: Lower study for stress-induced myocardial ischemia.  No significant echo evidence of ischemia.  LV systolic function is normal.  Cardiac monitor 4/2024: Normal. Sinus rhythm with average sinus rate 78 bpm (sinus range 50 - 142 bpm). No significant arrhythmias, frequent ectopy, high-grade AV block, or pauses detected.     Pertinent PMH  Hypertension  Hyperlipidemia  Sleep apnea  COPD/Asthma  Former Tobacco Use  GERD  Possible stroke history-details unclear  Ménière's disease  Migraines  Schizoaffective disorder    Pertinent past medical, surgical, family, and social history were reviewed.      Current Outpatient Medications:     Advair Diskus 250-50 MCG/DOSE DISKUS, Inhale 1 puff 2 (Two) Times a Day., Disp: , Rfl:     albuterol (PROVENTIL) (2.5 MG/3ML) 0.083% nebulizer solution, Take  by nebulization As Needed., Disp: , Rfl:     amLODIPine (NORVASC) 5 MG tablet, Take 1 tablet by mouth Daily., Disp: , Rfl:     busPIRone (BUSPAR) 10 MG  tablet, Take 1 tablet by mouth 3 times a day., Disp: , Rfl:     cetirizine (zyrTEC) 10 MG tablet, Take 1 tablet by mouth As Needed., Disp: , Rfl:     Desvenlafaxine Succinate ER 25 MG tablet sustained-release 24 hour, Take 1 tablet by mouth Daily., Disp: , Rfl:     famotidine (PEPCID) 40 MG tablet, Take 1 tablet by mouth 2 (Two) Times a Day., Disp: , Rfl:     indapamide (LOZOL) 2.5 MG tablet, Take 1 tablet by mouth., Disp: , Rfl:     lamoTRIgine (LaMICtal) 25 MG tablet, USE 100mg titration PER sheet provide in office, Disp: , Rfl:     meclizine (ANTIVERT) 25 MG tablet, Take 1 tablet by mouth As Needed., Disp: , Rfl:     metoprolol succinate XL (TOPROL-XL) 50 MG 24 hr tablet, Take 1 tablet by mouth every night at bedtime., Disp: 30 tablet, Rfl: 11    omeprazole (priLOSEC) 40 MG capsule, Take 1 capsule by mouth Every 12 (Twelve) Hours., Disp: , Rfl:     ProAir  (90 Base) MCG/ACT inhaler, Inhale 2 puffs As Needed., Disp: , Rfl:     rOPINIRole (REQUIP) 0.5 MG tablet, Take 1 tablet by mouth 3 times a day., Disp: , Rfl:     rosuvastatin (CRESTOR) 10 MG tablet, Take 1 tablet by mouth Daily., Disp: , Rfl:     sucralfate (CARAFATE) 1 GM/10ML suspension, Take 10 mLs by MOUTH THREE times daily. MAY substitute with tablets FOR insurance purposes, Disp: , Rfl:     Testosterone Cypionate (DEPOTESTOTERONE CYPIONATE) 200 MG/ML injection, Inject 1 mL into the appropriate muscle as directed by prescriber 1 (One) Time Per Week., Disp: , Rfl:     tiZANidine (ZANAFLEX) 4 MG tablet, Take 1 tablet by mouth Every Night., Disp: , Rfl:     Ubrelvy 100 MG tablet, Take 1 tablet by mouth As Needed., Disp: , Rfl:     vitamin D (ERGOCALCIFEROL) 1.25 MG (05932 UT) capsule capsule, , Disp: , Rfl:     naproxen (NAPROSYN) 500 MG tablet, Take 1 tablet by mouth As Needed. (Patient not taking: Reported on 7/30/2024), Disp: , Rfl:     pantoprazole (PROTONIX) 40 MG EC tablet, Take 1 tablet by mouth 2 (Two) Times a Day. (Patient not taking: Reported  "on 7/30/2024), Disp: , Rfl:     prazosin (MINIPRESS) 1 MG capsule, 1 capsule Every Night. (Patient not taking: Reported on 7/30/2024), Disp: , Rfl:     risperiDONE (risperDAL) 0.5 MG tablet, Take 2 tablets by mouth 2 (Two) Times a Day. (Patient not taking: Reported on 7/30/2024), Disp: , Rfl:     tamsulosin (FLOMAX) 0.4 MG capsule 24 hr capsule, Take 1 at night (Patient not taking: Reported on 7/30/2024), Disp: , Rfl:      Objective   Vital Signs:  /82   Pulse 65   Resp 18   Ht 170.2 cm (67\")   Wt 81.6 kg (180 lb)   SpO2 95%   BMI 28.19 kg/m²   Estimated body mass index is 28.19 kg/m² as calculated from the following:    Height as of this encounter: 170.2 cm (67\").    Weight as of this encounter: 81.6 kg (180 lb).      Constitutional:       Appearance: Healthy appearance. Not in distress.   Neck:      Vascular: JVD normal.   Pulmonary:      Effort: Pulmonary effort is normal.      Breath sounds: Normal breath sounds.   Cardiovascular:      Normal rate. Regular rhythm.      Murmurs: There is no murmur.      No gallop.  No click. No rub.   Edema:     Peripheral edema absent.   Abdominal:      General: There is no distension.      Palpations: Abdomen is soft.      Tenderness: There is no abdominal tenderness.   Skin:     General: Skin is warm and dry.   Neurological:      Mental Status: Alert and oriented to person, place and time.        Result Review :             ECG 12 Lead    Date/Time: 7/30/2024 3:16 PM  Performed by: Bryant Combs MD    Authorized by: Bryant Combs MD  Comparison: compared with previous ECG from 4/2/2024  Similar to previous ECG  Rhythm: sinus rhythm  Rate: normal  BPM: 65  Conduction: conduction normal  QRS axis: normal    Clinical impression: normal ECG            Assessment and Plan   Diagnoses and all orders for this visit:    1. Essential hypertension (Primary)    2. Hyperlipidemia, unspecified hyperlipidemia type    3. Palpitations    Other orders  -     ECG 12 " Lead      -Now doing quite well without significant symptoms.  BP well-controlled.  - Continue Toprol-XL 50 mg daily, amlodipine 5 mg daily, indapamide 2.5 mg daily, and rosuvastatin 10 mg daily      Follow Up   Return in about 1 year (around 7/30/2025).  Patient was given instructions and counseling regarding his condition or for health maintenance advice. Please see specific information pulled into the AVS if appropriate.       EMR Dragon/Transcription disclaimer: Much of this encounter note is an electronic transcription/translation of spoken language to printed text. The electronic translation of spoken language may permit erroneous, or at times, nonsensical words or phrases to be inadvertently transcribed; although I have reviewed the note for such errors, some may still exist.   Additional Anesthesia Volume In Cc: 3

## 2024-08-01 ENCOUNTER — TELEPHONE (OUTPATIENT)
Dept: NEUROLOGY | Age: 59
End: 2024-08-01

## 2024-08-01 NOTE — TELEPHONE ENCOUNTER
Patient called stating that starting his new medication that at night his fingers, back of his hands and his feet burn and itch and he wants to know if this could be from the medication      Please advise

## 2024-08-26 ENCOUNTER — TELEPHONE (OUTPATIENT)
Dept: PSYCHIATRY | Age: 59
End: 2024-08-26

## 2024-08-26 DIAGNOSIS — F41.1 GENERALIZED ANXIETY DISORDER WITH PANIC ATTACKS: ICD-10-CM

## 2024-08-26 DIAGNOSIS — F41.0 GENERALIZED ANXIETY DISORDER WITH PANIC ATTACKS: ICD-10-CM

## 2024-08-26 RX ORDER — BUSPIRONE HYDROCHLORIDE 10 MG/1
10 TABLET ORAL 3 TIMES DAILY
Qty: 90 TABLET | Refills: 2 | Status: SHIPPED | OUTPATIENT
Start: 2024-08-26 | End: 2024-11-24

## 2024-08-26 NOTE — TELEPHONE ENCOUNTER
Attempted to contact pt to inform refill RX for Buspar sent to pharmacy-call went to recording that said no VM set up.

## 2024-08-26 NOTE — TELEPHONE ENCOUNTER
Pharmacy sent med refill request below.    Last office visit : 7/11/2024 with SIN RODRIGEZ  Next office visit : 8/28/2024 with SIN RODRIGEZ    Requested Prescriptions     Pending Prescriptions Disp Refills    busPIRone (BUSPAR) 10 MG tablet [Pharmacy Med Name: buspirone 10 mg tablet] 90 tablet 2     Sig: Take 1 tablet by mouth 3 times daily            Lexie Casey RN       7/11/24                                         Progress Note     Gennaro Hatfield 1965                       Chief Complaint   Patient presents with    Medication Check         Goes by [place]  Subjective:    Patient is a 58 y.o. male diagnosed with persistent mood disorder, anxiety, PTSD, tic disorder, insomnia and presents today for follow-up.  Last seen in clinic on 6/24/24 and prior records were reviewed.     Telehealth visit. Patient reports \"I'm just frustrated and had a bad day yesterday. My therapist at [facility] left, I really liked her, and her new job doesn't take my insurance. He has been setup with new therapist \"but I don't feel like we connect\". We processed his feelings, supportive psychotherapy provided. He is going to contact [facility] and see about a different therapist. Offered to refer elsewhere but he will let provider knows if wants to to be referred out. Having trouble with his CPAP, encouraged to contact medical supply store and see about different mask, he is receptive. Reports compliance with medications. Was taken off Reglan, did encourage use of Saphris for anxiety, agitation. Denies suicidal ideations, homicidal ideations and hallucinations.  He denies side effects from any of the medications, reports compliance with his Pristiq.  We will follow-up in a couple months.        Reports compliance with medications as good .      Sleep: fair     Caffeine use: rare soda, tea (2.5-3 nathaniel jars)     PREVIOUS MED TRIALS  Pristiq  Doxepin  Prazosin  Risperidone  Wellbutrin  Lithium   Prozac \"makes me feel like a  speech noted.   Mood: \"just frustrated with everything\"   Affect: REBEKAH phone visit  Thought Process: Appears linear, logical and goal oriented. Causality appears intact.   Thought Content: Denies active suicidal and homicidal ideations. No overt delusions or paranoia appreciated.   Perceptions: Denies auditory or visual hallucinations at present time. Not responding to internal stimuli.   Concentration: Intact.   Orientation: to person, place, date, and situation.   Language: Intact.   Fund of information: Intact.   Memory: Recent and remote appear intact.   Impulsivity: Limited.   Neurovegitative: Fair appetite and sleep.   Insight: Fair.   Judgment: Fair.     Cognition: Can spell \"world\" backwards: Yes                    Can do serial 7's: Yes           Lab Results   Component Value Date      10/26/2018     K 4.2 10/26/2018      10/26/2018     CO2 23 10/26/2018     BUN 15 10/26/2018     CREATININE 0.9 10/26/2018     GLUCOSE 107 10/26/2018     CALCIUM 9.1 10/26/2018     BILITOT <0.2 10/26/2018     ALKPHOS 91 10/26/2018     AST 14 10/26/2018     ALT 15 10/26/2018     LABGLOM >60 10/26/2018            Lab Results   Component Value Date/Time      10/26/2018 12:02 AM     K 4.2 10/26/2018 12:02 AM      10/26/2018 12:02 AM     CO2 23 10/26/2018 12:02 AM     BUN 15 10/26/2018 12:02 AM     CREATININE 0.9 10/26/2018 12:02 AM     GLUCOSE 107 10/26/2018 12:02 AM     CALCIUM 9.1 10/26/2018 12:02 AM      No results found for: \"CHOL\"  No results found for: \"TRIG\"  No results found for: \"HDL\"  No components found for: \"LDLCHOLESTEROL\", \"LDLCALC\"  No results found for: \"VLDL\"  No results found for: \"CHOLHDLRATIO\"  No results found for: \"LABA1C\"  No results found for: \"EAG\"  No results found for: \"TSHFT4\", \"TSH\"  No results found for: \"VITD25\"  No results found for: \"ORHWTRRY94\"   No results found for: \"FOLATE\"      Assessment:    1. Persistent mood (affective) disorder, unspecified (HCC)    2. Generalized

## 2024-08-27 ENCOUNTER — TELEPHONE (OUTPATIENT)
Dept: PSYCHIATRY | Age: 59
End: 2024-08-27

## 2024-08-27 NOTE — TELEPHONE ENCOUNTER
Called and confirmed appt with pt for 8/28 @ 2 PM    Electronically signed by Jose R Rondon on 8/27/2024 at 11:34 AM  .

## 2024-08-28 ENCOUNTER — SCHEDULED TELEPHONE ENCOUNTER (OUTPATIENT)
Dept: PSYCHIATRY | Age: 59
End: 2024-08-28
Payer: MEDICARE

## 2024-08-28 ENCOUNTER — TELEPHONE (OUTPATIENT)
Dept: PSYCHIATRY | Age: 59
End: 2024-08-28

## 2024-08-28 DIAGNOSIS — G47.01 INSOMNIA DUE TO MEDICAL CONDITION: ICD-10-CM

## 2024-08-28 DIAGNOSIS — F41.0 GENERALIZED ANXIETY DISORDER WITH PANIC ATTACKS: ICD-10-CM

## 2024-08-28 DIAGNOSIS — F34.9 PERSISTENT MOOD (AFFECTIVE) DISORDER, UNSPECIFIED (HCC): Primary | ICD-10-CM

## 2024-08-28 DIAGNOSIS — F43.12 CHRONIC POST-TRAUMATIC STRESS DISORDER: ICD-10-CM

## 2024-08-28 DIAGNOSIS — F41.1 GENERALIZED ANXIETY DISORDER WITH PANIC ATTACKS: ICD-10-CM

## 2024-08-28 PROCEDURE — 99443 PR PHYS/QHP TELEPHONE EVALUATION 21-30 MIN: CPT

## 2024-08-28 NOTE — PROGRESS NOTES
Gennaro Hatfield is a 59 y.o. male evaluated via telephone on 8/28/2024 for Follow-up  .        Documentation:  I communicated with the patient and/or health care decision maker about see below.   Details of this discussion including any medical advice provided: see below    Total Time: minutes: 21-30 minutes    Gennaro Hatfield was evaluated through a synchronous (real-time) audio encounter. Patient identification was verified at the start of the visit. He (or guardian if applicable) is aware that this is a billable service, which includes applicable co-pays. This visit was conducted with the patient's (and/or legal guardian's) verbal consent. He has not had a related appointment within my department in the past 7 days or scheduled within the next 24 hours.   The patient was located at Home: [address] [place] [address]  [address]  [facility] [address].  The provider was located at Facility (Appt Dept): [facility].  [address]  [place] [address].    Note: not billable if this call serves to triage the patient into an appointment for the relevant concern    8/28/24        Progress Note    Gennaro Hatfield 1965      Chief Complaint   Patient presents with    Follow-up       Goes by [place]  Subjective:    Patient is a 59 y.o. male diagnosed with persistent mood disorder, anxiety, PTSD, tic disorder, insomnia and presents today for follow-up.  Last seen in clinic on 7/11/24 and prior records were reviewed.    Telehealth visit.  Currently taking Pristiq, buspirone, doxepin, Saphris as needed.  Reports compliance with medications, denies any negative unwanted side effects.  Neurology recently added on lamotrigine for possible seizures and Requip for restless legs.  Reports Requip has been very beneficial. Has only just begun titration of lamictal.  Reports he met with orthopedic since last appointment, found out he has golfers elbow and a few bone spurs it is causing a lot of pain.  He states \"he just wanted to do steroid  REBEKAH phone visit   Speech: Normal in tone, volume, and quality. No slurring, dysarthria or pressured speech noted.   Mood: \"panicky\"   Affect: REBEKAH phone visit  Thought Process: Appears linear, logical and goal oriented. Causality appears intact.   Thought Content: Denies active suicidal and homicidal ideations. No overt delusions or paranoia appreciated.   Perceptions: Denies auditory or visual hallucinations at present time. Not responding to internal stimuli.   Concentration: Intact.   Orientation: to person, place, date, and situation.   Language: Intact.   Fund of information: Intact.   Memory: Recent and remote appear intact.   Impulsivity: Limited.   Neurovegitative: Fair appetite and sleep.   Insight: Fair.   Judgment: Fair.    Cognition: Can spell \"world\" backwards: Yes                    Can do serial 7's: Yes    Lab Results   Component Value Date     10/26/2018    K 4.2 10/26/2018     10/26/2018    CO2 23 10/26/2018    BUN 15 10/26/2018    CREATININE 0.9 10/26/2018    GLUCOSE 107 10/26/2018    CALCIUM 9.1 10/26/2018    BILITOT <0.2 10/26/2018    ALKPHOS 91 10/26/2018    AST 14 10/26/2018    ALT 15 10/26/2018    LABGLOM >60 10/26/2018     Lab Results   Component Value Date/Time     10/26/2018 12:02 AM    K 4.2 10/26/2018 12:02 AM     10/26/2018 12:02 AM    CO2 23 10/26/2018 12:02 AM    BUN 15 10/26/2018 12:02 AM    CREATININE 0.9 10/26/2018 12:02 AM    GLUCOSE 107 10/26/2018 12:02 AM    CALCIUM 9.1 10/26/2018 12:02 AM      No results found for: \"CHOL\"  No results found for: \"TRIG\"  No results found for: \"HDL\"  No components found for: \"LDLCHOLESTEROL\", \"LDLCALC\"  No results found for: \"VLDL\"  No results found for: \"CHOLHDLRATIO\"  No results found for: \"LABA1C\"  No results found for: \"EAG\"  No results found for: \"TSHFT4\", \"TSH\"  No results found for: \"VITD25\"  No results found for: \"[id number]\"   No results found for: \"FOLATE\"     Assessment:   1. Persistent mood (affective) disorder,

## 2024-08-28 NOTE — TELEPHONE ENCOUNTER
Pt was called for virtual appointment check in.    Electronically signed by Juliet Boyce MA on 8/28/2024 at 1:44 PM

## 2024-08-30 ENCOUNTER — TELEPHONE (OUTPATIENT)
Dept: PSYCHIATRY | Age: 59
End: 2024-08-30

## 2024-08-30 DIAGNOSIS — F41.0 GENERALIZED ANXIETY DISORDER WITH PANIC ATTACKS: ICD-10-CM

## 2024-08-30 DIAGNOSIS — F41.1 GENERALIZED ANXIETY DISORDER WITH PANIC ATTACKS: ICD-10-CM

## 2024-08-30 RX ORDER — DESVENLAFAXINE 25 MG/1
50 TABLET, EXTENDED RELEASE ORAL DAILY
Qty: 60 TABLET | Refills: 2 | Status: SHIPPED | OUTPATIENT
Start: 2024-08-30

## 2024-08-30 NOTE — TELEPHONE ENCOUNTER
Pharmacy sent med refill request below.      Last office visit : 8/28/2024 with SIN RODRIGEZ  Next office visit : 10/1/2024 with SIN RODRIGEZ    Requested Prescriptions     Pending Prescriptions Disp Refills    desvenlafaxine succinate (PRISTIQ) 25 MG TB24 extended release tablet [Pharmacy Med Name: desvenlafaxine succinate ER 25 mg tablet,extended release 24 hr] 60 tablet 2     Sig: TAKE TWO TABLETS BY MOUTH daily            Lexie Casey RN     8/28/24                                         Progress Note     Gennaro Hatfield 1965                       Chief Complaint   Patient presents with    Follow-up         Goes by [place]  Subjective:    Patient is a 59 y.o. male diagnosed with persistent mood disorder, anxiety, PTSD, tic disorder, insomnia and presents today for follow-up.  Last seen in clinic on 7/11/24 and prior records were reviewed.     Telehealth visit.  Currently taking Pristiq, buspirone, doxepin, Saphris as needed.  Reports compliance with medications, denies any negative unwanted side effects.  Neurology recently added on lamotrigine for possible seizures and Requip for restless legs.  Reports Requip has been very beneficial. Has only just begun titration of lamictal.  Reports he met with orthopedic since last appointment, found out he has golfers elbow and a few bone spurs it is causing a lot of pain.  He states \"he just wanted to do steroid injections and I told him no because I know what those do to your body, he didn't like me\".  He has not been going to therapy, [facility]apping out his therapist and canceling appointment.  We did send referral to [facility] as a feel therapy has been beneficial to patient in the past, however they have tried to reach out to him and have not been able to get a hold of him.  Gave patient contact information to call [facility] to schedule appointment for therapy.  I found a few bone spurs on his neck imaging, reports he feels this is part of his trouble  Informed pt.   auditory or visual hallucinations at present time. Not responding to internal stimuli.   Concentration: Intact.   Orientation: to person, place, date, and situation.   Language: Intact.   Fund of information: Intact.   Memory: Recent and remote appear intact.   Impulsivity: Limited.   Neurovegitative: Fair appetite and sleep.   Insight: Fair.   Judgment: Fair.     Cognition: Can spell \"world\" backwards: Yes                    Can do serial 7's: Yes           Lab Results   Component Value Date      10/26/2018     K 4.2 10/26/2018      10/26/2018     CO2 23 10/26/2018     BUN 15 10/26/2018     CREATININE 0.9 10/26/2018     GLUCOSE 107 10/26/2018     CALCIUM 9.1 10/26/2018     BILITOT <0.2 10/26/2018     ALKPHOS 91 10/26/2018     AST 14 10/26/2018     ALT 15 10/26/2018     LABGLOM >60 10/26/2018            Lab Results   Component Value Date/Time      10/26/2018 12:02 AM     K 4.2 10/26/2018 12:02 AM      10/26/2018 12:02 AM     CO2 23 10/26/2018 12:02 AM     BUN 15 10/26/2018 12:02 AM     CREATININE 0.9 10/26/2018 12:02 AM     GLUCOSE 107 10/26/2018 12:02 AM     CALCIUM 9.1 10/26/2018 12:02 AM      No results found for: \"CHOL\"  No results found for: \"TRIG\"  No results found for: \"HDL\"  No components found for: \"LDLCHOLESTEROL\", \"LDLCALC\"  No results found for: \"VLDL\"  No results found for: \"CHOLHDLRATIO\"  No results found for: \"LABA1C\"  No results found for: \"EAG\"  No results found for: \"TSHFT4\", \"TSH\"  No results found for: \"VITD25\"  No results found for: \"[id number]\"   No results found for: \"FOLATE\"      Assessment:    1. Persistent mood (affective) disorder, unspecified (HCC)    2. Generalized anxiety disorder with panic attacks    3. Chronic post-traumatic stress disorder    4. Insomnia due to medical condition                            No evidence of acute suicidality, homicidality or psychosis observed.  Patient is psychiatrically stable     Plan:     1.   Continue   Pristiq 50 mg daily

## 2024-09-03 ENCOUNTER — TELEPHONE (OUTPATIENT)
Dept: PSYCHIATRY | Age: 59
End: 2024-09-03

## 2024-09-03 NOTE — TELEPHONE ENCOUNTER
Pt called requesting to speak with SIN RODRIGEZ.  He was informed that she was with a pt at this time so he spoke with writer.  He stated that on Friday he woke up to go to the bathroom-was fully awake-was \"woozy (he stated that he did not get up out of bed quickly) started walking down the hallway and \"everything went gray, then quickly went bright white, head turned left, hit shelf on wall that fell on him-was out for a few seconds-woke up lying on the ground on his back with both arms straight in the air with hands almost in a fist and shaking-body was vibrating\".  He stated that he then felt like he had been hit in the head with a \"sledge hammer\".      He stated that a couple of weeks ago he \"felt off, felt heart beating in his throat:.  He stated that at that time his B/P was 160/100 P=120.    He stated that when he was a teenager he \"felt like someone had hit me in the head with a sledge hammer, fell, eyes and face were red and could not get off the ground\".  He stated that he did not seek medical eval at that time.    Pt stated that he sees PAM Mccoy at [facility] for absence seizures.    Pt was informed that SIN RODRIGEZ will be given the info above and he will be called back today.

## 2024-09-03 NOTE — TELEPHONE ENCOUNTER
SIN RODRIGEZ reviewed documentation of the phone call with pt this am.  Per the APRN direction, pt was informed that he needs to get assessed by his PCP or urgent care/ER if lost consciousness. Can also check with neurology. Stressed importance of getting medical eval today.  Pt stated that he did not have transportation.  He is agreeable to contact his doctors and says he has left a message for neurology.  Pt encouraged in the future to seek eval such as ER per ambulance if needed at the time of the occurrence-pt verbalized understanding.

## 2024-09-04 ENCOUNTER — TELEPHONE (OUTPATIENT)
Dept: NEUROLOGY | Age: 59
End: 2024-09-04

## 2024-09-04 NOTE — TELEPHONE ENCOUNTER
Patient called into office leaving a vm that there had been a incident and he wanted a return phone call.      Tried to contact patient no answer at [phone]

## 2024-09-19 RX ORDER — METOPROLOL SUCCINATE 25 MG/1
TABLET, EXTENDED RELEASE ORAL
Qty: 135 TABLET | Refills: 1 | OUTPATIENT
Start: 2024-09-19

## 2024-09-30 ENCOUNTER — TELEPHONE (OUTPATIENT)
Dept: PSYCHIATRY | Age: 59
End: 2024-09-30

## 2024-09-30 NOTE — TELEPHONE ENCOUNTER
SW called pt on 09/30/24 for an appointment reminder for 10/01/24.   ?   -unable to leave , vm not set up yet

## 2024-10-01 ENCOUNTER — TELEPHONE (OUTPATIENT)
Dept: PSYCHIATRY | Age: 59
End: 2024-10-01

## 2024-10-01 ENCOUNTER — SCHEDULED TELEPHONE ENCOUNTER (OUTPATIENT)
Dept: PSYCHIATRY | Age: 59
End: 2024-10-01
Payer: MEDICARE

## 2024-10-01 DIAGNOSIS — G47.01 INSOMNIA DUE TO MEDICAL CONDITION: ICD-10-CM

## 2024-10-01 DIAGNOSIS — F41.0 GENERALIZED ANXIETY DISORDER WITH PANIC ATTACKS: ICD-10-CM

## 2024-10-01 DIAGNOSIS — F43.12 CHRONIC POST-TRAUMATIC STRESS DISORDER: ICD-10-CM

## 2024-10-01 DIAGNOSIS — F41.1 GENERALIZED ANXIETY DISORDER WITH PANIC ATTACKS: ICD-10-CM

## 2024-10-01 DIAGNOSIS — F34.9 PERSISTENT MOOD (AFFECTIVE) DISORDER, UNSPECIFIED (HCC): Primary | ICD-10-CM

## 2024-10-01 PROCEDURE — 99443 PR PHYS/QHP TELEPHONE EVALUATION 21-30 MIN: CPT

## 2024-10-01 NOTE — TELEPHONE ENCOUNTER
Pt was called for virtual appointment check in.    Electronically signed by Juliet Boyce MA on 10/1/2024 at 1:01 PM

## 2024-10-01 NOTE — PROGRESS NOTES
needed 12/3/21   Eddi Tejeda MD   testosterone cypionate (DEPOTESTOTERONE CYPIONATE) 200 MG/ML injection Inject 1 mL into the muscle once a week. 1/21/22   Eddi Tejeda MD   famotidine (PEPCID) 40 MG tablet Take 1 tablet by mouth 2 times daily 2/3/22   Eddi Tejeda MD   meclizine (ANTIVERT) 25 MG tablet Take 1 tablet by mouth as needed 12/17/21   Eddi Tejeda MD   albuterol sulfate HFA (VENTOLIN HFA) 108 (90 Base) MCG/ACT inhaler Inhale 2 puffs into the lungs every 6 hours as needed for Wheezing 10/26/18   Noelle Reid APRN     Social History     Socioeconomic History    Marital status:    Tobacco Use    Smoking status: Former     Current packs/day: 1.00     Types: Cigarettes    Smokeless tobacco: Never   Vaping Use    Vaping status: Never Used   Substance and Sexual Activity    Alcohol use: No    Drug use: No    Sexual activity: Never     Comment: Pt is a transgender     Social Determinants of Health      [facility]    Family and Community Support   Intimate Partner Violence: Not At Risk (9/22/2023)    [facility]    Abuse Screen     Feels Unsafe at Home or Work/School: no     Feels Threatened by Someone: no     Does Anyone Try to Keep You From Having Contact with Others or Doing Things Outside Your Home?: no     Physical Signs of Abuse Present: no    [facility] from [facility], [facility]    Housing Stability       MSE:  Appearance: REBEKAH phone visit  Behavior: REBEKAH phone visit   Speech: Normal in tone, volume, and quality. No slurring, dysarthria or pressured speech noted.   Mood: \"jaw has been locking up causing some panic attacks\"   Affect: REBEKAH phone visit  Thought Process: Appears linear, logical and goal oriented. Causality appears intact.   Thought Content: Denies active suicidal and homicidal ideations. No overt delusions or

## 2024-10-17 ENCOUNTER — OFFICE VISIT (OUTPATIENT)
Dept: NEUROLOGY | Age: 59
End: 2024-10-17
Payer: MEDICARE

## 2024-10-17 VITALS
DIASTOLIC BLOOD PRESSURE: 81 MMHG | HEIGHT: 67 IN | OXYGEN SATURATION: 96 % | BODY MASS INDEX: 28.25 KG/M2 | WEIGHT: 180 LBS | SYSTOLIC BLOOD PRESSURE: 148 MMHG | HEART RATE: 107 BPM

## 2024-10-17 DIAGNOSIS — G25.81 RESTLESS LEGS: Primary | ICD-10-CM

## 2024-10-17 DIAGNOSIS — R55 SYNCOPE AND COLLAPSE: ICD-10-CM

## 2024-10-17 DIAGNOSIS — G43.109 MIGRAINE WITH VISUAL AURA: ICD-10-CM

## 2024-10-17 DIAGNOSIS — Z79.899 MEDICATION MANAGEMENT: ICD-10-CM

## 2024-10-17 DIAGNOSIS — R40.4 TRANSIENT ALTERATION OF AWARENESS: ICD-10-CM

## 2024-10-17 PROCEDURE — 99214 OFFICE O/P EST MOD 30 MIN: CPT | Performed by: NURSE PRACTITIONER

## 2024-10-17 RX ORDER — LAMOTRIGINE 100 MG/1
100 TABLET ORAL DAILY
Qty: 90 TABLET | Refills: 3 | Status: SHIPPED | OUTPATIENT
Start: 2024-10-17

## 2024-10-17 RX ORDER — METOCLOPRAMIDE 5 MG/1
TABLET ORAL
COMMUNITY
Start: 2024-10-07

## 2024-10-17 RX ORDER — ROPINIROLE 1 MG/1
1 TABLET, FILM COATED ORAL 3 TIMES DAILY
Qty: 90 TABLET | Refills: 3 | Status: SHIPPED | OUTPATIENT
Start: 2024-10-17

## 2024-10-17 RX ORDER — POTASSIUM CHLORIDE 1500 MG/1
TABLET, EXTENDED RELEASE ORAL
COMMUNITY
Start: 2024-10-12

## 2024-10-17 NOTE — PROGRESS NOTES
REVIEW OF SYSTEMS    Constitutional: []Fever []Sweats []Chills [] Recent Injury [x] Denies all unless marked  HEENT:[]Headache  [] Head Injury [] Hearing Loss  [] Sore Throat  [] Ear Ache [x] Denies all unless marked  Spine:  [] Neck pain  [] Back pain  [] Sciaticia  [x] Denies all unless marked  Cardiovascular:[]Heart Disease []Palpitations [] Chest Pain   [x] Denies all unless marked  Pulmonary: []Shortness of Breath []Cough   [x] Denies all unless marked  Psychiatric/Behavioral:[] Depression [] Anxiety [x] Denies all unless marked  Gastrointestinal: []Nausea  []Vomiting  []Abdominal Pain  []Constipation  []Diarrhea  [x] Denies all unless marked  Genitourinary:   [] Frequency  [] Urgency  [] Dysuria [] Incontinence  [x] Denies all unless marked  Extremities: []Pain  []Swelling  [x] Denies all unless marked  Musculoskeletal: [] Myalgias  [] Joint Pain  [] Arthritis [] Muscle Cramps [] Muscle Twitches  [x] Denies all unless marked  Sleep: []Insomnia[]Snoring []Restless Legs  []Sleep Apnea  []Daytime Sleepiness  [x] Denies all unless marked  Skin:[] Rash [] Color Change [x] Denies all unless marked   Neurological:[]Visual Disturbance [] Memory Loss []Loss of Balance []Slurred Speech []Weakness []Seizures  [] Dizziness [x] Denies all unless marked      
Colon Polyps Neg Hx        REVIEW OF SYSTEMS:  Constitutional: []Fever []Sweat []Chills [] Recent Injury [x] Denies all unless marked  HEENT:[x]Headache  [] Head Injury/Hearing Loss  [] Sore Throat  [] Ear Ache/Dizziness  [x] Denies all unless marked  Spine:  [x] Neck pain  [x] Back pain  [] Sciaticia  [x] Denies all unless marked  Cardiovascular:[]Heart Disease []Chest Pain [] Palpitations  [x] Denies all unless marked  Pulmonary: []Shortness of Breath []Cough   [x] Denies all unless marke  Gastrointestinal: []Nausea  []Vomiting  []Abdominal Pain  []Constipation  []Diarrhea  []Dark Bloody Stools  [x] Denies all unless marked  Psychiatric/Behavioral:[] Depression [] Anxiety [x] Denies all unless marked  Genitourinary:   [] Frequency  [] Urgency  [] Incontinence [] Pain with Urination  [x] Denies all unless marked  Extremities: [x]Pain  [x]Swelling  [x] Denies all unless marked  Musculoskeletal: [x] Muscle Pain  [] Joint Pain  [x] Arthritis [] Muscle Cramps [] Muscle Twitches  [x] Denies all unless marked  Sleep: [x] Insomnia [] Snoring [] Restless Legs [] Sleep Apnea  [] Daytime Sleepiness  [x] Denies all unless marked  Skin:[] Rash [] Skin Discoloration [x] Denies all unless marked   Neurological: [x]Visual Disturbance/Memory Loss [x] Loss of Balance [] Slurred Speech/Weakness [] Seizures  [x] Vertigo/Dizziness [x] Denies all unless marked    The MA has completed the ROS with the patient. I have reviewed it in its' entirety with the patient and agree with the documentation.      PHYSICAL EXAMINATION:  Vitals: BP (!) 148/81   Pulse (!) 107   Ht 1.702 m (5' 7\")   Wt 81.6 kg (180 lb)   SpO2 96%   BMI 28.19 kg/m²   Constitutional - No acute distress    HEENT- Conjunctiva normal.  No scars, masses, or lesions over external nose or ears, no neck masses noted, no jugular vein distension, no bruit  Cardiac- Tachycardic  Pulmonary- Clear to auscultation, good expansion, normal effort without use of accessory

## 2024-10-30 ENCOUNTER — TELEPHONE (OUTPATIENT)
Dept: PSYCHIATRY | Age: 59
End: 2024-10-30

## 2024-10-30 NOTE — TELEPHONE ENCOUNTER
SW called pt on 10/30/24 for an appointment reminder for 10/31/24. Patient confirmed   ?   Reminded patient to complete their visit pre-check/digital registration in [facility].

## 2024-10-31 ENCOUNTER — SCHEDULED TELEPHONE ENCOUNTER (OUTPATIENT)
Dept: PSYCHIATRY | Age: 59
End: 2024-10-31

## 2024-10-31 ENCOUNTER — TELEPHONE (OUTPATIENT)
Dept: PSYCHIATRY | Age: 59
End: 2024-10-31

## 2024-10-31 DIAGNOSIS — F41.1 GENERALIZED ANXIETY DISORDER WITH PANIC ATTACKS: ICD-10-CM

## 2024-10-31 DIAGNOSIS — F41.0 GENERALIZED ANXIETY DISORDER WITH PANIC ATTACKS: ICD-10-CM

## 2024-10-31 DIAGNOSIS — G47.01 INSOMNIA DUE TO MEDICAL CONDITION: ICD-10-CM

## 2024-10-31 DIAGNOSIS — F34.9 PERSISTENT MOOD (AFFECTIVE) DISORDER, UNSPECIFIED (HCC): Primary | ICD-10-CM

## 2024-10-31 DIAGNOSIS — F43.12 CHRONIC POST-TRAUMATIC STRESS DISORDER: ICD-10-CM

## 2024-10-31 RX ORDER — DESVENLAFAXINE 25 MG/1
50 TABLET, EXTENDED RELEASE ORAL DAILY
Qty: 60 TABLET | Refills: 3 | Status: SHIPPED | OUTPATIENT
Start: 2024-10-31

## 2024-10-31 RX ORDER — BUSPIRONE HYDROCHLORIDE 10 MG/1
10 TABLET ORAL 3 TIMES DAILY
Qty: 90 TABLET | Refills: 3 | Status: SHIPPED | OUTPATIENT
Start: 2024-10-31 | End: 2025-02-28

## 2024-10-31 RX ORDER — ASENAPINE MALEATE 2.5 MG/1
2.5 TABLET SUBLINGUAL 2 TIMES DAILY PRN
Qty: 60 TABLET | Refills: 3 | Status: SHIPPED | OUTPATIENT
Start: 2024-10-31

## 2024-10-31 NOTE — TELEPHONE ENCOUNTER
Pt was called for virtual appointment check in.    Electronically signed by Juliet Boyce MA on 10/31/2024 at 12:59 PM

## 2024-10-31 NOTE — PROGRESS NOTES
psychosis observed.  Patient is psychiatrically stable    Plan:    1.   Continue   Pristiq 50 mg daily for depression/anxiety/panic  Buspirone 10 mg 3 times daily for anxiety  Saphris 2.5 mg BID PRN for anxiety, agitation  Doxepin 25 mg nightly for sleep  Patient to contact [facility] to set up therapy appointment, they have reached out multiple times to schedule appointment        The risks, benefits, side effects, indications, contraindications, and adverse effects of the medications have been discussed. Yes.  2. The pt has verbalized understanding and has capacity to give informed consent.  3. The Bony report has been reviewed according to HB1 regulations.  4. Supportive therapy offered.  5. Follow up: Return in about 3 months (around 1/31/2025) for Medication Follow up (phone) afternoon appt.  6. The patient has been advised to call with any problems.  7. Controlled substance Treatment Plan: n/a.  8. The above listed medications have been continued, modifications in meds and other orders/labs as follows:      Orders Placed This Encounter   Medications    desvenlafaxine succinate (PRISTIQ) 25 MG TB24 extended release tablet     Sig: Take 2 tablets by mouth daily     Dispense:  60 tablet     Refill:  3    asenapine maleate (SAPHRIS) 2.5 MG SUBL sublingual tablet     Sig: Place 1 tablet under the tongue 2 times daily as needed (anxiety/agitation)     Dispense:  60 tablet     Refill:  3    busPIRone (BUSPAR) 10 MG tablet     Sig: Take 1 tablet by mouth 3 times daily     Dispense:  90 tablet     Refill:  3        No orders of the defined types were placed in this encounter.      9. Additional comments: Continue therapy, discussed sleep hygiene, discussed the use of coping skills and relaxation strategies to manage symptoms.         10.Over 50% of the total visit time of   30  minutes was spent on counseling and/or coordination of care of:                        1. Persistent mood (affective) disorder, unspecified

## 2024-11-18 ENCOUNTER — TELEPHONE (OUTPATIENT)
Dept: PSYCHIATRY | Age: 59
End: 2024-11-18

## 2024-11-18 NOTE — TELEPHONE ENCOUNTER
Gloria from [facility] called to provide an update on a referral placed by [facility]. Patient is unable to receive services due to insurance.     SW called patient to inform him that [facility] is unable to see him due to insurance. Patient was verbally agreeable to seeing DOTTIE Stephenson. Patient was scheduled for 01/24/2025 at 11:00am.

## 2024-11-19 ENCOUNTER — HOSPITAL ENCOUNTER (OUTPATIENT)
Dept: PAIN MANAGEMENT | Age: 59
Discharge: HOME | End: 2024-11-19
Payer: MEDICARE

## 2024-11-19 VITALS
WEIGHT: 180 LBS | DIASTOLIC BLOOD PRESSURE: 76 MMHG | OXYGEN SATURATION: 92 % | BODY MASS INDEX: 28.19 KG/M2 | RESPIRATION RATE: 16 BRPM | HEART RATE: 59 BPM | SYSTOLIC BLOOD PRESSURE: 113 MMHG | TEMPERATURE: 96.9 F

## 2024-11-19 DIAGNOSIS — Z71.89 PAIN MANAGEMENT CONTRACT DISCUSSED: ICD-10-CM

## 2024-11-19 DIAGNOSIS — G89.29 CHRONIC BILATERAL LOW BACK PAIN WITH BILATERAL SCIATICA: ICD-10-CM

## 2024-11-19 DIAGNOSIS — Z87.898 HISTORY OF SUBSTANCE USE: ICD-10-CM

## 2024-11-19 DIAGNOSIS — M54.42 CHRONIC BILATERAL LOW BACK PAIN WITH BILATERAL SCIATICA: ICD-10-CM

## 2024-11-19 DIAGNOSIS — M54.41 CHRONIC BILATERAL LOW BACK PAIN WITH BILATERAL SCIATICA: ICD-10-CM

## 2024-11-19 DIAGNOSIS — M47.816 LUMBAR FACET ARTHROPATHY: ICD-10-CM

## 2024-11-19 DIAGNOSIS — Z78.9 FEMALE-TO-MALE TRANSGENDER PERSON: ICD-10-CM

## 2024-11-19 DIAGNOSIS — G89.29 CHRONIC RADICULAR CERVICAL PAIN: ICD-10-CM

## 2024-11-19 DIAGNOSIS — M79.18 MYOFASCIAL MUSCLE PAIN: ICD-10-CM

## 2024-11-19 DIAGNOSIS — M53.3 SACROILIAC JOINT DYSFUNCTION OF BOTH SIDES: Primary | Chronic | ICD-10-CM

## 2024-11-19 DIAGNOSIS — F39 UNSPECIFIED MOOD (AFFECTIVE) DISORDER (HCC): ICD-10-CM

## 2024-11-19 DIAGNOSIS — M54.12 CHRONIC RADICULAR CERVICAL PAIN: ICD-10-CM

## 2024-11-19 DIAGNOSIS — Z02.83 ENCOUNTER FOR DRUG SCREENING: ICD-10-CM

## 2024-11-19 PROCEDURE — 99215 OFFICE O/P EST HI 40 MIN: CPT

## 2024-11-19 ASSESSMENT — ENCOUNTER SYMPTOMS
RESPIRATORY NEGATIVE: 1
GASTROINTESTINAL NEGATIVE: 1
EYES NEGATIVE: 1
BACK PAIN: 1
BOWEL INCONTINENCE: 0

## 2024-11-19 ASSESSMENT — PAIN DESCRIPTION - ONSET: ONSET: ON-GOING

## 2024-11-19 ASSESSMENT — PAIN DESCRIPTION - FREQUENCY: FREQUENCY: INTERMITTENT

## 2024-11-19 ASSESSMENT — PAIN DESCRIPTION - PAIN TYPE: TYPE: CHRONIC PAIN

## 2024-11-19 ASSESSMENT — PAIN SCALES - GENERAL: PAINLEVEL_OUTOF10: 4

## 2024-11-19 ASSESSMENT — PAIN DESCRIPTION - ORIENTATION: ORIENTATION: LOWER;MID

## 2024-11-19 ASSESSMENT — PAIN - FUNCTIONAL ASSESSMENT: PAIN_FUNCTIONAL_ASSESSMENT: PREVENTS OR INTERFERES WITH ALL ACTIVE AND SOME PASSIVE ACTIVITIES

## 2024-11-19 NOTE — PROGRESS NOTES
Clinic Documentation      Education Provided:  [x] Review of Bony  [] Agreement Review  [x] PEG Score Calculated [x] PHQ Score Calculated [x] ORT Score Calculated    [] Compliance Issues Discussed [] Cognitive Behavior Needs [x] Exercise [] Review of Test [] Financial Issues  [x] Tobacco/Alcohol Use Reviewed [x] Teaching [x] New Patient [x] Picture Obtained    Physician Plan:  [] Outgoing Referral  [] Pharmacy Consult  [x] Test Ordered [] Prescription Ordered/Changed   [] Obtained Test Results / Consult Notes        Complete if patient is withholding blood thinner for procedure     Blood Thinner Patient is currently taking:      [] Plavix (Hold for 7 days)  [] Aspirin (Hold for 5 days)     [] Pletal (Hold for 2 days)  [] Pradaxa (Hold for 3 days)    [] Effient (Hold for 7 days)  [] Xarelto (Hold for 2 days)    [] Eliquis (Hold for 2 days)  [] Brilinta (Hold for 7 days)    [] Coumadin (Hold for 5 days) - (INR needs to be drawn the day prior to procedure- INR < 2.0)    [] Aggrenox (Hold for 7 days)        [] Patient will stop medication on their own.    [] Blood Thinner Form Faxed for approval to hold.   Provider form faxed to:     Assessment Completed by:  Electronically signed by Tara Crump RN on 11/19/2024 at 3:08 PM
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
## 2024-11-19 NOTE — PROGRESS NOTES
Clinic Documentation      Education Provided:  [x] Review of Bony  [x] Agreement Review  [x] PEG Score Calculated [x] PHQ Score Calculated [x] ORT Score Calculated    [] Compliance Issues Discussed [] Cognitive Behavior Needs [x] Exercise [] Review of Test [] Financial Issues  [x] Tobacco/Alcohol Use Reviewed [x] Teaching [x] New Patient [x] Picture Obtained    Physician Plan:  [] Outgoing Referral  [] Pharmacy Consult  [] Test Ordered [x] Prescription Ordered/Changed   [] Obtained Test Results / Consult Notes        Complete if patient is withholding blood thinner for procedure     Blood Thinner Patient is currently taking:      [] Plavix (Hold for 7 days)  [] Aspirin (Hold for 5 days)     [] Pletal (Hold for 2 days)  [] Pradaxa (Hold for 3 days)    [] Effient (Hold for 7 days)  [] Xarelto (Hold for 2 days)    [] Eliquis (Hold for 2 days)  [] Brilinta (Hold for 7 days)    [] Coumadin (Hold for 5 days) - (INR needs to be drawn the day prior to procedure- INR < 2.0)    [] Aggrenox (Hold for 7 days)        [] Patient will stop medication on their own.    [] Blood Thinner Form Faxed for approval to hold.   Provider form faxed to:     Assessment Completed by:  Electronically signed by Sharon Bose MA on 11/19/2024 at 3:10 PM

## 2024-11-21 DIAGNOSIS — G47.01 INSOMNIA DUE TO MEDICAL CONDITION: ICD-10-CM

## 2024-11-21 DIAGNOSIS — F43.12 CHRONIC POST-TRAUMATIC STRESS DISORDER: ICD-10-CM

## 2024-11-21 DIAGNOSIS — F34.9 PERSISTENT MOOD (AFFECTIVE) DISORDER, UNSPECIFIED (HCC): Primary | ICD-10-CM

## 2024-11-21 DIAGNOSIS — F41.0 GENERALIZED ANXIETY DISORDER WITH PANIC ATTACKS: ICD-10-CM

## 2024-11-21 DIAGNOSIS — F41.1 GENERALIZED ANXIETY DISORDER WITH PANIC ATTACKS: ICD-10-CM

## 2024-11-21 DIAGNOSIS — F60.9 PERSONALITY DISORDER, UNSPECIFIED (HCC): ICD-10-CM

## 2024-11-24 DIAGNOSIS — G43.109 MIGRAINE WITH VISUAL AURA: ICD-10-CM

## 2024-11-25 RX ORDER — UBROGEPANT 100 MG/1
TABLET ORAL
Qty: 10 TABLET | Refills: 3 | Status: SHIPPED | OUTPATIENT
Start: 2024-11-25

## 2024-11-25 NOTE — TELEPHONE ENCOUNTER
Gennaro Hatfield has requested a refill on his medication.      Last office visit : 10/17/2024   Next office visit : 2/17/2025       Requested Prescriptions     Pending Prescriptions Disp Refills    UBRELVY 100 MG TABS [Pharmacy Med Name: Ubrelvy 100 mg tablet] 10 tablet 3     Sig: TAKE 1 TABLET BY MOUTH AT ONSET OF MIGRAINE. MAY REPEAT 1 TIME IN 2 HOURS. IF NO IMPROVEMENT. DO NOT EXCEED 2 TABLETS IN 24 HOURS

## 2024-12-09 DIAGNOSIS — R40.4 TRANSIENT ALTERATION OF AWARENESS: ICD-10-CM

## 2024-12-09 DIAGNOSIS — G25.81 RESTLESS LEGS: ICD-10-CM

## 2024-12-09 RX ORDER — ROPINIROLE 1 MG/1
1 TABLET, FILM COATED ORAL 3 TIMES DAILY
Qty: 90 TABLET | Refills: 3 | Status: SHIPPED | OUTPATIENT
Start: 2024-12-09

## 2024-12-09 RX ORDER — LAMOTRIGINE 100 MG/1
100 TABLET ORAL 2 TIMES DAILY
Qty: 90 TABLET | Refills: 3 | Status: SHIPPED | OUTPATIENT
Start: 2024-12-09 | End: 2025-01-08

## 2024-12-09 NOTE — TELEPHONE ENCOUNTER
Requested Prescriptions     Pending Prescriptions Disp Refills    lamoTRIgine (LAMICTAL) 100 MG tablet 90 tablet 3     Sig: Take 1 tablet by mouth 2 times daily    rOPINIRole (REQUIP) 1 MG tablet 90 tablet 3     Sig: Take 1 tablet by mouth 3 times daily       Last Office Visit: 10/17/2024  Next Office Visit: 2/17/2025  Last Medication Refill: 10/17/2024 with 3 RF & 10/17/2024 with 3 RF

## 2024-12-17 ENCOUNTER — TELEPHONE (OUTPATIENT)
Dept: PAIN MANAGEMENT | Age: 59
End: 2024-12-17

## 2024-12-17 NOTE — TELEPHONE ENCOUNTER
Patient saw Nereida Preston, APRN 11/19/24 & she ordered a [facility] Tens Unit. Patient had not heard from [facility] so she called them & they couldn't find referral. I have resent [facility] referral 12/16/24. Called patient to let them know it had been resent.

## 2024-12-17 NOTE — PATIENT INSTRUCTIONS
Patient education: Sleep apnea in adults       INTRODUCTION -- Normally during sleep, air moves through the throat and in and out of the lungs at a regular rhythm. In a person with sleep apnea, air movement is periodically diminished or stopped. There are two types of sleep apnea: obstructive sleep apnea and central sleep apnea. In obstructive sleep apnea, breathing is abnormal because of narrowing or closure of the throat. In central sleep apnea, breathing is abnormal because of a change in the breathing control and rhythm.  Sleep apnea is a serious condition that can affect a person's ability to safely perform normal daily activities and can affect long term health. Approximately 25 percent of adults are at risk for sleep apnea of some degree.  Men are more commonly affected than women. Other risk factors include middle and older age, being overweight or obese, and having a small mouth and throat.  This topic review focuses on the most common type of sleep apnea in adults, obstructive sleep apnea (FESTUS).    HOW SLEEP APNEA OCCURS -- The throat is surrounded by muscles that control the airway for speaking, swallowing, and breathing. During sleep, these muscles are less active, and this causes the throat to narrow.  In most people, this narrowing does not affect breathing. In others, it can cause snoring, sometimes with reduced or completely blocked airflow.  A completely blocked airway without airflow is called an obstructive apnea. Partial obstruction with diminished airflow is called a hypopnea. A person may have apnea and hypopnea during sleep.  Insufficient breathing due to apnea or hypopnea causes oxygen levels to fall and carbon dioxide to rise. Because the airway is blocked, breathing faster or harder does not help to improve oxygen levels until the airway is reopened. Typically, the obstruction requires the person to awaken to activate the upper airway muscles. Once the airway is opened, the person then  There are no Wet Read(s) to document.

## 2025-01-12 DIAGNOSIS — G25.81 RESTLESS LEGS: ICD-10-CM

## 2025-01-13 RX ORDER — ROPINIROLE 0.5 MG/1
0.5 TABLET, FILM COATED ORAL 3 TIMES DAILY
Qty: 90 TABLET | Refills: 3 | OUTPATIENT
Start: 2025-01-13

## 2025-01-22 ENCOUNTER — TELEPHONE (OUTPATIENT)
Dept: PAIN MANAGEMENT | Age: 60
End: 2025-01-22

## 2025-01-23 ENCOUNTER — TELEPHONE (OUTPATIENT)
Dept: PSYCHIATRY | Age: 60
End: 2025-01-23

## 2025-01-23 NOTE — TELEPHONE ENCOUNTER
Called and confirmed appt with pt for 1/24 @ 11 am     Electronically signed by Jose R Rondon on 1/23/2025 at 11:25 AM

## 2025-01-24 ENCOUNTER — SCHEDULED TELEPHONE ENCOUNTER (OUTPATIENT)
Dept: PSYCHIATRY | Age: 60
End: 2025-01-24

## 2025-01-24 ENCOUNTER — TELEPHONE (OUTPATIENT)
Dept: PSYCHIATRY | Age: 60
End: 2025-01-24

## 2025-01-24 DIAGNOSIS — F39 UNSPECIFIED MOOD (AFFECTIVE) DISORDER (HCC): Primary | ICD-10-CM

## 2025-01-24 DIAGNOSIS — F40.00 AGORAPHOBIA: ICD-10-CM

## 2025-01-24 DIAGNOSIS — F41.1 GENERALIZED ANXIETY DISORDER WITH PANIC ATTACKS: ICD-10-CM

## 2025-01-24 DIAGNOSIS — F41.0 GENERALIZED ANXIETY DISORDER WITH PANIC ATTACKS: ICD-10-CM

## 2025-01-24 ASSESSMENT — PATIENT HEALTH QUESTIONNAIRE - PHQ9
6. FEELING BAD ABOUT YOURSELF - OR THAT YOU ARE A FAILURE OR HAVE LET YOURSELF OR YOUR FAMILY DOWN: NEARLY EVERY DAY
SUM OF ALL RESPONSES TO PHQ QUESTIONS 1-9: 20
SUM OF ALL RESPONSES TO PHQ QUESTIONS 1-9: 20
8. MOVING OR SPEAKING SO SLOWLY THAT OTHER PEOPLE COULD HAVE NOTICED. OR THE OPPOSITE, BEING SO FIGETY OR RESTLESS THAT YOU HAVE BEEN MOVING AROUND A LOT MORE THAN USUAL: NOT AT ALL
1. LITTLE INTEREST OR PLEASURE IN DOING THINGS: NEARLY EVERY DAY
4. FEELING TIRED OR HAVING LITTLE ENERGY: NEARLY EVERY DAY
SUM OF ALL RESPONSES TO PHQ QUESTIONS 1-9: 20
SUM OF ALL RESPONSES TO PHQ9 QUESTIONS 1 & 2: 6
7. TROUBLE CONCENTRATING ON THINGS, SUCH AS READING THE NEWSPAPER OR WATCHING TELEVISION: NEARLY EVERY DAY
SUM OF ALL RESPONSES TO PHQ QUESTIONS 1-9: 20
2. FEELING DOWN, DEPRESSED OR HOPELESS: NEARLY EVERY DAY
9. THOUGHTS THAT YOU WOULD BE BETTER OFF DEAD, OR OF HURTING YOURSELF: NOT AT ALL
5. POOR APPETITE OR OVEREATING: MORE THAN HALF THE DAYS
3. TROUBLE FALLING OR STAYING ASLEEP: NEARLY EVERY DAY

## 2025-01-24 ASSESSMENT — ANXIETY QUESTIONNAIRES
2. NOT BEING ABLE TO STOP OR CONTROL WORRYING: MORE THAN HALF THE DAYS
4. TROUBLE RELAXING: NEARLY EVERY DAY
IF YOU CHECKED OFF ANY PROBLEMS ON THIS QUESTIONNAIRE, HOW DIFFICULT HAVE THESE PROBLEMS MADE IT FOR YOU TO DO YOUR WORK, TAKE CARE OF THINGS AT HOME, OR GET ALONG WITH OTHER PEOPLE: VERY DIFFICULT
6. BECOMING EASILY ANNOYED OR IRRITABLE: NEARLY EVERY DAY
GAD7 TOTAL SCORE: 16
1. FEELING NERVOUS, ANXIOUS, OR ON EDGE: MORE THAN HALF THE DAYS
5. BEING SO RESTLESS THAT IT IS HARD TO SIT STILL: NOT AT ALL
7. FEELING AFRAID AS IF SOMETHING AWFUL MIGHT HAPPEN: NEARLY EVERY DAY
3. WORRYING TOO MUCH ABOUT DIFFERENT THINGS: NEARLY EVERY DAY

## 2025-01-24 ASSESSMENT — COLUMBIA-SUICIDE SEVERITY RATING SCALE - C-SSRS
1. WITHIN THE PAST MONTH, HAVE YOU WISHED YOU WERE DEAD OR WISHED YOU COULD GO TO SLEEP AND NOT WAKE UP?: NO
6. HAVE YOU EVER DONE ANYTHING, STARTED TO DO ANYTHING, OR PREPARED TO DO ANYTHING TO END YOUR LIFE?: NO
2. HAVE YOU ACTUALLY HAD ANY THOUGHTS OF KILLING YOURSELF?: NO

## 2025-01-24 NOTE — TELEPHONE ENCOUNTER
Pt was called for virtual appointment check in.    Electronically signed by Juliet Boyce MA on 1/24/2025 at 10:57 AM

## 2025-01-24 NOTE — PROGRESS NOTES
present   Sensorium:  No orientation issues present   Cognition:  grossly intact   Insight:  fair   Judgment:  age appropriate     Suicidal Intentions: No  Suicidal Plan:  No    Assessment - Diagnosis - Goals:     Axis I: Unspecified Mood Affective Disorder              Generalized Anxiety Disorder with panic attacks               Agoraphobia     Axis III: See above    Plan:  1. Continue medication management  2. CBT to target cognitive distortions  3. Discuss therapeutic goals  Able to show and/or verbalize a decrease in sigh/symptoms of anxiety.      Pt interventions:  Therapist posed open-ended questions to facilitate reciprocal communication. Therapist also provided reflective listening/validation, support and encouragement.  Rapport Building and Clinical Information Gathering    Over 50% of the total visit time was spent on counseling and/or coordination of care.         Patient rescheduled on 03/12/25 10 am and 03/31/25 11 am.    DOTTIE Manjarrez

## 2025-02-02 DIAGNOSIS — F41.0 GENERALIZED ANXIETY DISORDER WITH PANIC ATTACKS: ICD-10-CM

## 2025-02-02 DIAGNOSIS — F41.1 GENERALIZED ANXIETY DISORDER WITH PANIC ATTACKS: ICD-10-CM

## 2025-02-03 ENCOUNTER — TELEPHONE (OUTPATIENT)
Dept: PSYCHIATRY | Age: 60
End: 2025-02-03

## 2025-02-03 RX ORDER — BUSPIRONE HYDROCHLORIDE 10 MG/1
10 TABLET ORAL 3 TIMES DAILY
Qty: 90 TABLET | Refills: 5 | Status: SHIPPED | OUTPATIENT
Start: 2025-02-03 | End: 2025-06-03

## 2025-02-03 NOTE — TELEPHONE ENCOUNTER
Pharmacy sent a request to refill pt's medication       Last office visit : 1/24/2025 SIN RODRIGEZ  Next office visit : 2/10/2025 SIN Carbajal MD    Requested Prescriptions     Pending Prescriptions Disp Refills    busPIRone (BUSPAR) 10 MG tablet 90 tablet 3     Sig: Take 1 tablet by mouth 3 times daily            Jose R Rondon        10/31/2024  [facility]       Ana Garcia APRN - CNP  Nurse Practitioner, Psychiatric/Mental Health Persistent mood (affective) disorder, unspecified (HCC) +3 more  Dx Follow-up  Reason for Visit     Progress Notes  Ana Garcia APRN - CNP (Nurse Practitioner)  Nurse Practitioner, Psychiatric/Mental Health  The note has been blocked from the patient portal for the following reason: Likely risk of substantial harm  Expand All Collapse All  Gennaro Hatfield is a 59 y.o. male evaluated via telephone on 10/31/2024 for Follow-up  .          Documentation:  I communicated with the patient and/or health care decision maker about see below.   Details of this discussion including any medical advice provided: see below     Total Time: minutes: 21-30 minutes     Gennaro Hatfield was evaluated through a synchronous (real-time) audio encounter. Patient identification was verified at the start of the visit. He (or guardian if applicable) is aware that this is a billable service, which includes applicable co-pays. This visit was conducted with the patient's (and/or legal guardian's) verbal consent. He has not had a related appointment within my department in the past 7 days or scheduled within the next 24 hours.   The patient was located at Home: [address] [place] [address] [address]  [facility] [address].  The provider was located at Facility (Appt Dept): [address] [facility].  [address]  [place] [id number].     Note: not billable if this call serves to triage the patient into an appointment for the relevant concern     10/31/2024                                         Progress

## 2025-02-03 NOTE — TELEPHONE ENCOUNTER
Pt called stating that his fibromyalgia is acting up and that his PCP said the medication that the PCP would put him on is almost the same as pristiq. Pt wanted to know if Dr. Carbajal would increase the dose for 50 mg to 75mg or 100 mg .      Above info was giving to Dr. Carbajal     Per provider called and let pt know that he could increase the pristiq to 75 mg     Electronically signed by Jose R Rondon on 2/3/2025 at 2:39 PM

## 2025-02-07 ENCOUNTER — TELEPHONE (OUTPATIENT)
Dept: PSYCHIATRY | Age: 60
End: 2025-02-07

## 2025-02-07 NOTE — TELEPHONE ENCOUNTER
Called and confirmed appt with pt for 2/10 @ 2 pm     Electronically signed by Jose R Rondon on 2/7/2025 at 11:30 AM

## 2025-02-07 NOTE — TELEPHONE ENCOUNTER
Called pt to cancel/reschedule his appt for 02/10/25.    Rescheduled for a phone visit for 02/27/25 @ 10:30.    Electronically signed by Juliet Boyce MA on 2/7/2025 at 3:00 PM

## 2025-02-10 ENCOUNTER — TELEPHONE (OUTPATIENT)
Dept: PSYCHIATRY | Age: 60
End: 2025-02-10

## 2025-02-10 DIAGNOSIS — F41.1 GENERALIZED ANXIETY DISORDER WITH PANIC ATTACKS: ICD-10-CM

## 2025-02-10 DIAGNOSIS — F41.0 GENERALIZED ANXIETY DISORDER WITH PANIC ATTACKS: ICD-10-CM

## 2025-02-10 RX ORDER — DESVENLAFAXINE 25 MG/1
75 TABLET, EXTENDED RELEASE ORAL DAILY
Qty: 90 TABLET | Refills: 3 | Status: SHIPPED | OUTPATIENT
Start: 2025-02-10 | End: 2025-03-12

## 2025-02-10 NOTE — TELEPHONE ENCOUNTER
Called and let pt know that his script for pristiq was sent to his pharmacy     Electronically signed by Jose R Rondon on 2/10/2025 at 11:58 AM

## 2025-02-10 NOTE — TELEPHONE ENCOUNTER
Gennaro Hatfield called to request a refill on his medication.     NEEDS RX DUE TO INCREASE DOSE.  PT STATED THAT THE MED HAD BEEN INCREASED TO 75 MG DAILY AND IS NOTED IN PHONE CALL BELOW SO RX EDITED FOR THAT AMOUNT.    Last office visit : 1031/24 with SIN RODRIGEZ  Next office visit : 2/27/2025 with Dr Carbajal     Requested Prescriptions     Pending Prescriptions Disp Refills    desvenlafaxine succinate (PRISTIQ) 25 MG TB24 extended release tablet 90 tablet 3     Sig: Take 3 tablets by mouth daily            Lexie [place], [place] Rondon, Jose R   Technician     Telephone Encounter     Signed     Encounter Date: 2/3/2025     Signed         Pt called stating that his fibromyalgia is acting up and that his PCP said the medication that the PCP would put him on is almost the same as pristiq. Pt wanted to know if Dr. Carbajal would increase the dose for 50 mg to 75mg or 100 mg .        Above info was giving to Dr. Carbajal      Per provider called and let pt know that he could increase the pristiq to 75 mg      Electronically signed by Jose R Rondon on 2/3/2025 at 2:39 PM                10/31/2024                                         Progress Note     Gennaro Hatfield 1965                       Chief Complaint   Patient presents with    Follow-up         Goes by [place]     Patient is a 59 y.o. male diagnosed with persistent mood disorder, anxiety, PTSD, tic disorder, insomnia and presents today for follow-up.  Last seen in clinic on 8/28/24 and prior records were reviewed.     Telehealth visit.  Currently taking Pristiq, buspirone, doxepin. On Lamictal from Neurology. Reports compliance with medications, denies any negative unwanted side effects. Reports compliance with medications, denies any negative or unwanted side effects. His older dog passed away. We processed his feelings, supportive psychotherapy and grief counseling provided. Reports sleep schedule has been mixed up, sleeping during day and

## 2025-02-17 ENCOUNTER — OFFICE VISIT (OUTPATIENT)
Dept: NEUROLOGY | Age: 60
End: 2025-02-17
Payer: MEDICARE

## 2025-02-17 VITALS
WEIGHT: 180 LBS | DIASTOLIC BLOOD PRESSURE: 64 MMHG | HEIGHT: 67 IN | HEART RATE: 74 BPM | BODY MASS INDEX: 28.25 KG/M2 | OXYGEN SATURATION: 97 % | SYSTOLIC BLOOD PRESSURE: 104 MMHG

## 2025-02-17 DIAGNOSIS — M54.50 LUMBAR PAIN: ICD-10-CM

## 2025-02-17 DIAGNOSIS — R40.4 TRANSIENT ALTERATION OF AWARENESS: ICD-10-CM

## 2025-02-17 DIAGNOSIS — G25.81 RESTLESS LEGS: ICD-10-CM

## 2025-02-17 DIAGNOSIS — M54.2 CERVICAL PAIN: ICD-10-CM

## 2025-02-17 DIAGNOSIS — R55 SYNCOPE AND COLLAPSE: Primary | ICD-10-CM

## 2025-02-17 DIAGNOSIS — G43.109 MIGRAINE WITH VISUAL AURA: ICD-10-CM

## 2025-02-17 DIAGNOSIS — G47.33 OBSTRUCTIVE SLEEP APNEA: ICD-10-CM

## 2025-02-17 PROCEDURE — 99214 OFFICE O/P EST MOD 30 MIN: CPT | Performed by: PSYCHIATRY & NEUROLOGY

## 2025-02-17 RX ORDER — TIZANIDINE 2 MG/1
4 TABLET ORAL 4 TIMES DAILY
COMMUNITY
Start: 2024-12-17 | End: 2025-02-17 | Stop reason: ALTCHOICE

## 2025-02-17 RX ORDER — FOLIC ACID 1 MG/1
1 TABLET ORAL DAILY
COMMUNITY

## 2025-02-17 NOTE — PROGRESS NOTES
[facility] Office Note      Patient:   Gennaro Hatfield  MR#:    [id number]  Account Number:                         YOB: 1965  Date of Evaluation:  2/17/2025  Time of Note:                          1:14 PM  Primary/Referring Physician:  Yohan Aguirre DO   Consulting Physician:  Khoi Barboza DO    FOLLOW UP    Chief Complaint   Patient presents with    Follow-up     New to Provider     Restless Leg(s)    Seizures     Last episode was this morning having drop attacks started happening 2004       HISTORY OF PRESENT ILLNESS    Gennaro Hatfield is a 59 y.o. year old male here for possible seizure vs syncope.  Migraine and RLS history noted. Doing about the same, still noting some intermittent events.  Occurs suddenly, no dizziness, chest pain associated with events.  No overt GTC activity recently.  Prior EEG normal.  MRI brain with non-specific white matter changes.  Chronic neck and back pain noted, MRI C/L spine with ddd, moderate spinal stenosis, nf narrowing. Prior NCS/EMG with mild CTS. Has been by cardiology, normal holter,stress ECHO.   Seen by ENT as well previously. Headaches are waxing and waning, no overt progression. FESTUS noted as well, unable to tolerate CPAP. Prior history of CRAO, ophthalmology following. History of fibromyalgia as well.         Past Medical History:   Diagnosis Date    Asthma     Atrial fibrillation (HCC)     Bronchitis     Cerebral artery occlusion with cerebral infarction (HCC) 12/19/2022    right eye    Chest pain     COPD (chronic obstructive pulmonary disease) (HCC)     CPAP (continuous positive airway pressure) dependence     Epilepsy (HCC)     Fibromyalgia     GERD (gastroesophageal reflux disease)     Hiatal hernia     Hyperlipidemia     Hypertension     Irregular heartbeat     Kidney failure     Low testosterone     FESTUS (obstructive sleep apnea)     FESTUS (obstructive sleep apnea)     Stomach ulcer     Tourette's        Past Surgical History:   Procedure

## 2025-02-26 ENCOUNTER — TELEPHONE (OUTPATIENT)
Dept: PSYCHIATRY | Age: 60
End: 2025-02-26

## 2025-02-26 NOTE — TELEPHONE ENCOUNTER
Called patient to remind them of their appointment   -Pt confirmed      Reminded patient of their     copay for their appointment. Reminded patient to complete their visit pre-check/digital registration in [email or web address].    Electronically signed by Alicia D Giraldo-Reyes on 2/26/2025 at 1:05 PM

## 2025-02-27 ENCOUNTER — SCHEDULED TELEPHONE ENCOUNTER (OUTPATIENT)
Dept: PSYCHIATRY | Age: 60
End: 2025-02-27

## 2025-02-27 DIAGNOSIS — F41.0 GENERALIZED ANXIETY DISORDER WITH PANIC ATTACKS: ICD-10-CM

## 2025-02-27 DIAGNOSIS — F60.9 PERSONALITY DISORDER, UNSPECIFIED (HCC): ICD-10-CM

## 2025-02-27 DIAGNOSIS — F41.1 GENERALIZED ANXIETY DISORDER WITH PANIC ATTACKS: ICD-10-CM

## 2025-02-27 DIAGNOSIS — G47.00 INSOMNIA, UNSPECIFIED TYPE: ICD-10-CM

## 2025-02-27 DIAGNOSIS — F39 UNSPECIFIED MOOD (AFFECTIVE) DISORDER: Primary | ICD-10-CM

## 2025-02-27 ASSESSMENT — PATIENT HEALTH QUESTIONNAIRE - PHQ9
SUM OF ALL RESPONSES TO PHQ QUESTIONS 1-9: 6
SUM OF ALL RESPONSES TO PHQ QUESTIONS 1-9: 6
7. TROUBLE CONCENTRATING ON THINGS, SUCH AS READING THE NEWSPAPER OR WATCHING TELEVISION: SEVERAL DAYS
SUM OF ALL RESPONSES TO PHQ QUESTIONS 1-9: 6
1. LITTLE INTEREST OR PLEASURE IN DOING THINGS: SEVERAL DAYS
9. THOUGHTS THAT YOU WOULD BE BETTER OFF DEAD, OR OF HURTING YOURSELF: NOT AT ALL
3. TROUBLE FALLING OR STAYING ASLEEP: MORE THAN HALF THE DAYS
6. FEELING BAD ABOUT YOURSELF - OR THAT YOU ARE A FAILURE OR HAVE LET YOURSELF OR YOUR FAMILY DOWN: NOT AT ALL
5. POOR APPETITE OR OVEREATING: NOT AT ALL
SUM OF ALL RESPONSES TO PHQ QUESTIONS 1-9: 6
4. FEELING TIRED OR HAVING LITTLE ENERGY: SEVERAL DAYS
2. FEELING DOWN, DEPRESSED OR HOPELESS: SEVERAL DAYS
10. IF YOU CHECKED OFF ANY PROBLEMS, HOW DIFFICULT HAVE THESE PROBLEMS MADE IT FOR YOU TO DO YOUR WORK, TAKE CARE OF THINGS AT HOME, OR GET ALONG WITH OTHER PEOPLE: SOMEWHAT DIFFICULT
SUM OF ALL RESPONSES TO PHQ9 QUESTIONS 1 & 2: 2
8. MOVING OR SPEAKING SO SLOWLY THAT OTHER PEOPLE COULD HAVE NOTICED. OR THE OPPOSITE, BEING SO FIGETY OR RESTLESS THAT YOU HAVE BEEN MOVING AROUND A LOT MORE THAN USUAL: NOT AT ALL

## 2025-02-27 NOTE — PROGRESS NOTES
2/27/2025 3:11 PM   Progress Note      Documentation:  I communicated with the patient and/or health care decision maker about - see below  Details of this discussion including any medical advice provided: see below    Total Time: minutes: 21-30 minutes    Gennaro Hatfield was evaluated through a synchronous (real-time) audio encounter. Patient identification was verified at the start of the visit. He (or guardian if applicable) is aware that this is a billable service, which includes applicable co-pays. This visit was conducted with the patient's (and/or legal guardian's) verbal consent. He has not had a related appointment within my department in the past 7 days or scheduled within the next 24 hours.   The patient was located at Home: [address]  [address]  [facility] [address].  The provider was located at Facility (Appt Dept): [address]  [address]  [place] [address].  Confirm you are appropriately licensed, registered, or certified to deliver care in the state where the patient is located as indicated above. If you are not or unsure, please re-schedule the visit: Yes, I confirm.     Note: not billable if this call serves to triage the patient into an appointment for the relevant concern    Gennaro Hatfield is a 59 y.o. male evaluated via telephone on 2/27/2025 for Depression, Anxiety, and Insomnia  .        Montserrat Carbajal MD       Gennaro Lemuslorenza 1965      Chief Complaint   Patient presents with    Depression    Anxiety    Insomnia         Subjective:    59 y.o. white male with mood disorder, anxiety, PTSD, Tourette syndrome, insomnia, RLS, chronic pain, seizures, and presents today for follow-up.  Currently taking Pristiq, buspirone, doxepin, Saphris as needed, Requip. On Lamictal and Requip from Neurology.      Pt states he is \"not doing too well\". He is lonely. No family or friends.  Life insurance reps have been cold calling him a lot - pushed him over the edge. He has been overspending

## 2025-03-11 ENCOUNTER — TELEPHONE (OUTPATIENT)
Dept: PSYCHIATRY | Age: 60
End: 2025-03-11

## 2025-03-11 NOTE — TELEPHONE ENCOUNTER
Called patient to remind them of their appointment     -Pt confirmed  Reminded patient to complete their visit pre-check/digital registration in [employer].    Changed to a [employer] virtual visit.    Electronically signed by Juliet Boyce MA on 3/11/2025 at 11:17 AM

## 2025-03-12 ENCOUNTER — TELEMEDICINE (OUTPATIENT)
Dept: PSYCHIATRY | Age: 60
End: 2025-03-12

## 2025-03-12 ENCOUNTER — TELEPHONE (OUTPATIENT)
Dept: PSYCHIATRY | Age: 60
End: 2025-03-12

## 2025-03-12 DIAGNOSIS — F39 UNSPECIFIED MOOD (AFFECTIVE) DISORDER: Primary | ICD-10-CM

## 2025-03-12 DIAGNOSIS — F41.1 GENERALIZED ANXIETY DISORDER WITH PANIC ATTACKS: ICD-10-CM

## 2025-03-12 DIAGNOSIS — F41.0 GENERALIZED ANXIETY DISORDER WITH PANIC ATTACKS: ICD-10-CM

## 2025-03-12 ASSESSMENT — ANXIETY QUESTIONNAIRES
3. WORRYING TOO MUCH ABOUT DIFFERENT THINGS: MORE THAN HALF THE DAYS
4. TROUBLE RELAXING: NEARLY EVERY DAY
1. FEELING NERVOUS, ANXIOUS, OR ON EDGE: SEVERAL DAYS
6. BECOMING EASILY ANNOYED OR IRRITABLE: NEARLY EVERY DAY
5. BEING SO RESTLESS THAT IT IS HARD TO SIT STILL: SEVERAL DAYS
7. FEELING AFRAID AS IF SOMETHING AWFUL MIGHT HAPPEN: NOT AT ALL
2. NOT BEING ABLE TO STOP OR CONTROL WORRYING: MORE THAN HALF THE DAYS
GAD7 TOTAL SCORE: 12
IF YOU CHECKED OFF ANY PROBLEMS ON THIS QUESTIONNAIRE, HOW DIFFICULT HAVE THESE PROBLEMS MADE IT FOR YOU TO DO YOUR WORK, TAKE CARE OF THINGS AT HOME, OR GET ALONG WITH OTHER PEOPLE: VERY DIFFICULT

## 2025-03-12 ASSESSMENT — PATIENT HEALTH QUESTIONNAIRE - PHQ9
10. IF YOU CHECKED OFF ANY PROBLEMS, HOW DIFFICULT HAVE THESE PROBLEMS MADE IT FOR YOU TO DO YOUR WORK, TAKE CARE OF THINGS AT HOME, OR GET ALONG WITH OTHER PEOPLE: VERY DIFFICULT
9. THOUGHTS THAT YOU WOULD BE BETTER OFF DEAD, OR OF HURTING YOURSELF: SEVERAL DAYS
1. LITTLE INTEREST OR PLEASURE IN DOING THINGS: NEARLY EVERY DAY
SUM OF ALL RESPONSES TO PHQ QUESTIONS 1-9: 17
4. FEELING TIRED OR HAVING LITTLE ENERGY: NEARLY EVERY DAY
8. MOVING OR SPEAKING SO SLOWLY THAT OTHER PEOPLE COULD HAVE NOTICED. OR THE OPPOSITE, BEING SO FIGETY OR RESTLESS THAT YOU HAVE BEEN MOVING AROUND A LOT MORE THAN USUAL: NOT AT ALL
5. POOR APPETITE OR OVEREATING: NOT AT ALL
SUM OF ALL RESPONSES TO PHQ QUESTIONS 1-9: 17
6. FEELING BAD ABOUT YOURSELF - OR THAT YOU ARE A FAILURE OR HAVE LET YOURSELF OR YOUR FAMILY DOWN: MORE THAN HALF THE DAYS
2. FEELING DOWN, DEPRESSED OR HOPELESS: NEARLY EVERY DAY
3. TROUBLE FALLING OR STAYING ASLEEP: NEARLY EVERY DAY
SUM OF ALL RESPONSES TO PHQ QUESTIONS 1-9: 16
7. TROUBLE CONCENTRATING ON THINGS, SUCH AS READING THE NEWSPAPER OR WATCHING TELEVISION: MORE THAN HALF THE DAYS
SUM OF ALL RESPONSES TO PHQ QUESTIONS 1-9: 17

## 2025-03-12 ASSESSMENT — COLUMBIA-SUICIDE SEVERITY RATING SCALE - C-SSRS
6. HAVE YOU EVER DONE ANYTHING, STARTED TO DO ANYTHING, OR PREPARED TO DO ANYTHING TO END YOUR LIFE?: NO
1. WITHIN THE PAST MONTH, HAVE YOU WISHED YOU WERE DEAD OR WISHED YOU COULD GO TO SLEEP AND NOT WAKE UP?: YES
2. HAVE YOU ACTUALLY HAD ANY THOUGHTS OF KILLING YOURSELF?: NO

## 2025-03-12 NOTE — PROGRESS NOTES
Gennaro NICOLE Alysia, was evaluated through a synchronous (real-time) audio-video encounter. The patient (or guardian if applicable) is aware that this is a billable service, which includes applicable co-pays. This Virtual Visit was conducted with patient's (and/or legal guardian's) consent. Patient identification was verified, and a caregiver was present when appropriate.   The patient was located at Home: [address]  [address]  [place] [address]  Provider was located at Facility (Appt Dept): [address]  [address]  [place] [address]  Confirm you are appropriately licensed, registered, or certified to deliver care in the state where the patient is located as indicated above. If you are not or unsure, please re-schedule the visit: Yes, I confirm.      Total time spent for this encounter:  55    --DOTTIE Manjarrez on 3/12/2025 at 10:09 AM    An electronic signature was used to authenticate this note.    Therapy Progress Note  DOTTIE Manjarrez   3/12/2025    Patient location  :Home  [facility] location : [facility]      Total time spent: 55 minutes  This is patient's second  Therapy appointment.  Chief Complaint:  depression, anxiety, and stress  Referring Provider: No referring provider defined for this encounter.      Gennaro COLEYMonicaElsie ,a 59 y.o. adult, for follow up visit.     Pt provided informed consent for the behavioral health program. Discussed with patient model of service to include the limits of confidentiality (i.e. abuse reporting, suicide intervention, etc.) and short-term intervention focused approach.  Discussed no show and late cancellation policy.  Pt indicated understanding.    S:  Provider seeing patient in follow-up for depression/anxiety/stress.  Patient discussed having significant housing problems in his trailer, has no running water in kitchen, has no oven, toilet won't flush and waste comes up pipes in bathtub, unable to shower, and can't afford to move. Patient

## 2025-03-12 NOTE — TELEPHONE ENCOUNTER
Pt was called for virtual appointment check in.    Electronically signed by Juliet Boyce MA on 3/12/2025 at 9:42 AM

## 2025-03-13 ENCOUNTER — TELEPHONE (OUTPATIENT)
Dept: PSYCHIATRY | Age: 60
End: 2025-03-13

## 2025-03-13 DIAGNOSIS — R40.4 TRANSIENT ALTERATION OF AWARENESS: ICD-10-CM

## 2025-03-13 RX ORDER — LAMOTRIGINE 25 MG/1
TABLET ORAL
Qty: 180 TABLET | Refills: 5 | OUTPATIENT
Start: 2025-03-13

## 2025-03-13 NOTE — TELEPHONE ENCOUNTER
Requested Prescriptions     Pending Prescriptions Disp Refills    lamoTRIgine (LAMICTAL) 25 MG tablet [Pharmacy Med Name: lamotrigine 25 mg tablet] 180 tablet 2     Sig: USE 100mg titration PER sheet provide in office       Last Office Visit: 2/17/2025  Next Office Visit: 8/18/2025  Last Medication Refill:12/23/2024

## 2025-03-13 NOTE — TELEPHONE ENCOUNTER
PA was approved for Pristiq ER 25 mg    Called pharmacy and pt to let them know that the PA was approved     Electronically signed by Jose R Rondon on 3/13/2025 at 10:08 AM

## 2025-03-28 ENCOUNTER — TELEPHONE (OUTPATIENT)
Dept: PSYCHIATRY | Age: 60
End: 2025-03-28

## 2025-03-28 NOTE — TELEPHONE ENCOUNTER
Called and confirmed appt with pt for 3/31 @ 11 am     Electronically signed by Jose R Rondon on 3/28/2025 at 11:27 AM

## 2025-03-31 ENCOUNTER — TELEMEDICINE (OUTPATIENT)
Dept: PSYCHIATRY | Age: 60
End: 2025-03-31
Payer: MEDICARE

## 2025-03-31 DIAGNOSIS — F39 UNSPECIFIED MOOD (AFFECTIVE) DISORDER: Primary | ICD-10-CM

## 2025-03-31 DIAGNOSIS — F41.1 GENERALIZED ANXIETY DISORDER: ICD-10-CM

## 2025-03-31 PROCEDURE — 90837 PSYTX W PT 60 MINUTES: CPT | Performed by: COUNSELOR

## 2025-03-31 ASSESSMENT — ANXIETY QUESTIONNAIRES
2. NOT BEING ABLE TO STOP OR CONTROL WORRYING: MORE THAN HALF THE DAYS
1. FEELING NERVOUS, ANXIOUS, OR ON EDGE: MORE THAN HALF THE DAYS
3. WORRYING TOO MUCH ABOUT DIFFERENT THINGS: MORE THAN HALF THE DAYS
IF YOU CHECKED OFF ANY PROBLEMS ON THIS QUESTIONNAIRE, HOW DIFFICULT HAVE THESE PROBLEMS MADE IT FOR YOU TO DO YOUR WORK, TAKE CARE OF THINGS AT HOME, OR GET ALONG WITH OTHER PEOPLE: EXTREMELY DIFFICULT
6. BECOMING EASILY ANNOYED OR IRRITABLE: NEARLY EVERY DAY
5. BEING SO RESTLESS THAT IT IS HARD TO SIT STILL: SEVERAL DAYS
7. FEELING AFRAID AS IF SOMETHING AWFUL MIGHT HAPPEN: SEVERAL DAYS
GAD7 TOTAL SCORE: 14
4. TROUBLE RELAXING: NEARLY EVERY DAY

## 2025-03-31 ASSESSMENT — COLUMBIA-SUICIDE SEVERITY RATING SCALE - C-SSRS
1. WITHIN THE PAST MONTH, HAVE YOU WISHED YOU WERE DEAD OR WISHED YOU COULD GO TO SLEEP AND NOT WAKE UP?: YES
2. HAVE YOU ACTUALLY HAD ANY THOUGHTS OF KILLING YOURSELF?: NO
6. HAVE YOU EVER DONE ANYTHING, STARTED TO DO ANYTHING, OR PREPARED TO DO ANYTHING TO END YOUR LIFE?: NO

## 2025-03-31 ASSESSMENT — PATIENT HEALTH QUESTIONNAIRE - PHQ9
9. THOUGHTS THAT YOU WOULD BE BETTER OFF DEAD, OR OF HURTING YOURSELF: SEVERAL DAYS
10. IF YOU CHECKED OFF ANY PROBLEMS, HOW DIFFICULT HAVE THESE PROBLEMS MADE IT FOR YOU TO DO YOUR WORK, TAKE CARE OF THINGS AT HOME, OR GET ALONG WITH OTHER PEOPLE: EXTREMELY DIFFICULT
SUM OF ALL RESPONSES TO PHQ QUESTIONS 1-9: 18
2. FEELING DOWN, DEPRESSED OR HOPELESS: NEARLY EVERY DAY
4. FEELING TIRED OR HAVING LITTLE ENERGY: NEARLY EVERY DAY
1. LITTLE INTEREST OR PLEASURE IN DOING THINGS: NEARLY EVERY DAY
7. TROUBLE CONCENTRATING ON THINGS, SUCH AS READING THE NEWSPAPER OR WATCHING TELEVISION: MORE THAN HALF THE DAYS
SUM OF ALL RESPONSES TO PHQ QUESTIONS 1-9: 18
6. FEELING BAD ABOUT YOURSELF - OR THAT YOU ARE A FAILURE OR HAVE LET YOURSELF OR YOUR FAMILY DOWN: NEARLY EVERY DAY
3. TROUBLE FALLING OR STAYING ASLEEP: NEARLY EVERY DAY
5. POOR APPETITE OR OVEREATING: NOT AT ALL
8. MOVING OR SPEAKING SO SLOWLY THAT OTHER PEOPLE COULD HAVE NOTICED. OR THE OPPOSITE, BEING SO FIGETY OR RESTLESS THAT YOU HAVE BEEN MOVING AROUND A LOT MORE THAN USUAL: NOT AT ALL
SUM OF ALL RESPONSES TO PHQ QUESTIONS 1-9: 18
SUM OF ALL RESPONSES TO PHQ QUESTIONS 1-9: 17

## 2025-03-31 NOTE — PROGRESS NOTES
Gennaro Hatfield, was evaluated through a synchronous (real-time) audio-video encounter. The patient (or guardian if applicable) is aware that this is a billable service, which includes applicable co-pays. This Virtual Visit was conducted with patient's (and/or legal guardian's) consent. Patient identification was verified, and a caregiver was present when appropriate.   The patient was located at Home: [address]  [address]  [place] [address]  Provider was located at Facility (Appt Dept): [address]  [address]  [place] [address]  Confirm you are appropriately licensed, registered, or certified to deliver care in the state where the patient is located as indicated above. If you are not or unsure, please re-schedule the visit: Yes, I confirm.      Total time spent for this encounter: 55    --DOTTIE Manjarrez on 3/31/2025 at 12:28 PM    An electronic signature was used to authenticate this note.    Therapy Progress Note  DOTTIE Manjarrez   3/31/2025    Patient location  :[facility]  [facility] location : [facility]      Total time spent: 55 minutes  This is patient's third  Therapy appointment.  Chief Complaint:  depression, anxiety, and stress  Referring Provider: No referring provider defined for this encounter.      Gennaro COLEYMonicaElsie ,a 59 y.o. adult, for follow up visit.     Pt provided informed consent for the behavioral health program. Discussed with patient model of service to include the limits of confidentiality (i.e. abuse reporting, suicide intervention, etc.) and short-term intervention focused approach.  Discussed no show and late cancellation policy.  Pt indicated understanding.    S:  Provider seeing patient in follow-up for depression/anxiety/irritability/stressors.  Patient discussed having ongoing problems in his living environment-trailer has no proper running water/no working vehicle/limited food for himself and for pets/cockroach

## 2025-04-04 ENCOUNTER — APPOINTMENT (OUTPATIENT)
Dept: GENERAL RADIOLOGY | Age: 60
End: 2025-04-04
Payer: MEDICARE

## 2025-04-04 ENCOUNTER — HOSPITAL ENCOUNTER (EMERGENCY)
Age: 60
Discharge: HOME | End: 2025-04-04
Attending: EMERGENCY MEDICINE
Payer: MEDICARE

## 2025-04-04 VITALS
DIASTOLIC BLOOD PRESSURE: 51 MMHG | SYSTOLIC BLOOD PRESSURE: 121 MMHG | RESPIRATION RATE: 20 BRPM | TEMPERATURE: 97.4 F | HEIGHT: 67 IN | BODY MASS INDEX: 27.47 KG/M2 | OXYGEN SATURATION: 98 % | HEART RATE: 66 BPM | WEIGHT: 175 LBS
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
## 2025-04-04 DIAGNOSIS — S40.011A CONTUSION OF RIGHT SHOULDER, INITIAL ENCOUNTER: ICD-10-CM

## 2025-04-04 DIAGNOSIS — S50.02XA CONTUSION OF LEFT ELBOW, INITIAL ENCOUNTER: ICD-10-CM

## 2025-04-04 PROCEDURE — 71045 X-RAY EXAM CHEST 1 VIEW: CPT

## 2025-04-04 PROCEDURE — 73080 X-RAY EXAM OF ELBOW: CPT

## 2025-04-04 PROCEDURE — 99283 EMERGENCY DEPT VISIT LOW MDM: CPT

## 2025-04-04 PROCEDURE — 73030 X-RAY EXAM OF SHOULDER: CPT

## 2025-04-04 ASSESSMENT — ENCOUNTER SYMPTOMS
EYES NEGATIVE: 1
GASTROINTESTINAL NEGATIVE: 1
RESPIRATORY NEGATIVE: 1

## 2025-04-04 ASSESSMENT — PAIN - FUNCTIONAL ASSESSMENT: PAIN_FUNCTIONAL_ASSESSMENT: 0-10

## 2025-04-04 ASSESSMENT — PAIN SCALES - GENERAL: PAINLEVEL_OUTOF10: 9

## 2025-04-04 NOTE — ED PROVIDER NOTES
[facility] EMERGENCY DEPARTMENT  EMERGENCY DEPARTMENT ENCOUNTER      Pt Name: Gennaro Hatfield  MRN: [id number]  Birthdate 1965  Date of evaluation: 4/4/2025  Provider: Андрей Hayward Jr, MD    CHIEF COMPLAINT       Chief Complaint   Patient presents with    Fall     Pt complains of pain after falling out of bed. Per EMS he has \"drop seizures\".         HISTORY OF PRESENT ILLNESS   (Location/Symptom, Timing/Onset,Context/Setting, Quality, Duration, Modifying Factors, Severity)  Note limiting factors.   Gennaro Hatfield is a 59 y.o. adult who presents to the emergency department for evaluation after a fall off of his bed.  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  Unsure if he was dreaming or had a drop seizure that caused him to fall.  Says that he woke up just that his feet were touching the ground.  Landed on right shoulder and left elbow.  Says that he felt like there was some crunching in the right shoulder area when he landed.  Has been able to move everything.  No shortness of breath.  No head injury.  No chest or abdominal pain.  Has been ambulatory and able to bear weight on both lower extremities    HPI    NursingNotes were reviewed.    REVIEW OF SYSTEMS    (2-9 systems for level 4, 10 or more for level 5)     Review of Systems   Constitutional: Negative.    HENT: Negative.     Eyes: Negative.    Respiratory: Negative.     Cardiovascular: Negative.    Gastrointestinal: Negative.    Genitourinary: Negative.    Musculoskeletal:  Positive for arthralgias.   Skin: Negative.    Neurological: Negative.    Hematological: Negative.    Psychiatric/Behavioral: Negative.         A complete review of systems was performed and is negative except as noted above in the HPI.       PAST MEDICAL HISTORY     Past Medical History:   Diagnosis Date    Asthma     Atrial fibrillation (HCC)     Bronchitis     Cerebral artery occlusion with cerebral infarction (HCC) 12/19/2022    right eye    Chest pain     COPD     FLUTICASONE-SALMETEROL (ADVAIR) 250-50 MCG/ACT AEPB DISKUS INHALER    Inhale 1 puff into the lungs 2 times daily    FOLIC ACID (FOLVITE) 1 MG TABLET    Take 1 tablet by mouth daily    INDAPAMIDE (LOZOL) 2.5 MG TABLET        LAMOTRIGINE (LAMICTAL) 100 MG TABLET    Take 1 tablet by mouth 2 times daily    MECLIZINE (ANTIVERT) 25 MG TABLET    Take 1 tablet by mouth as needed    METOPROLOL SUCCINATE (TOPROL XL) 50 MG EXTENDED RELEASE TABLET    Take 1.5 tablets by mouth nightly    OMEPRAZOLE (PRILOSEC) 40 MG DELAYED RELEASE CAPSULE    Take 1 capsule by mouth 2 times daily    ROPINIROLE (REQUIP) 1 MG TABLET    Take 1 tablet by mouth 3 times daily    ROSUVASTATIN (CRESTOR) 10 MG TABLET        TESTOSTERONE CYPIONATE (DEPOTESTOTERONE CYPIONATE) 200 MG/ML INJECTION    Inject 1 mL into the muscle once a week.    TESTOSTERONE ENANTHATE (DELATESTRYL) 200 MG/ML INJECTION        TIZANIDINE (ZANAFLEX) 4 MG TABLET    Take 1 tablet by mouth nightly    UBRELVY 100 MG TABS    TAKE 1 TABLET BY MOUTH AT ONSET OF MIGRAINE. MAY REPEAT 1 TIME IN 2 HOURS. IF NO IMPROVEMENT. DO NOT EXCEED 2 TABLETS IN 24 HOURS       ALLERGIES     Carisoprodol, Bacitra-neomycin-polymyxin-hc, Flexeril [cyclobenzaprine], Gabapentin, Tetracyclines & related, Codeine, Lithium, Paroxetine, Pregabalin, and Robaxin [methocarbamol]    FAMILY HISTORY       Family History   Problem Relation Age of Onset    Colon Cancer Neg Hx     Colon Polyps Neg Hx           SOCIAL HISTORY       Social History     Socioeconomic History    Marital status:      Spouse name: None    Number of children: None    Years of education: None    Highest education level: None   Tobacco Use    Smoking status: Former     Current packs/day: 1.00     Types: Cigarettes     Passive exposure: Past    Smokeless tobacco: Never   Vaping Use    Vaping status: Never Used   Substance and Sexual Activity    Alcohol use: No    Drug use: No    Sexual activity: Never     Comment: Pt is a transgender

## 2025-04-08 ENCOUNTER — TELEPHONE (OUTPATIENT)
Dept: PSYCHIATRY | Age: 60
End: 2025-04-08

## 2025-04-08 NOTE — TELEPHONE ENCOUNTER
Pt's is having trouble with his hallucinations.  Throwing things while he is asleep and awake.  It will wake him up.  Standing on the bed and fell off and hurt his shoulder. Don't know why I was standing on his bed.  His scrolling hand will start touching things on the phone that he did not want to touch.  Pick stuff up, look at it, hold it in his hand and then he throws it.  Doesn't happen every time.   a pen while he is trying to write and throws the pen.  Not SI or HI and they are not commanding.  They are hiding, don't want to talk to him they are just hiding.  Stack of clothes and the person just stands there touching his clothes.   his kitchen and they will watch me and one of them will touch him while he is sleeping.  Hears mother talking when he is sleeping.  The ones that are talking to him I don't see them.  The ones that he can see they don't talk.  Pt refused to go to [facility] or [facility] because he does not want to go to the ER and does not want to be put on a 72 hour hold because he has animals to take care of.  He has a service dog that he needs and they will not let him have if he gets admitted.  Pt stated that he can keep himself safe and he knows to go to the ER if it gets worse.  Pt just doesn't understand why he is doing what he is doing and is confused as well.  MA informed pt that our office would recommend him go to the ER to be evaluated and pt declined.  Pt stated that all of this happens at night.  MA informed pt to go to the ER when its happening that way they can see what he is telling them.  Pt was agreeable to go to the ER when he is having hallucinations.  MA informed pt that someone from our unit will come and do the evaluation and talk with him there.  Pt verbally stated his understanding.  MA informed that he would get with Marco A RODRIGEZ and see what he thinks we can do since Dr. Carbajal and Seema Monique are out of the office till 04/14/25.  Pt

## 2025-04-14 DIAGNOSIS — R40.4 TRANSIENT ALTERATION OF AWARENESS: ICD-10-CM

## 2025-04-14 RX ORDER — LAMOTRIGINE 100 MG/1
100 TABLET ORAL 2 TIMES DAILY
Qty: 180 TABLET | Refills: 3 | Status: SHIPPED | OUTPATIENT
Start: 2025-04-14 | End: 2025-07-13

## 2025-04-14 NOTE — TELEPHONE ENCOUNTER
Requested Prescriptions     Pending Prescriptions Disp Refills    lamoTRIgine (LAMICTAL) 100 MG tablet 180 tablet 3     Sig: Take 1 tablet by mouth 2 times daily       Last Office Visit: 2/17/2025  Next Office Visit: 8/18/2025  Last Medication Refill:3/01/2025

## 2025-04-21 RX ORDER — ASENAPINE MALEATE 2.5 MG/1
2.5 TABLET SUBLINGUAL 2 TIMES DAILY PRN
Qty: 60 TABLET | Refills: 3 | Status: SHIPPED | OUTPATIENT
Start: 2025-04-21

## 2025-04-21 NOTE — TELEPHONE ENCOUNTER
Alcohol use: No    Drug use: No    Sexual activity: Never       Comment: Pt is a transgender      Social Determinants of Health        Received from [facility]     Family and Community Support     Received from [facility]     Abuse Screen     Received from [facility]     Housing Stability            MSE:  Appearance: uto  Behavior: Calm, cooperative, somewhat irritable  Speech: Normal in tone, volume, and quality. No slurring, dysarthria or pressured speech noted.   Mood: \"not good\"   Affect: uto  Thought Process: Appears linear, logical and goal oriented. Causality appears intact.   Thought Content: Denies active suicidal and homicidal ideations. No overt delusions or paranoia appreciated.   Perceptions: Denies auditory or visual hallucinations at present time. Not responding to internal stimuli.   Concentration: Intact.   Orientation: to person, place, date, and situation.   Language: Intact.   Fund of information: Intact.   Memory: Recent and remote appear intact.   Impulsivity: Limited.   Neurovegitative: poor appetite and sleep.   Insight: Some.   Judgment: Fair.              Lab Results   Component Value Date      10/26/2018     K 4.2 10/26/2018      10/26/2018     CO2 23 10/26/2018     BUN 15 10/26/2018     CREATININE 0.9 10/26/2018     GLUCOSE 107 10/26/2018     CALCIUM 9.1 10/26/2018     BILITOT <0.2 10/26/2018     ALKPHOS 91 10/26/2018     AST 14 10/26/2018     ALT 15 10/26/2018     LABGLOM >60 10/26/2018            Lab Results   Component Value Date/Time      10/26/2018 12:02 AM     K 4.2 10/26/2018 12:02 AM      10/26/2018 12:02 AM     CO2 23 10/26/2018 12:02 AM     BUN 15 10/26/2018 12:02 AM     CREATININE 0.9 10/26/2018 12:02 AM     GLUCOSE 107 10/26/2018 12:02 AM     CALCIUM 9.1 10/26/2018 12:02 AM      No results found for: \"CHOL\"  No results found for: \"TRIG\"  No results

## 2025-04-24 ENCOUNTER — TELEPHONE (OUTPATIENT)
Dept: PSYCHIATRY | Age: 60
End: 2025-04-24

## 2025-04-24 NOTE — TELEPHONE ENCOUNTER
Called and confirmed appt with pt for 4/25 @ 8 am     Electronically signed by Jose R Rondon on 4/24/2025 at 11:36 AM

## 2025-04-25 ENCOUNTER — TELEMEDICINE (OUTPATIENT)
Dept: PSYCHIATRY | Age: 60
End: 2025-04-25

## 2025-04-25 ENCOUNTER — TELEPHONE (OUTPATIENT)
Dept: PSYCHIATRY | Age: 60
End: 2025-04-25

## 2025-04-25 DIAGNOSIS — F41.0 GENERALIZED ANXIETY DISORDER WITH PANIC ATTACKS: ICD-10-CM

## 2025-04-25 DIAGNOSIS — F41.1 GENERALIZED ANXIETY DISORDER WITH PANIC ATTACKS: ICD-10-CM

## 2025-04-25 DIAGNOSIS — F60.9 PERSONALITY DISORDER, UNSPECIFIED (HCC): ICD-10-CM

## 2025-04-25 DIAGNOSIS — F39 UNSPECIFIED MOOD (AFFECTIVE) DISORDER: Primary | ICD-10-CM

## 2025-04-25 ASSESSMENT — PATIENT HEALTH QUESTIONNAIRE - PHQ9
7. TROUBLE CONCENTRATING ON THINGS, SUCH AS READING THE NEWSPAPER OR WATCHING TELEVISION: MORE THAN HALF THE DAYS
SUM OF ALL RESPONSES TO PHQ QUESTIONS 1-9: 22
1. LITTLE INTEREST OR PLEASURE IN DOING THINGS: NEARLY EVERY DAY
SUM OF ALL RESPONSES TO PHQ QUESTIONS 1-9: 22
5. POOR APPETITE OR OVEREATING: NEARLY EVERY DAY
4. FEELING TIRED OR HAVING LITTLE ENERGY: MORE THAN HALF THE DAYS
SUM OF ALL RESPONSES TO PHQ QUESTIONS 1-9: 21
9. THOUGHTS THAT YOU WOULD BE BETTER OFF DEAD, OR OF HURTING YOURSELF: SEVERAL DAYS
3. TROUBLE FALLING OR STAYING ASLEEP: NEARLY EVERY DAY
10. IF YOU CHECKED OFF ANY PROBLEMS, HOW DIFFICULT HAVE THESE PROBLEMS MADE IT FOR YOU TO DO YOUR WORK, TAKE CARE OF THINGS AT HOME, OR GET ALONG WITH OTHER PEOPLE: EXTREMELY DIFFICULT
8. MOVING OR SPEAKING SO SLOWLY THAT OTHER PEOPLE COULD HAVE NOTICED. OR THE OPPOSITE, BEING SO FIGETY OR RESTLESS THAT YOU HAVE BEEN MOVING AROUND A LOT MORE THAN USUAL: MORE THAN HALF THE DAYS
6. FEELING BAD ABOUT YOURSELF - OR THAT YOU ARE A FAILURE OR HAVE LET YOURSELF OR YOUR FAMILY DOWN: NEARLY EVERY DAY
SUM OF ALL RESPONSES TO PHQ QUESTIONS 1-9: 22
2. FEELING DOWN, DEPRESSED OR HOPELESS: NEARLY EVERY DAY

## 2025-04-25 ASSESSMENT — ANXIETY QUESTIONNAIRES
6. BECOMING EASILY ANNOYED OR IRRITABLE: NEARLY EVERY DAY
2. NOT BEING ABLE TO STOP OR CONTROL WORRYING: MORE THAN HALF THE DAYS
4. TROUBLE RELAXING: NEARLY EVERY DAY
5. BEING SO RESTLESS THAT IT IS HARD TO SIT STILL: MORE THAN HALF THE DAYS
IF YOU CHECKED OFF ANY PROBLEMS ON THIS QUESTIONNAIRE, HOW DIFFICULT HAVE THESE PROBLEMS MADE IT FOR YOU TO DO YOUR WORK, TAKE CARE OF THINGS AT HOME, OR GET ALONG WITH OTHER PEOPLE: EXTREMELY DIFFICULT
3. WORRYING TOO MUCH ABOUT DIFFERENT THINGS: NEARLY EVERY DAY
7. FEELING AFRAID AS IF SOMETHING AWFUL MIGHT HAPPEN: NEARLY EVERY DAY
GAD7 TOTAL SCORE: 18
1. FEELING NERVOUS, ANXIOUS, OR ON EDGE: MORE THAN HALF THE DAYS

## 2025-04-25 ASSESSMENT — COLUMBIA-SUICIDE SEVERITY RATING SCALE - C-SSRS
2. HAVE YOU ACTUALLY HAD ANY THOUGHTS OF KILLING YOURSELF?: NO
6. HAVE YOU EVER DONE ANYTHING, STARTED TO DO ANYTHING, OR PREPARED TO DO ANYTHING TO END YOUR LIFE?: NO
1. WITHIN THE PAST MONTH, HAVE YOU WISHED YOU WERE DEAD OR WISHED YOU COULD GO TO SLEEP AND NOT WAKE UP?: YES

## 2025-04-25 NOTE — PROGRESS NOTES
Base) MCG/ACT inhaler Inhale 2 puffs into the lungs every 6 hours as needed for Wheezing 1 Inhaler 3     No current facility-administered medications for this visit.       Social History:   Social History     Socioeconomic History    Marital status:      Spouse name: Not on file    Number of children: Not on file    Years of education: Not on file    Highest education level: Not on file   Occupational History    Not on file   Tobacco Use    Smoking status: Former     Current packs/day: 1.00     Types: Cigarettes     Passive exposure: Past    Smokeless tobacco: Never   Vaping Use    Vaping status: Never Used   Substance and Sexual Activity    Alcohol use: No    Drug use: No    Sexual activity: Never     Comment: Pt is a transgender   Other Topics Concern    Not on file   Social History Narrative    Not on file     Social Drivers of Health     Financial Resource Strain: Not on file   Food Insecurity: Not on file   Transportation Needs: Not on file   Physical Activity: Not on file   Stress: Not on file   Social Connections: Unknown (10/11/2023)    Received from [facility], [facility]    Family and Community Support     Help with Day-to-Day Activities: Not on file     Lonely or Isolated: Not on file   Intimate Partner Violence: Unknown (10/5/2024)    Received from [facility]    Abuse Screen     Unsafe at Home or Work/School: Not on file     Feels Threatened by Someone?: Not on file     Does Anyone Keep You from Contacting Others or Doint Things Outside the Home?: Not on file     Physical Sign of Abuse Present: Not on file   Housing Stability: Unknown (10/11/2023)    Received from [facility], [facility]    Housing Stability     Current Living Arrangements: Not on file     Potentially Unsafe Housing Conditions: Not on file       TOBACCO:   reports that he has quit smoking. His smoking use included cigarettes. He has been exposed to tobacco

## 2025-06-02 ENCOUNTER — TELEPHONE (OUTPATIENT)
Dept: NEUROLOGY | Age: 60
End: 2025-06-02

## 2025-06-02 NOTE — TELEPHONE ENCOUNTER
WALT URBINA (Key: [id number])  PA Case ID #: [id number]  Need Help? Call us at [phone]  Outcome  [facility]  PA Case: [id number], Status: Approved, Coverage Starts on: 1/1/2025 12:00:00 AM, Coverage Ends on: 12/31/2025 12:00:00 AM. Questions? Contact [phone].  Effective Date: 1/1/2025  Authorization Expiration Date: 12/31/2025  Drug  Ubrelvy 100MG tablets    Form  [employer] Electronic PA Form

## 2025-06-09 DIAGNOSIS — F41.1 GENERALIZED ANXIETY DISORDER WITH PANIC ATTACKS: ICD-10-CM

## 2025-06-09 DIAGNOSIS — F41.0 GENERALIZED ANXIETY DISORDER WITH PANIC ATTACKS: ICD-10-CM

## 2025-06-09 RX ORDER — DESVENLAFAXINE 25 MG/1
75 TABLET, EXTENDED RELEASE ORAL DAILY
Qty: 90 TABLET | Refills: 3 | Status: SHIPPED | OUTPATIENT
Start: 2025-06-09 | End: 2025-07-09

## 2025-06-09 NOTE — TELEPHONE ENCOUNTER
components found for: \"LDLCHOLESTEROL\", \"LDLCALC\"  No results found for: \"VLDL\"  No results found for: \"CHOLHDLRATIO\"  No results found for: \"LABA1C\"  No results found for: \"EAG\"  No results found for: \"TSHFT4\", \"TSH\"  No results found for: \"VITD25\"        Assessment:    1. Unspecified mood (affective) disorder    2. Generalized anxiety disorder with panic attacks    3. Insomnia, unspecified type    4. Personality disorder, unspecified (HCC)          No evidence of acute suicidality, homicidality or psychosis observed.  Patient is psychiatrically stable.       Plan:  1.  No medication changes.  The risks, benefits, side effects, indications, contraindications, and adverse effects of the medications have been discussed. Yes.  2. The pt has verbalized understanding and has capacity to give informed consent.  3. The [place] report has been reviewed according to HB1 regulations.  4. Supportive therapy offered.  Patient will continue to see our therapist.  5. Follow up:    Return in about 4 months (around 6/27/2025).  6. The patient has been advised to call with any problems.  Instructed to call suicide hotline, 911 or come to the ER if suicidal or symptoms worsen.  7. Controlled substance Treatment Plan:   8. The above listed medications have been continued, modifications in meds and other orders/labs as follows:                 Encounter Medications   No orders of the defined types were placed in this encounter.                  No orders of the defined types were placed in this encounter.        9. Additional comments:            10.Over 50% of the total visit time of   30  minutes was spent on counseling and/or coordination of care of:                         1. Unspecified mood (affective) disorder    2. Generalized anxiety disorder with panic attacks    3. Insomnia, unspecified type    4. Personality disorder, unspecified (HCC)                Montserrat Carbajal MD

## 2025-06-29 DIAGNOSIS — G25.81 RESTLESS LEGS: ICD-10-CM

## 2025-06-30 RX ORDER — ROPINIROLE 1 MG/1
1 TABLET, FILM COATED ORAL 3 TIMES DAILY
Qty: 90 TABLET | Refills: 3 | Status: SHIPPED | OUTPATIENT
Start: 2025-06-30

## 2025-06-30 NOTE — TELEPHONE ENCOUNTER
Requested Prescriptions     Pending Prescriptions Disp Refills    rOPINIRole (REQUIP) 1 MG tablet [Pharmacy Med Name: ropinirole 1 mg tablet] 90 tablet 3     Sig: TAKE ONE TABLET BY MOUTH THREE times daily       Last Office Visit: 2/17/2025  Next Office Visit: 8/18/2025  Last Medication Refill: 12/9/24with 3 rf    Former [facility] patient

## 2025-07-08 ENCOUNTER — TELEPHONE (OUTPATIENT)
Dept: PSYCHIATRY | Age: 60
End: 2025-07-08

## 2025-07-08 NOTE — TELEPHONE ENCOUNTER
Called patient to remind them of their appointment     -Pt confirmed  Reminded patient to complete their visit pre-check/digital registration in [employer].    Electronically signed by Juliet Boyce MA on 7/8/2025 at 4:13 PM

## 2025-07-09 ENCOUNTER — SCHEDULED TELEPHONE ENCOUNTER (OUTPATIENT)
Dept: PSYCHIATRY | Age: 60
End: 2025-07-09
Payer: MEDICARE

## 2025-07-09 DIAGNOSIS — F43.12 CHRONIC POST-TRAUMATIC STRESS DISORDER: ICD-10-CM

## 2025-07-09 DIAGNOSIS — F60.9 PERSONALITY DISORDER, UNSPECIFIED (HCC): ICD-10-CM

## 2025-07-09 DIAGNOSIS — F39 UNSPECIFIED MOOD (AFFECTIVE) DISORDER: Primary | ICD-10-CM

## 2025-07-09 DIAGNOSIS — G47.00 INSOMNIA, UNSPECIFIED TYPE: ICD-10-CM

## 2025-07-09 DIAGNOSIS — F41.1 GENERALIZED ANXIETY DISORDER WITH PANIC ATTACKS: ICD-10-CM

## 2025-07-09 DIAGNOSIS — F41.0 GENERALIZED ANXIETY DISORDER WITH PANIC ATTACKS: ICD-10-CM

## 2025-07-09 PROCEDURE — 99448 NTRPROF PH1/NTRNET/EHR 21-30: CPT | Performed by: PSYCHIATRY & NEUROLOGY

## 2025-07-09 ASSESSMENT — ANXIETY QUESTIONNAIRES
5. BEING SO RESTLESS THAT IT IS HARD TO SIT STILL: SEVERAL DAYS
IF YOU CHECKED OFF ANY PROBLEMS ON THIS QUESTIONNAIRE, HOW DIFFICULT HAVE THESE PROBLEMS MADE IT FOR YOU TO DO YOUR WORK, TAKE CARE OF THINGS AT HOME, OR GET ALONG WITH OTHER PEOPLE: SOMEWHAT DIFFICULT
5. BEING SO RESTLESS THAT IT IS HARD TO SIT STILL: SEVERAL DAYS
2. NOT BEING ABLE TO STOP OR CONTROL WORRYING: SEVERAL DAYS
3. WORRYING TOO MUCH ABOUT DIFFERENT THINGS: SEVERAL DAYS
6. BECOMING EASILY ANNOYED OR IRRITABLE: MORE THAN HALF THE DAYS
1. FEELING NERVOUS, ANXIOUS, OR ON EDGE: SEVERAL DAYS
4. TROUBLE RELAXING: SEVERAL DAYS
4. TROUBLE RELAXING: SEVERAL DAYS
2. NOT BEING ABLE TO STOP OR CONTROL WORRYING: SEVERAL DAYS
GAD7 TOTAL SCORE: 9
3. WORRYING TOO MUCH ABOUT DIFFERENT THINGS: SEVERAL DAYS
1. FEELING NERVOUS, ANXIOUS, OR ON EDGE: SEVERAL DAYS
IF YOU CHECKED OFF ANY PROBLEMS ON THIS QUESTIONNAIRE, HOW DIFFICULT HAVE THESE PROBLEMS MADE IT FOR YOU TO DO YOUR WORK, TAKE CARE OF THINGS AT HOME, OR GET ALONG WITH OTHER PEOPLE: SOMEWHAT DIFFICULT
7. FEELING AFRAID AS IF SOMETHING AWFUL MIGHT HAPPEN: MORE THAN HALF THE DAYS
6. BECOMING EASILY ANNOYED OR IRRITABLE: MORE THAN HALF THE DAYS
7. FEELING AFRAID AS IF SOMETHING AWFUL MIGHT HAPPEN: MORE THAN HALF THE DAYS

## 2025-07-09 ASSESSMENT — PATIENT HEALTH QUESTIONNAIRE - PHQ9
5. POOR APPETITE OR OVEREATING: MORE THAN HALF THE DAYS
SUM OF ALL RESPONSES TO PHQ QUESTIONS 1-9: 15
8. MOVING OR SPEAKING SO SLOWLY THAT OTHER PEOPLE COULD HAVE NOTICED. OR THE OPPOSITE, BEING SO FIGETY OR RESTLESS THAT YOU HAVE BEEN MOVING AROUND A LOT MORE THAN USUAL: SEVERAL DAYS
10. IF YOU CHECKED OFF ANY PROBLEMS, HOW DIFFICULT HAVE THESE PROBLEMS MADE IT FOR YOU TO DO YOUR WORK, TAKE CARE OF THINGS AT HOME, OR GET ALONG WITH OTHER PEOPLE: SOMEWHAT DIFFICULT
SUM OF ALL RESPONSES TO PHQ QUESTIONS 1-9: 16
5. POOR APPETITE OR OVEREATING: MORE THAN HALF THE DAYS
3. TROUBLE FALLING OR STAYING ASLEEP: MORE THAN HALF THE DAYS
1. LITTLE INTEREST OR PLEASURE IN DOING THINGS: MORE THAN HALF THE DAYS
6. FEELING BAD ABOUT YOURSELF - OR THAT YOU ARE A FAILURE OR HAVE LET YOURSELF OR YOUR FAMILY DOWN: MORE THAN HALF THE DAYS
7. TROUBLE CONCENTRATING ON THINGS, SUCH AS READING THE NEWSPAPER OR WATCHING TELEVISION: MORE THAN HALF THE DAYS
SUM OF ALL RESPONSES TO PHQ QUESTIONS 1-9: 16
4. FEELING TIRED OR HAVING LITTLE ENERGY: MORE THAN HALF THE DAYS
8. MOVING OR SPEAKING SO SLOWLY THAT OTHER PEOPLE COULD HAVE NOTICED. OR THE OPPOSITE - BEING SO FIDGETY OR RESTLESS THAT YOU HAVE BEEN MOVING AROUND A LOT MORE THAN USUAL: SEVERAL DAYS
10. IF YOU CHECKED OFF ANY PROBLEMS, HOW DIFFICULT HAVE THESE PROBLEMS MADE IT FOR YOU TO DO YOUR WORK, TAKE CARE OF THINGS AT HOME, OR GET ALONG WITH OTHER PEOPLE: SOMEWHAT DIFFICULT
4. FEELING TIRED OR HAVING LITTLE ENERGY: MORE THAN HALF THE DAYS
SUM OF ALL RESPONSES TO PHQ QUESTIONS 1-9: 16
9. THOUGHTS THAT YOU WOULD BE BETTER OFF DEAD, OR OF HURTING YOURSELF: SEVERAL DAYS
9. THOUGHTS THAT YOU WOULD BE BETTER OFF DEAD, OR OF HURTING YOURSELF: SEVERAL DAYS
6. FEELING BAD ABOUT YOURSELF - OR THAT YOU ARE A FAILURE OR HAVE LET YOURSELF OR YOUR FAMILY DOWN: MORE THAN HALF THE DAYS
3. TROUBLE FALLING OR STAYING ASLEEP: MORE THAN HALF THE DAYS
7. TROUBLE CONCENTRATING ON THINGS, SUCH AS READING THE NEWSPAPER OR WATCHING TELEVISION: MORE THAN HALF THE DAYS
SUM OF ALL RESPONSES TO PHQ QUESTIONS 1-9: 16
2. FEELING DOWN, DEPRESSED OR HOPELESS: MORE THAN HALF THE DAYS
2. FEELING DOWN, DEPRESSED OR HOPELESS: MORE THAN HALF THE DAYS
1. LITTLE INTEREST OR PLEASURE IN DOING THINGS: MORE THAN HALF THE DAYS

## 2025-07-09 ASSESSMENT — COLUMBIA-SUICIDE SEVERITY RATING SCALE - C-SSRS
2. IN THE PAST MONTH, HAVE YOU ACTUALLY HAD ANY THOUGHTS OF KILLING YOURSELF?: NO
6. IN YOUR LIFETIME, HAVE YOU EVER DONE ANYTHING, STARTED TO DO ANYTHING, OR PREPARED TO DO ANYTHING TO END YOUR LIFE?: NO
1. IN THE PAST MONTH, HAVE YOU WISHED YOU WERE DEAD OR WISHED YOU COULD GO TO SLEEP AND NOT WAKE UP?: YES

## 2025-07-09 NOTE — PROGRESS NOTES
continue to see our therapist.  5. Follow up: Return in about 5 months (around 12/9/2025).  6. The patient has been advised to call with any problems.  Instructed to call suicide hotline, 911 or come to the ER if suicidal or symptoms worsen.  7. Controlled substance Treatment Plan:   8. The above listed medications have been continued, modifications in meds and other orders/labs as follows:    No orders of the defined types were placed in this encounter.     No orders of the defined types were placed in this encounter.      9. Additional comments:         10.Over 50% of the total visit time of   21  minutes was spent on counseling and/or coordination of care of:                        1. Unspecified mood (affective) disorder    2. Generalized anxiety disorder with panic attacks    3. Chronic post-traumatic stress disorder    4. Insomnia, unspecified type    5. Personality disorder, unspecified (HCC)              Montserrat Carbajal MD

## 2025-07-29 ENCOUNTER — TELEPHONE (OUTPATIENT)
Dept: PSYCHIATRY | Age: 60
End: 2025-07-29

## 2025-07-29 DIAGNOSIS — G43.109 MIGRAINE WITH VISUAL AURA: ICD-10-CM

## 2025-07-29 RX ORDER — UBROGEPANT 100 MG/1
TABLET ORAL
Qty: 10 TABLET | Refills: 11 | Status: SHIPPED | OUTPATIENT
Start: 2025-07-29

## 2025-07-29 NOTE — TELEPHONE ENCOUNTER
Pt called stating that his medications need to be adjusted. MA asked pt why does he thinks that his meds need to be adjusted. Pt stated that he has been hallucinating for several months and their aren't commanding. Pt that people will sit in his room for hours and it hurts his feelings  cause they won't talkto him or let him look at them. Pt thinks it may be ghosts due to his dog looking towards the direction where people are sitting at. He also thought that a friend and him shop lifted. Pt;'s friend how to tell him that they did pay the groceries.    Above info was given to Dr. Carbajal     Per provider called and let pt know that Dr. Carbajal wants him to make an appt with his eye doctor cause usually visual hallucinations is not cause by mental health. Sometimes its an issue with the retina. Then we will go form there Pt stated ok.      Electronically signed by Jose R Rondon on 7/29/2025 at 9:12 AM

## 2025-07-29 NOTE — TELEPHONE ENCOUNTER
Requested Prescriptions     Pending Prescriptions Disp Refills    UBRELVY 100 MG TABS [Pharmacy Med Name: Ubrelvy 100 mg tablet] 10 tablet 3     Sig: TAKE 1 TABLET BY MOUTH AT ONSET OF MIGRAINE. MAY REPEAT 1 TIME IN 2 HOURS. IF NO IMPROVEMENT. DO NOT EXCEED 2 TABLETS IN 24 HOURS       Last Office Visit: 2/17/2025  Next Office Visit: 8/21/2025  Last Medication Refill:7/9/2025    Was a patient of [facility] will be following you for care now

## 2025-08-01 ENCOUNTER — APPOINTMENT (OUTPATIENT)
Dept: GENERAL RADIOLOGY | Facility: HOSPITAL | Age: 60
End: 2025-08-01
Payer: MEDICARE

## 2025-08-01 ENCOUNTER — APPOINTMENT (OUTPATIENT)
Dept: CT IMAGING | Facility: HOSPITAL | Age: 60
End: 2025-08-01
Payer: MEDICARE

## 2025-08-01 ENCOUNTER — HOSPITAL ENCOUNTER (EMERGENCY)
Facility: HOSPITAL | Age: 60
Discharge: HOME | End: 2025-08-02
Attending: FAMILY MEDICINE
Payer: MEDICARE

## 2025-08-01 DIAGNOSIS — R42 VERTIGO: ICD-10-CM

## 2025-08-01 DIAGNOSIS — G40.909 RECURRENT SEIZURES: Primary | ICD-10-CM

## 2025-08-01 DIAGNOSIS — M19.011 ARTHRITIS OF RIGHT SHOULDER REGION: ICD-10-CM

## 2025-08-01 DIAGNOSIS — M89.8X1 PAIN OF RIGHT CLAVICLE: ICD-10-CM

## 2025-08-01 DIAGNOSIS — I95.1 ORTHOSTATIC HYPOTENSION: ICD-10-CM

## 2025-08-01 DIAGNOSIS — S32.009A CLOSED FRACTURE OF SPINOUS PROCESS OF LUMBAR VERTEBRA, INITIAL ENCOUNTER: ICD-10-CM

## 2025-08-01 DIAGNOSIS — E87.6 HYPOKALEMIA: ICD-10-CM

## 2025-08-01 LAB
ALBUMIN SERPL-MCNC: 3.9 G/DL (ref 3.5–5.2)
ALBUMIN/GLOB SERPL: 1.3 G/DL
ALP SERPL-CCNC: 72 U/L (ref 39–117)
ALT SERPL W P-5'-P-CCNC: 10 U/L (ref 1–41)
ANION GAP SERPL CALCULATED.3IONS-SCNC: 13 MMOL/L (ref 5–15)
AST SERPL-CCNC: 15 U/L (ref 1–40)
BASOPHILS # BLD AUTO: 0.06 10*3/MM3 (ref 0–0.2)
BASOPHILS NFR BLD AUTO: 0.8 % (ref 0–1.5)
BILIRUB SERPL-MCNC: 0.7 MG/DL (ref 0–1.2)
BUN SERPL-MCNC: 15.2 MG/DL (ref 8–23)
BUN/CREAT SERPL: 9 (ref 7–25)
CALCIUM SPEC-SCNC: 8.8 MG/DL (ref 8.6–10.5)
CHLORIDE SERPL-SCNC: 100 MMOL/L (ref 98–107)
CK SERPL-CCNC: 148 U/L (ref 20–200)
CO2 SERPL-SCNC: 25 MMOL/L (ref 22–29)
CREAT SERPL-MCNC: 1.68 MG/DL (ref 0.76–1.27)
DEPRECATED RDW RBC AUTO: 45.9 FL (ref 37–54)
EGFRCR SERPLBLD CKD-EPI 2021: 45.9 ML/MIN/1.73
EOSINOPHIL # BLD AUTO: 0.33 10*3/MM3 (ref 0–0.4)
EOSINOPHIL NFR BLD AUTO: 4.1 % (ref 0.3–6.2)
ERYTHROCYTE [DISTWIDTH] IN BLOOD BY AUTOMATED COUNT: 14 % (ref 12.3–15.4)
GLOBULIN UR ELPH-MCNC: 2.9 GM/DL
GLUCOSE SERPL-MCNC: 136 MG/DL (ref 65–99)
HCT VFR BLD AUTO: 48.4 % (ref 37.5–51)
HGB BLD-MCNC: 15.9 G/DL (ref 13–17.7)
IMM GRANULOCYTES # BLD AUTO: 0.03 10*3/MM3 (ref 0–0.05)
IMM GRANULOCYTES NFR BLD AUTO: 0.4 % (ref 0–0.5)
LYMPHOCYTES # BLD AUTO: 1.65 10*3/MM3 (ref 0.7–3.1)
LYMPHOCYTES NFR BLD AUTO: 20.7 % (ref 19.6–45.3)
MAGNESIUM SERPL-MCNC: 2 MG/DL (ref 1.6–2.4)
MCH RBC QN AUTO: 29.4 PG (ref 26.6–33)
MCHC RBC AUTO-ENTMCNC: 32.9 G/DL (ref 31.5–35.7)
MCV RBC AUTO: 89.6 FL (ref 79–97)
MONOCYTES # BLD AUTO: 0.7 10*3/MM3 (ref 0.1–0.9)
MONOCYTES NFR BLD AUTO: 8.8 % (ref 5–12)
NEUTROPHILS NFR BLD AUTO: 5.21 10*3/MM3 (ref 1.7–7)
NEUTROPHILS NFR BLD AUTO: 65.2 % (ref 42.7–76)
NRBC BLD AUTO-RTO: 0 /100 WBC (ref 0–0.2)
PLATELET # BLD AUTO: 244 10*3/MM3 (ref 140–450)
PMV BLD AUTO: 9.5 FL (ref 6–12)
POTASSIUM SERPL-SCNC: 3.4 MMOL/L (ref 3.5–5.2)
PROT SERPL-MCNC: 6.8 G/DL (ref 6–8.5)
RBC # BLD AUTO: 5.4 10*6/MM3 (ref 4.14–5.8)
SODIUM SERPL-SCNC: 138 MMOL/L (ref 136–145)
TROPONIN T SERPL HS-MCNC: 6 NG/L
WBC NRBC COR # BLD AUTO: 7.98 10*3/MM3 (ref 3.4–10.8)

## 2025-08-01 PROCEDURE — 83735 ASSAY OF MAGNESIUM: CPT | Performed by: FAMILY MEDICINE

## 2025-08-01 PROCEDURE — 25810000003 SODIUM CHLORIDE 0.9 % SOLUTION: Performed by: FAMILY MEDICINE

## 2025-08-01 PROCEDURE — 25810000003 LACTATED RINGERS SOLUTION: Performed by: FAMILY MEDICINE

## 2025-08-01 PROCEDURE — 82550 ASSAY OF CK (CPK): CPT | Performed by: FAMILY MEDICINE

## 2025-08-01 PROCEDURE — 71045 X-RAY EXAM CHEST 1 VIEW: CPT

## 2025-08-01 PROCEDURE — 80175 DRUG SCREEN QUAN LAMOTRIGINE: CPT | Performed by: FAMILY MEDICINE

## 2025-08-01 PROCEDURE — 85025 COMPLETE CBC W/AUTO DIFF WBC: CPT | Performed by: FAMILY MEDICINE

## 2025-08-01 PROCEDURE — 96361 HYDRATE IV INFUSION ADD-ON: CPT

## 2025-08-01 PROCEDURE — 80053 COMPREHEN METABOLIC PANEL: CPT | Performed by: FAMILY MEDICINE

## 2025-08-01 PROCEDURE — 70450 CT HEAD/BRAIN W/O DYE: CPT

## 2025-08-01 PROCEDURE — 72125 CT NECK SPINE W/O DYE: CPT

## 2025-08-01 PROCEDURE — 99284 EMERGENCY DEPT VISIT MOD MDM: CPT | Performed by: FAMILY MEDICINE

## 2025-08-01 PROCEDURE — 84484 ASSAY OF TROPONIN QUANT: CPT | Performed by: FAMILY MEDICINE

## 2025-08-01 PROCEDURE — 73000 X-RAY EXAM OF COLLAR BONE: CPT

## 2025-08-01 PROCEDURE — 72131 CT LUMBAR SPINE W/O DYE: CPT

## 2025-08-01 RX ORDER — ROPINIROLE 1 MG/1
1 TABLET, FILM COATED ORAL ONCE
Status: COMPLETED | OUTPATIENT
Start: 2025-08-01 | End: 2025-08-01

## 2025-08-01 RX ORDER — POTASSIUM CHLORIDE 1500 MG/1
40 TABLET, EXTENDED RELEASE ORAL ONCE
Status: COMPLETED | OUTPATIENT
Start: 2025-08-01 | End: 2025-08-02

## 2025-08-01 RX ORDER — MECLIZINE HYDROCHLORIDE 25 MG/1
25 TABLET ORAL ONCE
Status: COMPLETED | OUTPATIENT
Start: 2025-08-01 | End: 2025-08-02

## 2025-08-01 RX ORDER — SODIUM CHLORIDE 0.9 % (FLUSH) 0.9 %
10 SYRINGE (ML) INJECTION AS NEEDED
Status: DISCONTINUED | OUTPATIENT
Start: 2025-08-01 | End: 2025-08-02 | Stop reason: HOSPADM

## 2025-08-01 RX ORDER — LAMOTRIGINE 100 MG/1
100 TABLET ORAL DAILY
Status: DISCONTINUED | OUTPATIENT
Start: 2025-08-02 | End: 2025-08-02 | Stop reason: HOSPADM

## 2025-08-01 RX ADMIN — ROPINIROLE 1 MG: 1 TABLET, FILM COATED ORAL at 22:04

## 2025-08-01 RX ADMIN — SODIUM CHLORIDE, POTASSIUM CHLORIDE, SODIUM LACTATE AND CALCIUM CHLORIDE 1000 ML: 600; 310; 30; 20 INJECTION, SOLUTION INTRAVENOUS at 23:28

## 2025-08-01 RX ADMIN — SODIUM CHLORIDE 1000 ML: 9 INJECTION, SOLUTION INTRAVENOUS at 22:04

## 2025-08-02 VITALS
SYSTOLIC BLOOD PRESSURE: 128 MMHG | DIASTOLIC BLOOD PRESSURE: 86 MMHG | BODY MASS INDEX: 27.47 KG/M2 | HEART RATE: 93 BPM | OXYGEN SATURATION: 96 % | WEIGHT: 175 LBS | HEIGHT: 67 IN | RESPIRATION RATE: 16 BRPM | TEMPERATURE: 98.4 F

## 2025-08-02 LAB
GEN 5 1HR TROPONIN T REFLEX: <6 NG/L
TROPONIN T NUMERIC DELTA: NORMAL

## 2025-08-02 PROCEDURE — 36415 COLL VENOUS BLD VENIPUNCTURE: CPT

## 2025-08-02 PROCEDURE — 96374 THER/PROPH/DIAG INJ IV PUSH: CPT

## 2025-08-02 PROCEDURE — 25010000002 ONDANSETRON PER 1 MG: Performed by: FAMILY MEDICINE

## 2025-08-02 PROCEDURE — 84484 ASSAY OF TROPONIN QUANT: CPT | Performed by: FAMILY MEDICINE

## 2025-08-02 RX ORDER — MECLIZINE HYDROCHLORIDE 25 MG/1
25 TABLET ORAL AS NEEDED
Qty: 15 TABLET | Refills: 0 | Status: SHIPPED | OUTPATIENT
Start: 2025-08-02 | End: 2025-08-07

## 2025-08-02 RX ORDER — ONDANSETRON 2 MG/ML
4 INJECTION INTRAMUSCULAR; INTRAVENOUS ONCE
Status: COMPLETED | OUTPATIENT
Start: 2025-08-02 | End: 2025-08-02

## 2025-08-02 RX ADMIN — ONDANSETRON 4 MG: 2 INJECTION INTRAMUSCULAR; INTRAVENOUS at 00:44

## 2025-08-02 RX ADMIN — MECLIZINE HYDROCLORIDE 25 MG: 25 TABLET ORAL at 00:58

## 2025-08-02 RX ADMIN — POTASSIUM CHLORIDE 40 MEQ: 1500 TABLET, EXTENDED RELEASE ORAL at 01:00

## 2025-08-02 NOTE — ED NOTES
THE FOLLOWING SEIZURE PRECAUTIONS WERE INITIATED:             [* ]   MAINTAIN PATIENT'S SAFETY                       [ ]   PLACE ON CARDIAC, BP, AND PULSE OXIMETRY MONITOR                [ ] MAINTAIN 02 SAT> 94%             [ ] YANKAUER SUCTION AT BEDSIDE             [ *] BED RAILS ELEVATED, SEIZURE PADS IN PLACE             [ ] CALL LIGHT IN REACH             [ *] KEEP BED IN LOW POSTION             [ ] DECREASE STIMULATION: DIM LIGHT, TURN OF TV OFF             [ ] EDUCATE FAMILY ON SEIZURE ACTIVITY AND NOTIFY STAFF WITH NEW ACTIVITY    Patient in hallway at this time.

## 2025-08-02 NOTE — DISCHARGE INSTRUCTIONS
Call to schedule an appointment for your back if you continue to have back pain from your spinous process fracture.

## 2025-08-02 NOTE — ED PROVIDER NOTES
"HPI:    Patient is a 60-year-old white male with known history of seizure disorders on Lamictal 100 mg twice daily also takes Requip 1 mg 3 times daily and Ubrelvy for migraines presents status post seizure and fall while outside.  He states he hit the back of his head and his pain in his head, back of his neck, right clavicle, and low back.  He states he does not know how long he was out.  He states that this seizure was a little different than his typical \"drop seizures\".  Big difference is usually he can feel the sensation, and unable to get to area to lay down before he \"passes out\".  This time he fell outside and then awoke smelling grass and was went away smelling grass and noted he was outside.  Patient states he has got an abrasion on the back of his head as well as in his low back.  No loss of bowel or bladder control.    REVIEW OF SYSTEMS  CONSTITUTIONAL:  No complaints of fever, chills,or weakness  EYES:  No complaints of discharge   ENT: No complaints of sore throat or ear pain  CARDIOVASCULAR:  No complaints of chest pain, palpitations, or swelling  RESPIRATORY:  No complaints of cough or shortness of breath  GI:  No complaints of abdominal pain, nausea, vomiting, or diarrhea  MUSCULOSKELETAL: Positive for fall with head, neck, low back and right clavicle pain   SKIN: Positive for posterior head and low back abrasion from fall   NEUROLOGIC: Positive for seizure and headache after fall ENDOCRINE:  No complaints of polyuria or polydipsia  LYMPHATIC:  No complaints of swollen glands  GENITOURINARY: No complaints of urinary frequency or hematuria        PAST MEDICAL HISTORY  Past Medical History:   Diagnosis Date    ADD (attention deficit disorder) without hyperactivity     Anxiety     Asthma     COPD (chronic obstructive pulmonary disease)     Depression     Facet syndrome     GERD (gastroesophageal reflux disease)     Hiatal hernia     Hyperlipidemia     Hypertension     Sleep apnea     Stroke     stroke " in right eye per pt.    Tourette's syndrome        FAMILY HISTORY  Family History   Problem Relation Age of Onset    Diabetes Mother     Heart disease Mother     Heart attack Mother     No Known Problems Father     Colon cancer Neg Hx     Colon polyps Neg Hx     Esophageal cancer Neg Hx     Liver cancer Neg Hx     Liver disease Neg Hx     Rectal cancer Neg Hx     Stomach cancer Neg Hx        SOCIAL HISTORY  Social History     Socioeconomic History    Marital status: Single   Tobacco Use    Smoking status: Former     Types: Cigarettes    Smokeless tobacco: Never   Vaping Use    Vaping status: Former   Substance and Sexual Activity    Alcohol use: Not Currently    Drug use: Never    Sexual activity: Defer       IMMUNIZATION HISTORY  Deferred to primary care physician.    SURGICAL HISTORY  Past Surgical History:   Procedure Laterality Date    COLONOSCOPY N/A 9/22/2023    Procedure: COLONOSCOPY WITH ANESTHESIA;  Surgeon: Isha Torre MD;  Location: [facility] ENDOSCOPY;  Service: Gastroenterology;  Laterality: N/A;  pre screen  post normal  Dr. Leonard    ENDOSCOPY N/A 9/22/2023    Procedure: ESOPHAGOGASTRODUODENOSCOPY WITH ANESTHESIA;  Surgeon: Isha Torre MD;  Location: [facility] ENDOSCOPY;  Service: Gastroenterology;  Laterality: N/A;  pre gerd  post      KNEE ARTHROSCOPY Left     MASTECTOMY RADICAL Bilateral     NASAL SINUS SURGERY      Burned excess tissue from nasal passages    SHOULDER SURGERY Right     TONSILLECTOMY AND ADENOIDECTOMY         CURRENT MEDICATIONS    Current Facility-Administered Medications:     lamoTRIgine (LaMICtal) tablet 100 mg, 100 mg, Oral, Daily, Rashard Juárez Jr., MD    [COMPLETED] Insert Peripheral IV, , , Once **AND** sodium chloride 0.9 % flush 10 mL, 10 mL, Intravenous, PRN, Rashard Juárez Jr., MD    Current Outpatient Medications:     meclizine (ANTIVERT) 25 MG tablet, Take 1 tablet by mouth As Needed for Nausea or Dizziness for up to 5 days., Disp: 15 tablet,  Rfl: 0    Advair Diskus 250-50 MCG/DOSE DISKUS, Inhale 1 puff 2 (Two) Times a Day., Disp: , Rfl:     albuterol (PROVENTIL) (2.5 MG/3ML) 0.083% nebulizer solution, Take  by nebulization As Needed., Disp: , Rfl:     amLODIPine (NORVASC) 5 MG tablet, Take 1 tablet by mouth Daily., Disp: , Rfl:     busPIRone (BUSPAR) 10 MG tablet, Take 1 tablet by mouth 3 times a day., Disp: , Rfl:     cetirizine (zyrTEC) 10 MG tablet, Take 1 tablet by mouth As Needed., Disp: , Rfl:     Desvenlafaxine Succinate ER 25 MG tablet sustained-release 24 hour, Take 1 tablet by mouth Daily., Disp: , Rfl:     famotidine (PEPCID) 40 MG tablet, Take 1 tablet by mouth 2 (Two) Times a Day., Disp: , Rfl:     indapamide (LOZOL) 2.5 MG tablet, Take 1 tablet by mouth., Disp: , Rfl:     lamoTRIgine (LaMICtal) 25 MG tablet, USE 100mg titration PER sheet provide in office, Disp: , Rfl:     metoprolol succinate XL (TOPROL-XL) 50 MG 24 hr tablet, Take 1 tablet by mouth every night at bedtime., Disp: 30 tablet, Rfl: 11    naproxen (NAPROSYN) 500 MG tablet, Take 1 tablet by mouth As Needed. (Patient not taking: Reported on 7/30/2024), Disp: , Rfl:     omeprazole (priLOSEC) 40 MG capsule, Take 1 capsule by mouth Every 12 (Twelve) Hours., Disp: , Rfl:     pantoprazole (PROTONIX) 40 MG EC tablet, Take 1 tablet by mouth 2 (Two) Times a Day. (Patient not taking: Reported on 7/30/2024), Disp: , Rfl:     prazosin (MINIPRESS) 1 MG capsule, 1 capsule Every Night. (Patient not taking: Reported on 7/30/2024), Disp: , Rfl:     ProAir  (90 Base) MCG/ACT inhaler, Inhale 2 puffs As Needed., Disp: , Rfl:     risperiDONE (risperDAL) 0.5 MG tablet, Take 2 tablets by mouth 2 (Two) Times a Day. (Patient not taking: Reported on 7/30/2024), Disp: , Rfl:     rOPINIRole (REQUIP) 0.5 MG tablet, Take 1 tablet by mouth 3 times a day., Disp: , Rfl:     rosuvastatin (CRESTOR) 10 MG tablet, Take 1 tablet by mouth Daily., Disp: , Rfl:     sucralfate (CARAFATE) 1 GM/10ML suspension,  "Take 10 mLs by MOUTH THREE times daily. MAY substitute with tablets FOR insurance purposes, Disp: , Rfl:     tamsulosin (FLOMAX) 0.4 MG capsule 24 hr capsule, Take 1 at night (Patient not taking: Reported on 7/30/2024), Disp: , Rfl:     Testosterone Cypionate (DEPOTESTOTERONE CYPIONATE) 200 MG/ML injection, Inject 1 mL into the appropriate muscle as directed by prescriber 1 (One) Time Per Week., Disp: , Rfl:     tiZANidine (ZANAFLEX) 4 MG tablet, Take 1 tablet by mouth Every Night., Disp: , Rfl:     Ubrelvy 100 MG tablet, Take 1 tablet by mouth As Needed., Disp: , Rfl:     vitamin D (ERGOCALCIFEROL) 1.25 MG (34528 UT) capsule capsule, , Disp: , Rfl:     ALLERGIES  Allergies   Allergen Reactions    Cyclobenzaprine Hives    Codeine Other (See Comments)     Unknown     Cortisporin [Bacitra-Neomycin-Polymyxin-Hc] Other (See Comments)     Unknown     Gabapentin Other (See Comments)     Unknown     Lithium Other (See Comments)     Unknown     Lyrica [Pregabalin] Other (See Comments)     Unknown     Paxil [Paroxetine] Other (See Comments)     Unknown     Soma [Carisoprodol] Other (See Comments)     Unknown     Tetracyclines & Related Other (See Comments)     Unknown     Methocarbamol Other (See Comments)     nightmares       Neuro exam    VITAL SIGNS:   /75 (BP Location: Right arm, Patient Position: Lying)   Pulse 71   Temp 97.6 °F (36.4 °C) (Oral)   Resp 18   Ht 170.2 cm (67\")   Wt 79.4 kg (175 lb)   SpO2 98%   BMI 27.41 kg/m²     Constitutional: Patient is alert and in no distress.  Patient with moderate head, neck, right clavicle, and low back discomfort.    Head: There is an occipital abrasion and contusion noted.  No obvious skull depression is appreciated.  No large lacerations or active scalp bleeding.    Eyes: EOMI and PERRL intact.  No nystagmus.      ENT: There is a normal pharynx with no acute erythema or exudate and oral mucosa is moist.  Nose is clear with no drainage.  Tympanic membranes intact " and non-erythemic.    Cardiovascular: S1-S2 regular rate and rhythm.  No murmurs, rubs or gallops are noted.    Respiratory: Patient is clear to auscultation bilaterally with no wheezing or rhonchi.  Chest wall is nontender.  There are no external lesions on the chest.  There is no crepitance.    Abdomen: Soft, nontender.  Bowel sounds are normal in all 4 quadrants. There is no rebound or guarding noted.  There is no abdominal distention or hepatosplenomegaly.    Genitourinary: Patient is voiding appropriately.    Integument: No acute lesions noted.  Color appears to be normal.    Musculoskeletal: There is tender palpation of the posterior scalp with a contusion no active laceration.      Right clavicle: Tender palpation over the right clavicle no gross deformities appreciated.    There is tender palpation of the cervical spine with no obvious step-off or paracervical spasms noted.      There is no tenderness to palpation of the thoracic spine with no step-off.      There is mild tenderness to palpation of the lower lumbar spine with an abrasion noted midline across the lumbar spine.  But no obvious step-off is noted.    Neurological: CN's 2-12 grossly intact no focal deficit strength 5+ in bilateral upper and lower extremity face and trunk.    Eliana Coma Scale: 15    RADIOLOGY/PROCEDURES    TLSO brace placed by tech in the emergency room.  Patient tolerated well.      XR Clavicle Right   Final Result   1. Arthritic changes of the shoulder. No acute fracture.       This report was signed and finalized on 8/1/2025 10:23 PM by Dr. Leighton Andrews MD.          XR Chest 1 View   Final Result   Impression: No evidence of acute cardiopulmonary disease.       This report was signed and finalized on 8/1/2025 10:23 PM by Dr. Leighton Andrews MD.          CT Head Without Contrast    (Results Pending)   CT Cervical Spine Without Contrast    (Results Pending)   CT Lumbar Spine Without Contrast    (Results Pending)      "      FUTURE APPOINTMENTS     No future appointments.       COURSE & MEDICAL DECISION MAKING     Patient's partial differential diagnosis can include:    Seizure with fall injury, cervical sprain, cervical fracture, head contusion, head laceration, skull fracture, intracranial TBI, clavicle contusion, clavicle fracture, lumbar sprain, lumbar contusion, lumbar fracture, back abrasion, electrolyte abnormality, and others    CBC, CMP, CK, magnesium, troponin, Lamictal level, clavicle x-ray, chest x-ray, lumbar CT, CT head, cervical CT.    Will give a dose of Lamictal 100 mg p.o. x 1 and his blood pressure now 1 mg p.o. x 1 with a bolus of normal saline 1 L.    Patient with some dizziness will give meclizine for his dizziness.    Patient has completed his fluid bolus and is getting Zofran for his nausea and now feels comfortable going home.  No other episodes or \"seizure-like activity\".        Patient's level of risk: Moderate        CRITICAL CARE    CRITICAL CARE: No    CRITICAL CARE TIME: None      Also Old charts were reviewed per [employer] EMR.  Pertinent details are summarized above.  All laboratory, radiologic, and EKG studies that were performed in the Emergency Department were a necessary part of the evaluation needed to exclude unstable or  emergent medical conditions:     Patient was hemodynamically and neurologically stable in the ED.   Pertinent studies were reviewed as above.     Recent Results (from the past 24 hours)   Comprehensive Metabolic Panel    Collection Time: 08/01/25 10:03 PM    Specimen: Blood   Result Value Ref Range    Glucose 136 (H) 65 - 99 mg/dL    BUN 15.2 8.0 - 23.0 mg/dL    Creatinine 1.68 (H) 0.76 - 1.27 mg/dL    Sodium 138 136 - 145 mmol/L    Potassium 3.4 (L) 3.5 - 5.2 mmol/L    Chloride 100 98 - 107 mmol/L    CO2 25.0 22.0 - 29.0 mmol/L    Calcium 8.8 8.6 - 10.5 mg/dL    Total Protein 6.8 6.0 - 8.5 g/dL    Albumin 3.9 3.5 - 5.2 g/dL    ALT (SGPT) 10 1 - 41 U/L    AST (SGOT) 15 1 - 40 U/L "    Alkaline Phosphatase 72 39 - 117 U/L    Total Bilirubin 0.7 0.0 - 1.2 mg/dL    Globulin 2.9 gm/dL    A/G Ratio 1.3 g/dL    BUN/Creatinine Ratio 9.0 7.0 - 25.0    Anion Gap 13.0 5.0 - 15.0 mmol/L    eGFR 45.9 (L) >60.0 mL/min/1.73   CK    Collection Time: 08/01/25 10:03 PM    Specimen: Blood   Result Value Ref Range    Creatine Kinase 148 20 - 200 U/L   Magnesium    Collection Time: 08/01/25 10:03 PM    Specimen: Blood   Result Value Ref Range    Magnesium 2.0 1.6 - 2.4 mg/dL   High Sensitivity Troponin T    Collection Time: 08/01/25 10:03 PM    Specimen: Blood   Result Value Ref Range    HS Troponin T 6 <22 ng/L   CBC Auto Differential    Collection Time: 08/01/25 10:03 PM    Specimen: Blood   Result Value Ref Range    WBC 7.98 3.40 - 10.80 10*3/mm3    RBC 5.40 4.14 - 5.80 10*6/mm3    Hemoglobin 15.9 13.0 - 17.7 g/dL    Hematocrit 48.4 37.5 - 51.0 %    MCV 89.6 79.0 - 97.0 fL    MCH 29.4 26.6 - 33.0 pg    MCHC 32.9 31.5 - 35.7 g/dL    RDW 14.0 12.3 - 15.4 %    RDW-SD 45.9 37.0 - 54.0 fl    MPV 9.5 6.0 - 12.0 fL    Platelets 244 140 - 450 10*3/mm3    Neutrophil % 65.2 42.7 - 76.0 %    Lymphocyte % 20.7 19.6 - 45.3 %    Monocyte % 8.8 5.0 - 12.0 %    Eosinophil % 4.1 0.3 - 6.2 %    Basophil % 0.8 0.0 - 1.5 %    Immature Grans % 0.4 0.0 - 0.5 %    Neutrophils, Absolute 5.21 1.70 - 7.00 10*3/mm3    Lymphocytes, Absolute 1.65 0.70 - 3.10 10*3/mm3    Monocytes, Absolute 0.70 0.10 - 0.90 10*3/mm3    Eosinophils, Absolute 0.33 0.00 - 0.40 10*3/mm3    Basophils, Absolute 0.06 0.00 - 0.20 10*3/mm3    Immature Grans, Absolute 0.03 0.00 - 0.05 10*3/mm3    nRBC 0.0 0.0 - 0.2 /100 WBC   High Sensitivity Troponin T 1Hr    Collection Time: 08/02/25 12:19 AM    Specimen: Blood   Result Value Ref Range    HS Troponin T <6 <22 ng/L    Troponin T Numeric Delta           The patient received:  Medications   sodium chloride 0.9 % flush 10 mL (has no administration in time range)   lamoTRIgine (LaMICtal) tablet 100 mg (has no  administration in time range)   sodium chloride 0.9 % bolus 1,000 mL (0 mL Intravenous Stopped 8/1/25 2325)   rOPINIRole (REQUIP) tablet 1 mg (1 mg Oral Given 8/1/25 2204)   potassium chloride (KLOR-CON M20) CR tablet 40 mEq (40 mEq Oral Given 8/2/25 0100)   meclizine (ANTIVERT) tablet 25 mg (25 mg Oral Given 8/2/25 0058)   lactated ringers bolus 1,000 mL (0 mL Intravenous Stopped 8/2/25 0043)   ondansetron (ZOFRAN) injection 4 mg (4 mg Intravenous Given 8/2/25 0044)              ED Disposition       ED Disposition   Discharge    Condition   Stable    Comment   --                   Dragon disclaimer:  Part of this note may be an electronic transcription/translation of spoken language to printed text using the Dragon Dictation System.     This information is consistent with my knowledge of the patient’s controlled substance use history.      FINAL IMPRESSION   Diagnosis Plan   1. Recurrent seizures        2. Hypokalemia        3. Arthritis of right shoulder region        4. Pain of right clavicle        5. Closed fracture of spinous process of lumbar vertebra, initial encounter  [facility] 14. TLSO ()    L2      6. Orthostatic hypotension        7. Vertigo              MD Franck Villavicencio Jr, Thomas Mark Jr., MD  08/02/25 8268

## 2025-08-03 ENCOUNTER — HOSPITAL ENCOUNTER (EMERGENCY)
Facility: HOSPITAL | Age: 60
Discharge: HOME | End: 2025-08-03
Attending: FAMILY MEDICINE | Admitting: FAMILY MEDICINE
Payer: MEDICARE

## 2025-08-03 ENCOUNTER — APPOINTMENT (OUTPATIENT)
Dept: GENERAL RADIOLOGY | Facility: HOSPITAL | Age: 60
End: 2025-08-03
Payer: MEDICARE

## 2025-08-03 VITALS
HEIGHT: 67 IN | WEIGHT: 178.57 LBS | TEMPERATURE: 98.7 F | BODY MASS INDEX: 28.03 KG/M2 | DIASTOLIC BLOOD PRESSURE: 80 MMHG | RESPIRATION RATE: 18 BRPM | HEART RATE: 83 BPM | OXYGEN SATURATION: 92 % | SYSTOLIC BLOOD PRESSURE: 133 MMHG

## 2025-08-03 DIAGNOSIS — S83.92XA SPRAIN OF LEFT KNEE, UNSPECIFIED LIGAMENT, INITIAL ENCOUNTER: ICD-10-CM

## 2025-08-03 DIAGNOSIS — S82.891A CLOSED FRACTURE OF RIGHT ANKLE, INITIAL ENCOUNTER: Primary | ICD-10-CM

## 2025-08-03 DIAGNOSIS — S93.601A SPRAIN OF RIGHT FOOT, INITIAL ENCOUNTER: ICD-10-CM

## 2025-08-03 PROCEDURE — 73562 X-RAY EXAM OF KNEE 3: CPT

## 2025-08-03 PROCEDURE — 73630 X-RAY EXAM OF FOOT: CPT

## 2025-08-03 PROCEDURE — 73610 X-RAY EXAM OF ANKLE: CPT

## 2025-08-03 PROCEDURE — 99283 EMERGENCY DEPT VISIT LOW MDM: CPT | Performed by: FAMILY MEDICINE

## 2025-08-04 ENCOUNTER — TELEPHONE (OUTPATIENT)
Age: 60
End: 2025-08-04

## 2025-08-05 LAB — LAMOTRIGINE SERPL-MCNC: 4.3 UG/ML (ref 2–20)

## 2025-08-12 ENCOUNTER — OFFICE VISIT (OUTPATIENT)
Age: 60
End: 2025-08-12
Payer: MEDICARE

## 2025-08-12 VITALS — WEIGHT: 175 LBS | BODY MASS INDEX: 27.47 KG/M2 | HEIGHT: 67 IN

## 2025-08-12 DIAGNOSIS — S82.839A AVULSION FRACTURE OF DISTAL FIBULA: Primary | ICD-10-CM

## 2025-08-12 DIAGNOSIS — S99.911A INJURY OF RIGHT ANKLE, INITIAL ENCOUNTER: ICD-10-CM
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
## 2025-08-12 DIAGNOSIS — M25.562 ACUTE PAIN OF LEFT KNEE: ICD-10-CM

## 2025-08-12 PROCEDURE — G8427 DOCREV CUR MEDS BY ELIG CLIN: HCPCS

## 2025-08-12 PROCEDURE — 3017F COLORECTAL CA SCREEN DOC REV: CPT

## 2025-08-12 PROCEDURE — G8419 CALC BMI OUT NRM PARAM NOF/U: HCPCS

## 2025-08-12 PROCEDURE — 99203 OFFICE O/P NEW LOW 30 MIN: CPT

## 2025-08-12 PROCEDURE — 1036F TOBACCO NON-USER: CPT

## 2025-08-12 RX ORDER — POTASSIUM CHLORIDE 1500 MG/1
TABLET, EXTENDED RELEASE ORAL
COMMUNITY
Start: 2025-07-14

## 2025-08-12 RX ORDER — BUSPIRONE HYDROCHLORIDE 10 MG/1
10 TABLET ORAL 3 TIMES DAILY
COMMUNITY
Start: 2025-07-09

## 2025-08-12 RX ORDER — TAMOXIFEN CITRATE 10 MG/1
10 TABLET ORAL DAILY
COMMUNITY
Start: 2025-07-14

## 2025-08-12 RX ORDER — HYDROXYZINE PAMOATE 25 MG/1
CAPSULE ORAL
COMMUNITY
Start: 2025-06-09

## 2025-08-12 RX ORDER — TRAMADOL HYDROCHLORIDE 50 MG/1
TABLET ORAL
COMMUNITY
Start: 2025-07-24

## 2025-08-12 RX ORDER — PANTOPRAZOLE SODIUM 40 MG/1
40 TABLET, DELAYED RELEASE ORAL 2 TIMES DAILY
COMMUNITY
Start: 2025-06-09

## (undated) DEVICE — SINGLE PORT MANIFOLD: Brand: NEPTUNE 2

## (undated) DEVICE — ADAPTER CLEANING PORPOISE CLEANING

## (undated) DEVICE — BITE BLOCK ENDOSCP AD 60 FR W/ ADJ STRP PLAS GRN BLOX

## (undated) DEVICE — Device: Brand: DEFENDO AIR/WATER/SUCTION AND BIOPSY VALVE

## (undated) DEVICE — COLON KIT WITH 1.1 OZ ORCA HYDRA SEAL 2 GOWN

## (undated) DEVICE — FORCEPS BX 240CM 2.4MM L NDL RAD JAW 4 M00513334

## (undated) DEVICE — MASK,OXYGEN,MED CONC,ADLT,7' TUB, UC: Brand: PENDING

## (undated) DEVICE — SENSR O2 OXIMAX FNGR A/ 18IN NONSTR

## (undated) DEVICE — CUFF,BP,DISP,1 TUBE,ADULT,HP: Brand: MEDLINE

## (undated) DEVICE — THE CHANNEL CLEANING BRUSH IS A NYLON FLEXI BRUSH ATTACHED TO A FLEXIBLE PLASTIC SHEATH DESIGNED TO SAFELY REMOVE DEBRIS FROM FLEXIBLE ENDOSCOPES.

## (undated) DEVICE — YANKAUER,BULB TIP WITH VENT: Brand: ARGYLE

## (undated) DEVICE — CLEANING SPONGE: Brand: KOALA™

## (undated) DEVICE — CANNULA NSL AD L7FT DIV O2 CO2 W/ M LUERLOCK TRMPT CONN

## (undated) DEVICE — ESOPHAGEAL BALLOON DILATATION CATHETER: Brand: CRE FIXED WIRE

## (undated) DEVICE — BRUSH ENDOSCP 2 END CHN HEDGEHOG

## (undated) DEVICE — CONMED SCOPE SAVER BITE BLOCK, 20X27 MM: Brand: SCOPE SAVER